# Patient Record
Sex: MALE | Race: WHITE | NOT HISPANIC OR LATINO | Employment: OTHER | RURAL
[De-identification: names, ages, dates, MRNs, and addresses within clinical notes are randomized per-mention and may not be internally consistent; named-entity substitution may affect disease eponyms.]

---

## 2017-01-05 ENCOUNTER — HISTORICAL (OUTPATIENT)
Dept: ADMINISTRATIVE | Facility: HOSPITAL | Age: 62
End: 2017-01-05

## 2017-01-10 LAB
LAB AP CLINICAL INFORMATION: NORMAL
LAB AP COMMENTS: NORMAL
LAB AP DIAGNOSIS - HISTORICAL: NORMAL
LAB AP GROSS PATHOLOGY - HISTORICAL: NORMAL
LAB AP SPECIMEN SUBMITTED - HISTORICAL: NORMAL

## 2017-08-22 ENCOUNTER — HISTORICAL (OUTPATIENT)
Dept: ADMINISTRATIVE | Facility: HOSPITAL | Age: 62
End: 2017-08-22

## 2017-08-24 LAB
LAB AP CLINICAL INFORMATION: NORMAL
LAB AP DIAGNOSIS - HISTORICAL: NORMAL
LAB AP GROSS PATHOLOGY - HISTORICAL: NORMAL
LAB AP SPECIMEN SUBMITTED - HISTORICAL: NORMAL

## 2017-09-26 ENCOUNTER — HISTORICAL (OUTPATIENT)
Dept: ADMINISTRATIVE | Facility: HOSPITAL | Age: 62
End: 2017-09-26

## 2017-11-28 ENCOUNTER — HISTORICAL (OUTPATIENT)
Dept: ADMINISTRATIVE | Facility: HOSPITAL | Age: 62
End: 2017-11-28

## 2018-06-20 ENCOUNTER — HISTORICAL (OUTPATIENT)
Dept: ADMINISTRATIVE | Facility: HOSPITAL | Age: 63
End: 2018-06-20

## 2018-07-23 ENCOUNTER — HISTORICAL (OUTPATIENT)
Dept: ADMINISTRATIVE | Facility: HOSPITAL | Age: 63
End: 2018-07-23

## 2018-09-19 ENCOUNTER — HISTORICAL (OUTPATIENT)
Dept: ADMINISTRATIVE | Facility: HOSPITAL | Age: 63
End: 2018-09-19

## 2018-11-14 ENCOUNTER — HISTORICAL (OUTPATIENT)
Dept: ADMINISTRATIVE | Facility: HOSPITAL | Age: 63
End: 2018-11-14

## 2018-11-20 ENCOUNTER — HISTORICAL (OUTPATIENT)
Dept: ADMINISTRATIVE | Facility: HOSPITAL | Age: 63
End: 2018-11-20

## 2019-01-09 ENCOUNTER — HISTORICAL (OUTPATIENT)
Dept: ADMINISTRATIVE | Facility: HOSPITAL | Age: 64
End: 2019-01-09

## 2019-04-10 ENCOUNTER — HISTORICAL (OUTPATIENT)
Dept: ADMINISTRATIVE | Facility: HOSPITAL | Age: 64
End: 2019-04-10

## 2019-07-10 ENCOUNTER — HISTORICAL (OUTPATIENT)
Dept: ADMINISTRATIVE | Facility: HOSPITAL | Age: 64
End: 2019-07-10

## 2019-07-12 ENCOUNTER — HISTORICAL (OUTPATIENT)
Dept: ADMINISTRATIVE | Facility: HOSPITAL | Age: 64
End: 2019-07-12

## 2019-07-25 ENCOUNTER — HISTORICAL (OUTPATIENT)
Dept: ADMINISTRATIVE | Facility: HOSPITAL | Age: 64
End: 2019-07-25

## 2019-08-08 ENCOUNTER — HISTORICAL (OUTPATIENT)
Dept: ADMINISTRATIVE | Facility: HOSPITAL | Age: 64
End: 2019-08-08

## 2019-10-02 ENCOUNTER — HISTORICAL (OUTPATIENT)
Dept: ADMINISTRATIVE | Facility: HOSPITAL | Age: 64
End: 2019-10-02

## 2019-11-11 ENCOUNTER — HISTORICAL (OUTPATIENT)
Dept: ADMINISTRATIVE | Facility: HOSPITAL | Age: 64
End: 2019-11-11

## 2020-01-08 ENCOUNTER — HISTORICAL (OUTPATIENT)
Dept: ADMINISTRATIVE | Facility: HOSPITAL | Age: 65
End: 2020-01-08

## 2020-05-26 ENCOUNTER — HISTORICAL (OUTPATIENT)
Dept: ADMINISTRATIVE | Facility: HOSPITAL | Age: 65
End: 2020-05-26

## 2020-05-28 LAB

## 2020-06-12 ENCOUNTER — HISTORICAL (OUTPATIENT)
Dept: ADMINISTRATIVE | Facility: HOSPITAL | Age: 65
End: 2020-06-12

## 2020-06-16 LAB
INSULIN SERPL-ACNC: NORMAL U[IU]/ML
INSULIN SERPL-ACNC: PRESENT U[IU]/ML
LAB AP CLINICAL INFORMATION: NORMAL
LAB AP DIAGNOSIS - HISTORICAL: NORMAL
LAB AP GROSS PATHOLOGY - HISTORICAL: NORMAL
LAB AP SPECIMEN SUBMITTED - HISTORICAL: NORMAL

## 2020-09-11 ENCOUNTER — HISTORICAL (OUTPATIENT)
Dept: ADMINISTRATIVE | Facility: HOSPITAL | Age: 65
End: 2020-09-11

## 2020-11-12 ENCOUNTER — HISTORICAL (OUTPATIENT)
Dept: ADMINISTRATIVE | Facility: HOSPITAL | Age: 65
End: 2020-11-12

## 2020-11-16 LAB

## 2021-03-26 VITALS
WEIGHT: 244 LBS | OXYGEN SATURATION: 98 % | SYSTOLIC BLOOD PRESSURE: 138 MMHG | DIASTOLIC BLOOD PRESSURE: 90 MMHG | HEART RATE: 84 BPM

## 2021-03-30 ENCOUNTER — OFFICE VISIT (OUTPATIENT)
Dept: CARDIOLOGY | Facility: CLINIC | Age: 66
End: 2021-03-30
Payer: MEDICARE

## 2021-03-30 VITALS
OXYGEN SATURATION: 97 % | BODY MASS INDEX: 32.91 KG/M2 | HEART RATE: 90 BPM | HEIGHT: 72 IN | SYSTOLIC BLOOD PRESSURE: 140 MMHG | DIASTOLIC BLOOD PRESSURE: 82 MMHG | WEIGHT: 243 LBS

## 2021-03-30 DIAGNOSIS — I25.10 CORONARY ARTERY DISEASE INVOLVING NATIVE CORONARY ARTERY OF NATIVE HEART WITHOUT ANGINA PECTORIS: ICD-10-CM

## 2021-03-30 DIAGNOSIS — I48.0 PAROXYSMAL ATRIAL FIBRILLATION: ICD-10-CM

## 2021-03-30 DIAGNOSIS — E78.00 PURE HYPERCHOLESTEROLEMIA: ICD-10-CM

## 2021-03-30 DIAGNOSIS — G47.33 OBSTRUCTIVE SLEEP APNEA SYNDROME: ICD-10-CM

## 2021-03-30 DIAGNOSIS — I35.0 NONRHEUMATIC AORTIC VALVE STENOSIS: Primary | ICD-10-CM

## 2021-03-30 DIAGNOSIS — I10 ESSENTIAL HYPERTENSION: ICD-10-CM

## 2021-03-30 DIAGNOSIS — Z95.2 HX OF AORTIC VALVE REPLACEMENT: ICD-10-CM

## 2021-03-30 DIAGNOSIS — I48.91 ATRIAL FIBRILLATION, UNSPECIFIED TYPE: ICD-10-CM

## 2021-03-30 PROCEDURE — 93005 ELECTROCARDIOGRAM TRACING: CPT | Mod: PBBFAC | Performed by: INTERNAL MEDICINE

## 2021-03-30 PROCEDURE — 93010 EKG 12-LEAD: ICD-10-PCS | Mod: S$PBB,,, | Performed by: INTERNAL MEDICINE

## 2021-03-30 PROCEDURE — 99213 OFFICE O/P EST LOW 20 MIN: CPT | Mod: PBBFAC,25 | Performed by: NURSE PRACTITIONER

## 2021-03-30 PROCEDURE — 93010 ELECTROCARDIOGRAM REPORT: CPT | Mod: S$PBB,,, | Performed by: INTERNAL MEDICINE

## 2021-03-30 PROCEDURE — 99212 PR OFFICE/OUTPT VISIT, EST, LEVL II, 10-19 MIN: ICD-10-PCS | Mod: S$PBB,,, | Performed by: NURSE PRACTITIONER

## 2021-03-30 PROCEDURE — 99999 PR PBB SHADOW E&M-EST. PATIENT-LVL III: CPT | Mod: PBBFAC,,, | Performed by: NURSE PRACTITIONER

## 2021-03-30 PROCEDURE — 99999 PR PBB SHADOW E&M-EST. PATIENT-LVL III: ICD-10-PCS | Mod: PBBFAC,,, | Performed by: NURSE PRACTITIONER

## 2021-03-30 PROCEDURE — 99212 OFFICE O/P EST SF 10 MIN: CPT | Mod: S$PBB,,, | Performed by: NURSE PRACTITIONER

## 2021-03-30 RX ORDER — AMIODARONE HYDROCHLORIDE 200 MG/1
200 TABLET ORAL DAILY
COMMUNITY
End: 2021-11-16

## 2021-03-30 RX ORDER — LISINOPRIL 10 MG/1
10 TABLET ORAL 2 TIMES DAILY
COMMUNITY
End: 2022-01-19 | Stop reason: SDUPTHER

## 2021-03-30 RX ORDER — ATORVASTATIN CALCIUM 80 MG/1
80 TABLET, FILM COATED ORAL DAILY
COMMUNITY
End: 2021-11-16

## 2021-03-30 RX ORDER — AMLODIPINE BESYLATE 10 MG/1
10 TABLET ORAL DAILY
COMMUNITY
End: 2021-11-16 | Stop reason: SDUPTHER

## 2021-03-30 RX ORDER — LEVOTHYROXINE SODIUM 75 UG/1
75 TABLET ORAL
COMMUNITY
End: 2022-01-20 | Stop reason: SDUPTHER

## 2021-04-06 ENCOUNTER — HOSPITAL ENCOUNTER (OUTPATIENT)
Dept: CARDIOLOGY | Facility: HOSPITAL | Age: 66
Discharge: HOME | End: 2021-04-06
Attending: NURSE PRACTITIONER
Payer: MEDICARE

## 2021-04-06 DIAGNOSIS — I35.0 NONRHEUMATIC AORTIC VALVE STENOSIS: ICD-10-CM

## 2021-04-06 DIAGNOSIS — Z95.2 HX OF AORTIC VALVE REPLACEMENT: ICD-10-CM

## 2021-04-06 PROCEDURE — 93306 TTE W/DOPPLER COMPLETE: CPT | Mod: 26,,, | Performed by: INTERNAL MEDICINE

## 2021-04-06 PROCEDURE — 93306 TTE W/DOPPLER COMPLETE: CPT

## 2021-04-06 PROCEDURE — 93306 ECHO (CUPID ONLY): ICD-10-PCS | Mod: 26,,, | Performed by: INTERNAL MEDICINE

## 2021-04-12 RX ORDER — HYDROCODONE BITARTRATE AND ACETAMINOPHEN 10; 325 MG/1; MG/1
1 TABLET ORAL EVERY 6 HOURS PRN
Qty: 120 TABLET | Refills: 0 | Status: SHIPPED | OUTPATIENT
Start: 2021-04-12 | End: 2021-08-19 | Stop reason: SDUPTHER

## 2021-04-15 LAB
AV INDEX (PROSTH): 0.48
AV MEAN GRADIENT: 18 MMHG
AV VALVE AREA: 1.52 CM2
CV ECHO LV RWT: 0.42 CM
DOP CALC AO VTI: 59.52 CM
DOP CALC LVOT AREA: 3.1 CM2
DOP CALC LVOT DIAMETER: 2 CM
DOP CALC LVOT STROKE VOLUME: 90.18 CM3
DOP CALCLVOT PEAK VEL VTI: 28.72 CM
ECHO LV POSTERIOR WALL: 1 CM (ref 0.6–1.1)
EJECTION FRACTION: 55 %
FRACTIONAL SHORTENING: 46 % (ref 28–44)
INTERVENTRICULAR SEPTUM: 1.1 CM (ref 0.6–1.1)
LEFT ATRIUM SIZE: 5.1 CM
LEFT INTERNAL DIMENSION IN SYSTOLE: 2.6 CM (ref 2.1–4)
LEFT VENTRICULAR INTERNAL DIMENSION IN DIASTOLE: 4.8 CM (ref 3.5–6)
LEFT VENTRICULAR MASS: 181.91 G
PISA TR MAX VEL: 2.7 M/S
RIGHT VENTRICULAR END-DIASTOLIC DIMENSION: 2.9 CM
TR MAX PG: 29 MMHG

## 2021-06-16 ENCOUNTER — LAB REQUISITION (OUTPATIENT)
Dept: LAB | Facility: HOSPITAL | Age: 66
End: 2021-06-16
Payer: MEDICARE

## 2021-06-16 DIAGNOSIS — D49.2 NEOPLASM OF UNSPECIFIED BEHAVIOR OF BONE, SOFT TISSUE, AND SKIN: ICD-10-CM

## 2021-06-16 PROCEDURE — 88305 PATHOLOGY, DERMATOLOGY: ICD-10-PCS | Mod: 26,,, | Performed by: PATHOLOGY

## 2021-06-16 PROCEDURE — 88305 TISSUE EXAM BY PATHOLOGIST: CPT | Mod: 26,,, | Performed by: PATHOLOGY

## 2021-06-16 PROCEDURE — 88305 TISSUE EXAM BY PATHOLOGIST: CPT | Mod: SUR | Performed by: DERMATOLOGY

## 2021-06-17 LAB
ESTROGEN SERPL-MCNC: NORMAL PG/ML
LAB AP GROSS DESCRIPTION: NORMAL
LAB AP LABORATORY NOTES: NORMAL
LAB AP SPEC A DDX: NORMAL
LAB AP SPEC A MORPHOLOGY: NORMAL
LAB AP SPEC A PROCEDURE: NORMAL
T3RU NFR SERPL: NORMAL %

## 2021-08-03 PROCEDURE — 88305 PATHOLOGY, DERMATOLOGY: ICD-10-PCS | Mod: 26,,, | Performed by: PATHOLOGY

## 2021-08-03 PROCEDURE — 88305 TISSUE EXAM BY PATHOLOGIST: CPT | Mod: 26,,, | Performed by: PATHOLOGY

## 2021-08-03 PROCEDURE — 88305 TISSUE EXAM BY PATHOLOGIST: CPT | Mod: SUR | Performed by: DERMATOLOGY

## 2021-08-04 ENCOUNTER — LAB REQUISITION (OUTPATIENT)
Dept: LAB | Facility: HOSPITAL | Age: 66
End: 2021-08-04
Payer: MEDICARE

## 2021-08-04 DIAGNOSIS — D49.2 NEOPLASM OF UNSPECIFIED BEHAVIOR OF BONE, SOFT TISSUE, AND SKIN: ICD-10-CM

## 2021-08-19 ENCOUNTER — OFFICE VISIT (OUTPATIENT)
Dept: PRIMARY CARE CLINIC | Facility: CLINIC | Age: 66
End: 2021-08-19
Payer: MEDICARE

## 2021-08-19 VITALS
SYSTOLIC BLOOD PRESSURE: 134 MMHG | WEIGHT: 249 LBS | RESPIRATION RATE: 18 BRPM | DIASTOLIC BLOOD PRESSURE: 90 MMHG | HEART RATE: 64 BPM | HEIGHT: 72 IN | OXYGEN SATURATION: 97 % | BODY MASS INDEX: 33.72 KG/M2

## 2021-08-19 DIAGNOSIS — M54.50 CHRONIC LOW BACK PAIN, UNSPECIFIED BACK PAIN LATERALITY, UNSPECIFIED WHETHER SCIATICA PRESENT: ICD-10-CM

## 2021-08-19 DIAGNOSIS — I25.10 CORONARY ARTERY DISEASE INVOLVING NATIVE CORONARY ARTERY OF NATIVE HEART WITHOUT ANGINA PECTORIS: ICD-10-CM

## 2021-08-19 DIAGNOSIS — I48.0 PAROXYSMAL ATRIAL FIBRILLATION: ICD-10-CM

## 2021-08-19 DIAGNOSIS — G47.33 OBSTRUCTIVE SLEEP APNEA SYNDROME: Primary | ICD-10-CM

## 2021-08-19 DIAGNOSIS — G89.29 CHRONIC LOW BACK PAIN, UNSPECIFIED BACK PAIN LATERALITY, UNSPECIFIED WHETHER SCIATICA PRESENT: ICD-10-CM

## 2021-08-19 PROCEDURE — 88305 TISSUE EXAM BY PATHOLOGIST: CPT | Mod: 26,,, | Performed by: PATHOLOGY

## 2021-08-19 PROCEDURE — 99214 PR OFFICE/OUTPT VISIT, EST, LEVL IV, 30-39 MIN: ICD-10-PCS | Mod: ,,, | Performed by: FAMILY MEDICINE

## 2021-08-19 PROCEDURE — 88305 TISSUE EXAM BY PATHOLOGIST: CPT | Mod: SUR | Performed by: DERMATOLOGY

## 2021-08-19 PROCEDURE — 99214 OFFICE O/P EST MOD 30 MIN: CPT | Mod: ,,, | Performed by: FAMILY MEDICINE

## 2021-08-19 PROCEDURE — 88305 PATHOLOGY, DERMATOLOGY: ICD-10-PCS | Mod: 26,,, | Performed by: PATHOLOGY

## 2021-08-19 RX ORDER — HYDROCODONE BITARTRATE AND ACETAMINOPHEN 10; 325 MG/1; MG/1
1 TABLET ORAL EVERY 6 HOURS PRN
Qty: 120 TABLET | Refills: 0 | Status: SHIPPED | OUTPATIENT
Start: 2021-08-19 | End: 2021-10-26 | Stop reason: SDUPTHER

## 2021-08-20 ENCOUNTER — LAB REQUISITION (OUTPATIENT)
Dept: LAB | Facility: HOSPITAL | Age: 66
End: 2021-08-20
Attending: DERMATOLOGY
Payer: MEDICARE

## 2021-08-20 DIAGNOSIS — C44.229 SQUAMOUS CELL CARCINOMA OF SKIN OF LEFT EAR AND EXTERNAL AURICULAR CANAL: ICD-10-CM

## 2021-08-23 PROBLEM — M54.50 LOW BACK PAIN: Status: ACTIVE | Noted: 2021-08-23

## 2021-09-09 DIAGNOSIS — M54.9 DORSALGIA, UNSPECIFIED: ICD-10-CM

## 2021-09-13 ENCOUNTER — OFFICE VISIT (OUTPATIENT)
Dept: SPINE | Facility: CLINIC | Age: 66
End: 2021-09-13
Payer: MEDICARE

## 2021-09-13 ENCOUNTER — HOSPITAL ENCOUNTER (OUTPATIENT)
Dept: RADIOLOGY | Facility: HOSPITAL | Age: 66
Discharge: HOME | End: 2021-09-13
Attending: ORTHOPAEDIC SURGERY
Payer: MEDICARE

## 2021-09-13 VITALS — WEIGHT: 245 LBS | HEIGHT: 72 IN | BODY MASS INDEX: 33.18 KG/M2

## 2021-09-13 DIAGNOSIS — M51.36 DDD (DEGENERATIVE DISC DISEASE), LUMBAR: ICD-10-CM

## 2021-09-13 DIAGNOSIS — M54.50 CHRONIC LOW BACK PAIN, UNSPECIFIED BACK PAIN LATERALITY, UNSPECIFIED WHETHER SCIATICA PRESENT: ICD-10-CM

## 2021-09-13 DIAGNOSIS — G89.29 CHRONIC LOW BACK PAIN, UNSPECIFIED BACK PAIN LATERALITY, UNSPECIFIED WHETHER SCIATICA PRESENT: ICD-10-CM

## 2021-09-13 DIAGNOSIS — M54.16 LUMBAR RADICULOPATHY: ICD-10-CM

## 2021-09-13 DIAGNOSIS — M43.16 SPONDYLOLISTHESIS, LUMBAR REGION: Primary | ICD-10-CM

## 2021-09-13 DIAGNOSIS — M54.9 DORSALGIA, UNSPECIFIED: ICD-10-CM

## 2021-09-13 PROCEDURE — 72110 X-RAY EXAM L-2 SPINE 4/>VWS: CPT | Mod: 26,,, | Performed by: ORTHOPAEDIC SURGERY

## 2021-09-13 PROCEDURE — 72110 X-RAY EXAM L-2 SPINE 4/>VWS: CPT | Mod: TC

## 2021-09-13 PROCEDURE — 99214 OFFICE O/P EST MOD 30 MIN: CPT | Mod: PBBFAC | Performed by: ORTHOPAEDIC SURGERY

## 2021-09-13 PROCEDURE — 72110 XR LUMBAR SPINE 5 VIEW WITH FLEX AND EXT: ICD-10-PCS | Mod: 26,,, | Performed by: ORTHOPAEDIC SURGERY

## 2021-09-13 PROCEDURE — 99204 PR OFFICE/OUTPT VISIT, NEW, LEVL IV, 45-59 MIN: ICD-10-PCS | Mod: S$PBB,,, | Performed by: ORTHOPAEDIC SURGERY

## 2021-09-13 PROCEDURE — 99204 OFFICE O/P NEW MOD 45 MIN: CPT | Mod: S$PBB,,, | Performed by: ORTHOPAEDIC SURGERY

## 2021-09-15 ENCOUNTER — HOSPITAL ENCOUNTER (OUTPATIENT)
Dept: RADIOLOGY | Facility: HOSPITAL | Age: 66
Discharge: HOME | End: 2021-09-15
Attending: ORTHOPAEDIC SURGERY
Payer: MEDICARE

## 2021-09-15 ENCOUNTER — OFFICE VISIT (OUTPATIENT)
Dept: SPINE | Facility: CLINIC | Age: 66
End: 2021-09-15
Payer: MEDICARE

## 2021-09-15 DIAGNOSIS — M43.16 SPONDYLOLISTHESIS, LUMBAR REGION: Primary | ICD-10-CM

## 2021-09-15 DIAGNOSIS — M43.16 SPONDYLOLISTHESIS, LUMBAR REGION: ICD-10-CM

## 2021-09-15 DIAGNOSIS — M51.36 DDD (DEGENERATIVE DISC DISEASE), LUMBAR: ICD-10-CM

## 2021-09-15 DIAGNOSIS — M54.16 LUMBAR RADICULOPATHY: ICD-10-CM

## 2021-09-15 PROCEDURE — 99214 OFFICE O/P EST MOD 30 MIN: CPT | Mod: S$PBB,,, | Performed by: ORTHOPAEDIC SURGERY

## 2021-09-15 PROCEDURE — 76380 CAT SCAN FOLLOW-UP STUDY: CPT | Mod: TC

## 2021-09-15 PROCEDURE — 99214 PR OFFICE/OUTPT VISIT, EST, LEVL IV, 30-39 MIN: ICD-10-PCS | Mod: S$PBB,,, | Performed by: ORTHOPAEDIC SURGERY

## 2021-09-15 PROCEDURE — 99213 OFFICE O/P EST LOW 20 MIN: CPT | Mod: PBBFAC | Performed by: ORTHOPAEDIC SURGERY

## 2021-09-15 PROCEDURE — 76380 CAT SCAN FOLLOW-UP STUDY: CPT | Mod: 26,,, | Performed by: STUDENT IN AN ORGANIZED HEALTH CARE EDUCATION/TRAINING PROGRAM

## 2021-09-15 PROCEDURE — 76380 CT LIMITED: ICD-10-PCS | Mod: 26,,, | Performed by: STUDENT IN AN ORGANIZED HEALTH CARE EDUCATION/TRAINING PROGRAM

## 2021-09-23 RX ORDER — ESCITALOPRAM OXALATE 10 MG/1
TABLET ORAL
Qty: 90 TABLET | Refills: 3 | Status: SHIPPED | OUTPATIENT
Start: 2021-09-23 | End: 2022-07-08 | Stop reason: SDUPTHER

## 2021-10-04 ENCOUNTER — OFFICE VISIT (OUTPATIENT)
Dept: CARDIOLOGY | Facility: CLINIC | Age: 66
End: 2021-10-04
Payer: MEDICARE

## 2021-10-04 VITALS
HEIGHT: 72 IN | BODY MASS INDEX: 33.59 KG/M2 | WEIGHT: 248 LBS | SYSTOLIC BLOOD PRESSURE: 140 MMHG | DIASTOLIC BLOOD PRESSURE: 82 MMHG | HEART RATE: 88 BPM | OXYGEN SATURATION: 97 %

## 2021-10-04 DIAGNOSIS — I48.91 ATRIAL FIBRILLATION, UNSPECIFIED TYPE: Primary | ICD-10-CM

## 2021-10-04 PROCEDURE — 93005 ELECTROCARDIOGRAM TRACING: CPT | Mod: PBBFAC | Performed by: INTERNAL MEDICINE

## 2021-10-04 PROCEDURE — 99214 OFFICE O/P EST MOD 30 MIN: CPT | Mod: S$PBB,,, | Performed by: NURSE PRACTITIONER

## 2021-10-04 PROCEDURE — 99214 OFFICE O/P EST MOD 30 MIN: CPT | Mod: PBBFAC | Performed by: NURSE PRACTITIONER

## 2021-10-04 PROCEDURE — 99214 PR OFFICE/OUTPT VISIT, EST, LEVL IV, 30-39 MIN: ICD-10-PCS | Mod: S$PBB,,, | Performed by: NURSE PRACTITIONER

## 2021-10-04 PROCEDURE — 93010 ELECTROCARDIOGRAM REPORT: CPT | Mod: S$PBB,,, | Performed by: INTERNAL MEDICINE

## 2021-10-04 PROCEDURE — 93010 EKG 12-LEAD: ICD-10-PCS | Mod: S$PBB,,, | Performed by: INTERNAL MEDICINE

## 2021-10-18 DIAGNOSIS — Z12.5 PROSTATE CANCER SCREENING: ICD-10-CM

## 2021-10-18 DIAGNOSIS — R73.9 HYPERGLYCEMIA: ICD-10-CM

## 2021-10-18 DIAGNOSIS — E53.9 VITAMIN B DEFICIENCY, UNSPECIFIED: ICD-10-CM

## 2021-10-18 DIAGNOSIS — E78.5 HYPERLIPIDEMIA, UNSPECIFIED HYPERLIPIDEMIA TYPE: ICD-10-CM

## 2021-10-18 DIAGNOSIS — I10 HTN (HYPERTENSION), BENIGN: ICD-10-CM

## 2021-10-18 DIAGNOSIS — E55.9 VITAMIN D DEFICIENCY, UNSPECIFIED: ICD-10-CM

## 2021-10-18 DIAGNOSIS — Z11.59 NEED FOR HEPATITIS C SCREENING TEST: Primary | ICD-10-CM

## 2021-10-19 ENCOUNTER — LAB VISIT (OUTPATIENT)
Dept: LAB | Facility: CLINIC | Age: 66
End: 2021-10-19
Payer: MEDICARE

## 2021-10-19 DIAGNOSIS — Z11.59 NEED FOR HEPATITIS C SCREENING TEST: ICD-10-CM

## 2021-10-19 DIAGNOSIS — E55.9 VITAMIN D DEFICIENCY, UNSPECIFIED: ICD-10-CM

## 2021-10-19 DIAGNOSIS — R73.9 HYPERGLYCEMIA: ICD-10-CM

## 2021-10-19 DIAGNOSIS — Z12.5 PROSTATE CANCER SCREENING: ICD-10-CM

## 2021-10-19 DIAGNOSIS — E78.5 HYPERLIPIDEMIA, UNSPECIFIED HYPERLIPIDEMIA TYPE: ICD-10-CM

## 2021-10-19 DIAGNOSIS — E83.52 HYPERCALCEMIA: Primary | ICD-10-CM

## 2021-10-19 DIAGNOSIS — I10 HTN (HYPERTENSION), BENIGN: ICD-10-CM

## 2021-10-19 LAB
25(OH)D3 SERPL-MCNC: 29.7 NG/ML
ALBUMIN SERPL BCP-MCNC: 4.2 G/DL (ref 3.5–5)
ALBUMIN/GLOB SERPL: 1.3 {RATIO}
ALP SERPL-CCNC: 88 U/L (ref 45–115)
ALT SERPL W P-5'-P-CCNC: 48 U/L (ref 16–61)
ANION GAP SERPL CALCULATED.3IONS-SCNC: 5 MMOL/L (ref 7–16)
AST SERPL W P-5'-P-CCNC: 28 U/L (ref 15–37)
BASOPHILS # BLD AUTO: 0.03 K/UL (ref 0–0.2)
BASOPHILS NFR BLD AUTO: 0.5 % (ref 0–1)
BILIRUB SERPL-MCNC: 1.7 MG/DL (ref 0–1.2)
BUN SERPL-MCNC: 21 MG/DL (ref 7–18)
BUN/CREAT SERPL: 21 (ref 6–20)
CALCIUM SERPL-MCNC: 10.5 MG/DL (ref 8.5–10.1)
CHLORIDE SERPL-SCNC: 108 MMOL/L (ref 98–107)
CHOLEST SERPL-MCNC: 154 MG/DL (ref 0–200)
CHOLEST/HDLC SERPL: 2.9 {RATIO}
CO2 SERPL-SCNC: 32 MMOL/L (ref 21–32)
CREAT SERPL-MCNC: 1.01 MG/DL (ref 0.7–1.3)
DIFFERENTIAL METHOD BLD: ABNORMAL
EOSINOPHIL # BLD AUTO: 0.1 K/UL (ref 0–0.5)
EOSINOPHIL NFR BLD AUTO: 1.7 % (ref 1–4)
ERYTHROCYTE [DISTWIDTH] IN BLOOD BY AUTOMATED COUNT: 12.9 % (ref 11.5–14.5)
EST. AVERAGE GLUCOSE BLD GHB EST-MCNC: 81 MG/DL
GLOBULIN SER-MCNC: 3.3 G/DL (ref 2–4)
GLUCOSE SERPL-MCNC: 94 MG/DL (ref 74–106)
HBA1C MFR BLD HPLC: 5 % (ref 4.5–6.6)
HCT VFR BLD AUTO: 48.3 % (ref 40–54)
HCV AB SER QL: NORMAL
HDLC SERPL-MCNC: 53 MG/DL (ref 40–60)
HGB BLD-MCNC: 16.5 G/DL (ref 13.5–18)
IMM GRANULOCYTES # BLD AUTO: 0.01 K/UL (ref 0–0.04)
IMM GRANULOCYTES NFR BLD: 0.2 % (ref 0–0.4)
LDLC SERPL CALC-MCNC: 80 MG/DL
LDLC/HDLC SERPL: 1.5 {RATIO}
LYMPHOCYTES # BLD AUTO: 2.2 K/UL (ref 1–4.8)
LYMPHOCYTES NFR BLD AUTO: 37.6 % (ref 27–41)
MCH RBC QN AUTO: 32.4 PG (ref 27–31)
MCHC RBC AUTO-ENTMCNC: 34.2 G/DL (ref 32–36)
MCV RBC AUTO: 94.9 FL (ref 80–96)
MONOCYTES # BLD AUTO: 0.5 K/UL (ref 0–0.8)
MONOCYTES NFR BLD AUTO: 8.5 % (ref 2–6)
MPC BLD CALC-MCNC: 10 FL (ref 9.4–12.4)
NEUTROPHILS # BLD AUTO: 3.01 K/UL (ref 1.8–7.7)
NEUTROPHILS NFR BLD AUTO: 51.5 % (ref 53–65)
NONHDLC SERPL-MCNC: 101 MG/DL
NRBC # BLD AUTO: 0 X10E3/UL
NRBC, AUTO (.00): 0 %
PLATELET # BLD AUTO: 191 K/UL (ref 150–400)
POTASSIUM SERPL-SCNC: 5.1 MMOL/L (ref 3.5–5.1)
PROT SERPL-MCNC: 7.5 G/DL (ref 6.4–8.2)
PSA SERPL-MCNC: 1.06 NG/ML (ref 0–4.1)
PTH-INTACT SERPL-MCNC: 103.5 PG/ML (ref 18.4–80.1)
RBC # BLD AUTO: 5.09 M/UL (ref 4.6–6.2)
SODIUM SERPL-SCNC: 140 MMOL/L (ref 136–145)
TRIGL SERPL-MCNC: 104 MG/DL (ref 35–150)
TSH SERPL DL<=0.005 MIU/L-ACNC: 4.35 UIU/ML (ref 0.36–3.74)
VLDLC SERPL-MCNC: 21 MG/DL
WBC # BLD AUTO: 5.85 K/UL (ref 4.5–11)

## 2021-10-19 PROCEDURE — 85025 CBC WITH DIFFERENTIAL: ICD-10-PCS | Mod: ,,, | Performed by: CLINICAL MEDICAL LABORATORY

## 2021-10-19 PROCEDURE — 82306 VITAMIN D 25 HYDROXY: CPT | Mod: ,,, | Performed by: CLINICAL MEDICAL LABORATORY

## 2021-10-19 PROCEDURE — 80053 COMPREHENSIVE METABOLIC PANEL: ICD-10-PCS | Mod: ,,, | Performed by: CLINICAL MEDICAL LABORATORY

## 2021-10-19 PROCEDURE — 82306 VITAMIN D: ICD-10-PCS | Mod: ,,, | Performed by: CLINICAL MEDICAL LABORATORY

## 2021-10-19 PROCEDURE — 84443 TSH: ICD-10-PCS | Mod: ,,, | Performed by: CLINICAL MEDICAL LABORATORY

## 2021-10-19 PROCEDURE — G0103 PSA, SCREENING: ICD-10-PCS | Mod: ,,, | Performed by: CLINICAL MEDICAL LABORATORY

## 2021-10-19 PROCEDURE — 84443 ASSAY THYROID STIM HORMONE: CPT | Mod: ,,, | Performed by: CLINICAL MEDICAL LABORATORY

## 2021-10-19 PROCEDURE — 80061 LIPID PANEL: CPT | Mod: ,,, | Performed by: CLINICAL MEDICAL LABORATORY

## 2021-10-19 PROCEDURE — 83036 HEMOGLOBIN GLYCOSYLATED A1C: CPT | Mod: ,,, | Performed by: CLINICAL MEDICAL LABORATORY

## 2021-10-19 PROCEDURE — 83970 ASSAY OF PARATHORMONE: CPT | Mod: ,,, | Performed by: CLINICAL MEDICAL LABORATORY

## 2021-10-19 PROCEDURE — 80061 LIPID PANEL: ICD-10-PCS | Mod: ,,, | Performed by: CLINICAL MEDICAL LABORATORY

## 2021-10-19 PROCEDURE — 86803 HEPATITIS C AB TEST: CPT | Mod: ,,, | Performed by: CLINICAL MEDICAL LABORATORY

## 2021-10-19 PROCEDURE — 36415 PR COLLECTION VENOUS BLOOD,VENIPUNCTURE: ICD-10-PCS | Mod: ,,, | Performed by: CLINICAL MEDICAL LABORATORY

## 2021-10-19 PROCEDURE — 36415 COLL VENOUS BLD VENIPUNCTURE: CPT | Mod: ,,, | Performed by: CLINICAL MEDICAL LABORATORY

## 2021-10-19 PROCEDURE — 83036 HEMOGLOBIN A1C: ICD-10-PCS | Mod: ,,, | Performed by: CLINICAL MEDICAL LABORATORY

## 2021-10-19 PROCEDURE — G0103 PSA SCREENING: HCPCS | Mod: ,,, | Performed by: CLINICAL MEDICAL LABORATORY

## 2021-10-19 PROCEDURE — 85025 COMPLETE CBC W/AUTO DIFF WBC: CPT | Mod: ,,, | Performed by: CLINICAL MEDICAL LABORATORY

## 2021-10-19 PROCEDURE — 80053 COMPREHEN METABOLIC PANEL: CPT | Mod: ,,, | Performed by: CLINICAL MEDICAL LABORATORY

## 2021-10-19 PROCEDURE — 83970 PTH, INTACT: ICD-10-PCS | Mod: ,,, | Performed by: CLINICAL MEDICAL LABORATORY

## 2021-10-19 PROCEDURE — 86803 HEPATITIS C ANTIBODY: ICD-10-PCS | Mod: ,,, | Performed by: CLINICAL MEDICAL LABORATORY

## 2021-10-22 ENCOUNTER — HOSPITAL ENCOUNTER (OUTPATIENT)
Dept: RADIOLOGY | Facility: HOSPITAL | Age: 66
Discharge: HOME | End: 2021-10-22
Attending: FAMILY MEDICINE
Payer: MEDICARE

## 2021-10-22 DIAGNOSIS — E83.52 HYPERCALCEMIA: ICD-10-CM

## 2021-10-22 DIAGNOSIS — E55.9 VITAMIN D DEFICIENCY, UNSPECIFIED: ICD-10-CM

## 2021-10-22 PROCEDURE — 76536 US EXAM OF HEAD AND NECK: CPT | Mod: TC

## 2021-10-22 PROCEDURE — 76536 US THYROID: ICD-10-PCS | Mod: 26,,, | Performed by: RADIOLOGY

## 2021-10-22 PROCEDURE — 76536 US EXAM OF HEAD AND NECK: CPT | Mod: 26,,, | Performed by: RADIOLOGY

## 2021-10-26 ENCOUNTER — OFFICE VISIT (OUTPATIENT)
Dept: PRIMARY CARE CLINIC | Facility: CLINIC | Age: 66
End: 2021-10-26
Payer: MEDICARE

## 2021-10-26 VITALS
OXYGEN SATURATION: 98 % | RESPIRATION RATE: 18 BRPM | WEIGHT: 251 LBS | DIASTOLIC BLOOD PRESSURE: 86 MMHG | BODY MASS INDEX: 34 KG/M2 | HEART RATE: 80 BPM | SYSTOLIC BLOOD PRESSURE: 140 MMHG | HEIGHT: 72 IN

## 2021-10-26 DIAGNOSIS — M54.50 CHRONIC LOW BACK PAIN, UNSPECIFIED BACK PAIN LATERALITY, UNSPECIFIED WHETHER SCIATICA PRESENT: ICD-10-CM

## 2021-10-26 DIAGNOSIS — I25.10 CORONARY ARTERY DISEASE INVOLVING NATIVE CORONARY ARTERY OF NATIVE HEART WITHOUT ANGINA PECTORIS: ICD-10-CM

## 2021-10-26 DIAGNOSIS — G89.29 CHRONIC LOW BACK PAIN, UNSPECIFIED BACK PAIN LATERALITY, UNSPECIFIED WHETHER SCIATICA PRESENT: ICD-10-CM

## 2021-10-26 DIAGNOSIS — E83.52 HYPERCALCEMIA: Primary | ICD-10-CM

## 2021-10-26 DIAGNOSIS — E78.00 PURE HYPERCHOLESTEROLEMIA: ICD-10-CM

## 2021-10-26 DIAGNOSIS — Z95.2 HX OF AORTIC VALVE REPLACEMENT: ICD-10-CM

## 2021-10-26 DIAGNOSIS — I35.0 NONRHEUMATIC AORTIC VALVE STENOSIS: ICD-10-CM

## 2021-10-26 DIAGNOSIS — I10 PRIMARY HYPERTENSION: ICD-10-CM

## 2021-10-26 DIAGNOSIS — I48.0 PAROXYSMAL ATRIAL FIBRILLATION: ICD-10-CM

## 2021-10-26 DIAGNOSIS — G47.33 OBSTRUCTIVE SLEEP APNEA SYNDROME: ICD-10-CM

## 2021-10-26 PROCEDURE — 99214 OFFICE O/P EST MOD 30 MIN: CPT | Mod: ,,, | Performed by: FAMILY MEDICINE

## 2021-10-26 PROCEDURE — 99214 PR OFFICE/OUTPT VISIT, EST, LEVL IV, 30-39 MIN: ICD-10-PCS | Mod: ,,, | Performed by: FAMILY MEDICINE

## 2021-10-26 RX ORDER — HYDROCODONE BITARTRATE AND ACETAMINOPHEN 10; 325 MG/1; MG/1
1 TABLET ORAL EVERY 6 HOURS PRN
Qty: 120 TABLET | Refills: 0 | Status: SHIPPED | OUTPATIENT
Start: 2021-10-26 | End: 2022-02-24 | Stop reason: SDUPTHER

## 2021-11-03 ENCOUNTER — HOSPITAL ENCOUNTER (OUTPATIENT)
Dept: RADIOLOGY | Facility: HOSPITAL | Age: 66
Discharge: HOME | End: 2021-11-03
Attending: FAMILY MEDICINE
Payer: MEDICARE

## 2021-11-03 DIAGNOSIS — E83.52 HYPERCALCEMIA: ICD-10-CM

## 2021-11-03 PROCEDURE — 78071 PARATHYRD PLANAR W/WO SUBTRJ: CPT | Mod: TC

## 2021-11-03 PROCEDURE — 78071 NM PARATHYROID SCAN WITH SPECT ROUTINE: ICD-10-PCS | Mod: 26,,, | Performed by: RADIOLOGY

## 2021-11-03 PROCEDURE — 78071 PARATHYRD PLANAR W/WO SUBTRJ: CPT | Mod: 26,,, | Performed by: RADIOLOGY

## 2021-11-03 PROCEDURE — A9500 TC99M SESTAMIBI: HCPCS

## 2021-11-08 ENCOUNTER — OFFICE VISIT (OUTPATIENT)
Dept: PRIMARY CARE CLINIC | Facility: CLINIC | Age: 66
End: 2021-11-08
Payer: MEDICARE

## 2021-11-08 VITALS
WEIGHT: 251 LBS | OXYGEN SATURATION: 98 % | RESPIRATION RATE: 18 BRPM | HEART RATE: 81 BPM | BODY MASS INDEX: 34 KG/M2 | DIASTOLIC BLOOD PRESSURE: 80 MMHG | SYSTOLIC BLOOD PRESSURE: 134 MMHG | HEIGHT: 72 IN

## 2021-11-08 DIAGNOSIS — Z95.2 HX OF AORTIC VALVE REPLACEMENT: ICD-10-CM

## 2021-11-08 DIAGNOSIS — D35.1 PARATHYROID ADENOMA: Primary | ICD-10-CM

## 2021-11-08 DIAGNOSIS — I10 PRIMARY HYPERTENSION: ICD-10-CM

## 2021-11-08 DIAGNOSIS — I35.0 NONRHEUMATIC AORTIC VALVE STENOSIS: ICD-10-CM

## 2021-11-08 DIAGNOSIS — I48.0 PAROXYSMAL ATRIAL FIBRILLATION: ICD-10-CM

## 2021-11-08 DIAGNOSIS — G47.33 OBSTRUCTIVE SLEEP APNEA SYNDROME: ICD-10-CM

## 2021-11-08 DIAGNOSIS — E78.00 PURE HYPERCHOLESTEROLEMIA: ICD-10-CM

## 2021-11-08 PROCEDURE — 99214 OFFICE O/P EST MOD 30 MIN: CPT | Mod: ,,, | Performed by: FAMILY MEDICINE

## 2021-11-08 PROCEDURE — 99214 PR OFFICE/OUTPT VISIT, EST, LEVL IV, 30-39 MIN: ICD-10-PCS | Mod: ,,, | Performed by: FAMILY MEDICINE

## 2021-11-09 PROCEDURE — 88305 TISSUE EXAM BY PATHOLOGIST: CPT | Mod: 26,,, | Performed by: PATHOLOGY

## 2021-11-09 PROCEDURE — 88305 TISSUE EXAM BY PATHOLOGIST: CPT | Mod: SUR,59 | Performed by: DERMATOLOGY

## 2021-11-09 PROCEDURE — 88305 PATHOLOGY, DERMATOLOGY: ICD-10-PCS | Mod: 26,,, | Performed by: PATHOLOGY

## 2021-11-10 ENCOUNTER — LAB REQUISITION (OUTPATIENT)
Dept: LAB | Facility: HOSPITAL | Age: 66
End: 2021-11-10
Attending: DERMATOLOGY
Payer: MEDICARE

## 2021-11-10 DIAGNOSIS — D49.2 NEOPLASM OF UNSPECIFIED BEHAVIOR OF BONE, SOFT TISSUE, AND SKIN: ICD-10-CM

## 2021-11-11 LAB
ESTROGEN SERPL-MCNC: NORMAL PG/ML
LAB AP GROSS DESCRIPTION: NORMAL
LAB AP LABORATORY NOTES: NORMAL
LAB AP SPEC A DDX: NORMAL
LAB AP SPEC A MORPHOLOGY: NORMAL
LAB AP SPEC A PROCEDURE: NORMAL
LAB AP SPEC B DDX: NORMAL
LAB AP SPEC B MORPHOLOGY: NORMAL
LAB AP SPEC B PROCEDURE: NORMAL
T3RU NFR SERPL: NORMAL %

## 2021-11-16 DIAGNOSIS — E21.5 PARATHYROID ABNORMALITY: Primary | ICD-10-CM

## 2021-11-16 RX ORDER — AMIODARONE HYDROCHLORIDE 200 MG/1
TABLET ORAL
Qty: 90 TABLET | Refills: 3 | Status: SHIPPED | OUTPATIENT
Start: 2021-11-16

## 2021-11-16 RX ORDER — AMLODIPINE BESYLATE 10 MG/1
10 TABLET ORAL DAILY
Qty: 90 TABLET | Refills: 3 | Status: SHIPPED | OUTPATIENT
Start: 2021-11-16 | End: 2022-12-01 | Stop reason: SDUPTHER

## 2021-11-16 RX ORDER — ATORVASTATIN CALCIUM 80 MG/1
TABLET, FILM COATED ORAL
Qty: 90 TABLET | Refills: 3 | Status: SHIPPED | OUTPATIENT
Start: 2021-11-16 | End: 2023-10-10 | Stop reason: SDUPTHER

## 2021-12-13 ENCOUNTER — OFFICE VISIT (OUTPATIENT)
Dept: SURGERY | Facility: CLINIC | Age: 66
End: 2021-12-13
Attending: FAMILY MEDICINE
Payer: MEDICARE

## 2021-12-13 DIAGNOSIS — E21.5 PARATHYROID ABNORMALITY: ICD-10-CM

## 2021-12-13 PROCEDURE — 99204 PR OFFICE/OUTPT VISIT, NEW, LEVL IV, 45-59 MIN: ICD-10-PCS | Mod: S$PBB,,, | Performed by: SURGERY

## 2021-12-13 PROCEDURE — 99213 OFFICE O/P EST LOW 20 MIN: CPT | Mod: PBBFAC | Performed by: SURGERY

## 2021-12-13 PROCEDURE — 99204 OFFICE O/P NEW MOD 45 MIN: CPT | Mod: S$PBB,,, | Performed by: SURGERY

## 2022-01-19 RX ORDER — LEVOTHYROXINE SODIUM 75 UG/1
75 TABLET ORAL
Qty: 90 TABLET | Refills: 3 | Status: CANCELLED | OUTPATIENT
Start: 2022-01-19

## 2022-01-19 RX ORDER — LISINOPRIL 10 MG/1
10 TABLET ORAL 2 TIMES DAILY
Qty: 180 TABLET | Refills: 1 | Status: SHIPPED | OUTPATIENT
Start: 2022-01-19 | End: 2023-02-21 | Stop reason: SDUPTHER

## 2022-01-20 RX ORDER — LEVOTHYROXINE SODIUM 75 UG/1
75 TABLET ORAL
Qty: 90 TABLET | Refills: 3 | Status: SHIPPED | OUTPATIENT
Start: 2022-01-20 | End: 2023-01-30 | Stop reason: SDUPTHER

## 2022-01-24 ENCOUNTER — PATIENT OUTREACH (OUTPATIENT)
Dept: FAMILY MEDICINE | Facility: CLINIC | Age: 67
End: 2022-01-24
Payer: MEDICARE

## 2022-01-24 RX ORDER — AZITHROMYCIN 250 MG/1
TABLET, FILM COATED ORAL
Qty: 6 TABLET | Refills: 1 | Status: SHIPPED | OUTPATIENT
Start: 2022-01-24 | End: 2022-01-29

## 2022-01-24 RX ORDER — BENZONATATE 200 MG/1
200 CAPSULE ORAL 3 TIMES DAILY PRN
Qty: 30 CAPSULE | Refills: 3 | Status: SHIPPED | OUTPATIENT
Start: 2022-01-24 | End: 2022-02-03

## 2022-01-24 RX ORDER — PREDNISONE 20 MG/1
40 TABLET ORAL DAILY
Qty: 10 TABLET | Refills: 0 | Status: SHIPPED | OUTPATIENT
Start: 2022-01-24 | End: 2022-08-15

## 2022-02-14 PROCEDURE — 88305 TISSUE EXAM BY PATHOLOGIST: CPT | Mod: SUR | Performed by: DERMATOLOGY

## 2022-02-14 PROCEDURE — 88305 PATHOLOGY, DERMATOLOGY: ICD-10-PCS | Mod: 26,,, | Performed by: PATHOLOGY

## 2022-02-14 PROCEDURE — 88305 TISSUE EXAM BY PATHOLOGIST: CPT | Mod: 26,,, | Performed by: PATHOLOGY

## 2022-02-15 ENCOUNTER — LAB REQUISITION (OUTPATIENT)
Dept: LAB | Facility: HOSPITAL | Age: 67
End: 2022-02-15
Attending: DERMATOLOGY
Payer: MEDICARE

## 2022-02-15 DIAGNOSIS — D49.2 NEOPLASM OF UNSPECIFIED BEHAVIOR OF BONE, SOFT TISSUE, AND SKIN: ICD-10-CM

## 2022-02-16 DIAGNOSIS — C43.9 MALIGNANT MELANOMA, UNSPECIFIED SITE: Primary | ICD-10-CM

## 2022-02-16 LAB
DHEA SERPL-MCNC: NORMAL
ESTROGEN SERPL-MCNC: NORMAL PG/ML
LAB AP GROSS DESCRIPTION: NORMAL
LAB AP LABORATORY NOTES: NORMAL
LAB AP SPEC A DDX: NORMAL
LAB AP SPEC A MORPHOLOGY: NORMAL
LAB AP SPEC A PROCEDURE: NORMAL
LAB AP SPEC B DDX: NORMAL
LAB AP SPEC B MORPHOLOGY: NORMAL
LAB AP SPEC B PROCEDURE: NORMAL
T3RU NFR SERPL: NORMAL %

## 2022-02-17 ENCOUNTER — HOSPITAL ENCOUNTER (OUTPATIENT)
Dept: RADIOLOGY | Facility: HOSPITAL | Age: 67
Discharge: HOME | End: 2022-02-17
Attending: FAMILY MEDICINE
Payer: MEDICARE

## 2022-02-17 DIAGNOSIS — C43.9 MALIGNANT MELANOMA, UNSPECIFIED SITE: ICD-10-CM

## 2022-02-17 PROCEDURE — 71046 XR CHEST PA AND LATERAL: ICD-10-PCS | Mod: 26,,, | Performed by: RADIOLOGY

## 2022-02-17 PROCEDURE — 71046 X-RAY EXAM CHEST 2 VIEWS: CPT | Mod: TC

## 2022-02-17 PROCEDURE — 71046 X-RAY EXAM CHEST 2 VIEWS: CPT | Mod: 26,,, | Performed by: RADIOLOGY

## 2022-02-24 RX ORDER — HYDROCODONE BITARTRATE AND ACETAMINOPHEN 10; 325 MG/1; MG/1
1 TABLET ORAL EVERY 6 HOURS PRN
Qty: 120 TABLET | Refills: 0 | Status: SHIPPED | OUTPATIENT
Start: 2022-02-24 | End: 2022-08-31 | Stop reason: SDUPTHER

## 2022-02-24 RX ORDER — METHOCARBAMOL 750 MG/1
TABLET, FILM COATED ORAL
Qty: 240 TABLET | Refills: 1 | Status: SHIPPED | OUTPATIENT
Start: 2022-02-24

## 2022-03-11 DIAGNOSIS — Z71.89 COMPLEX CARE COORDINATION: ICD-10-CM

## 2022-03-28 RX ORDER — ALPRAZOLAM 0.25 MG/1
0.25 TABLET ORAL DAILY PRN
Qty: 90 TABLET | Refills: 1 | Status: SHIPPED | OUTPATIENT
Start: 2022-03-28 | End: 2022-09-29 | Stop reason: SDUPTHER

## 2022-04-06 ENCOUNTER — OFFICE VISIT (OUTPATIENT)
Dept: CARDIOLOGY | Facility: CLINIC | Age: 67
End: 2022-04-06
Payer: MEDICARE

## 2022-04-06 VITALS
OXYGEN SATURATION: 97 % | HEIGHT: 72 IN | DIASTOLIC BLOOD PRESSURE: 88 MMHG | SYSTOLIC BLOOD PRESSURE: 128 MMHG | BODY MASS INDEX: 32.78 KG/M2 | WEIGHT: 242 LBS | HEART RATE: 88 BPM

## 2022-04-06 DIAGNOSIS — Z95.3 HISTORY OF AORTIC VALVE REPLACEMENT WITH BIOPROSTHETIC VALVE: ICD-10-CM

## 2022-04-06 DIAGNOSIS — I48.91 ATRIAL FIBRILLATION, UNSPECIFIED TYPE: Primary | ICD-10-CM

## 2022-04-06 PROCEDURE — 99214 OFFICE O/P EST MOD 30 MIN: CPT | Mod: S$PBB,,, | Performed by: NURSE PRACTITIONER

## 2022-04-06 PROCEDURE — 99214 PR OFFICE/OUTPT VISIT, EST, LEVL IV, 30-39 MIN: ICD-10-PCS | Mod: S$PBB,,, | Performed by: NURSE PRACTITIONER

## 2022-04-06 PROCEDURE — 93010 ELECTROCARDIOGRAM REPORT: CPT | Mod: S$PBB,,, | Performed by: INTERNAL MEDICINE

## 2022-04-06 PROCEDURE — 93005 ELECTROCARDIOGRAM TRACING: CPT | Mod: PBBFAC | Performed by: INTERNAL MEDICINE

## 2022-04-06 PROCEDURE — 93010 EKG 12-LEAD: ICD-10-PCS | Mod: S$PBB,,, | Performed by: INTERNAL MEDICINE

## 2022-04-06 PROCEDURE — 99214 OFFICE O/P EST MOD 30 MIN: CPT | Mod: PBBFAC | Performed by: NURSE PRACTITIONER

## 2022-04-07 NOTE — PROGRESS NOTES
"[facility] note        DATE OF SERVICE: 04/07/2022       PCP: Miguel Angel Gregory III, DO      CHIEF COMPLAINT:   Chief Complaint   Patient presents with    Chest Pain     With drinking at times- "soreness thing". Lasts 3-5 min.         HISTORY OF PRESENT ILLNESS:  Shreyas Man is a 67 y.o. male with a PMH of   Past Medical History:   Diagnosis Date    Anemia     Atrial fibrillation     GERD (gastroesophageal reflux disease)     Hyperlipidemia     Hypertension     Sleep apnea      who presents for routing follow up. He reports episodes of an atypical chest discomfort after swallowing at times but also milder cases with exertion at times. He does not feel is r/t his heart.   Chief Complaint   Patient presents with    Chest Pain     With drinking at times- "soreness thing". Lasts 3-5 min.             PAST MEDICAL HISTORY:  Past Medical History:   Diagnosis Date    Anemia     Atrial fibrillation     GERD (gastroesophageal reflux disease)     Hyperlipidemia     Hypertension     Sleep apnea        PAST SURGICAL HISTORY:  Past Surgical History:   Procedure Laterality Date    CARDIAC CATHETERIZATION      CARDIAC VALVE SURGERY      CORONARY ARTERY BYPASS GRAFT      SPINE SURGERY         SOCIAL HISTORY:  Social History     Socioeconomic History    Marital status:    Tobacco Use    Smoking status: Former Smoker     Packs/day: 1.00    Smokeless tobacco: Never Used    Tobacco comment: quit 10+ years ago   Substance and Sexual Activity    Alcohol use: Never    Drug use: Never       FAMILY HISTORY:  Family History   Problem Relation Age of Onset    Heart attack Mother     Heart attack Father     Heart disease Father          ALLERGIES:  Review of patient's allergies indicates:   Allergen Reactions    Levaquin [levofloxacin]     Toradol [ketorolac]     Wasp sting [allergen ext-venom-honey bee]     Wasp venom Itching and Swelling    Zyrtec [cetirizine]     Tramadol hcl " Itching        MEDICATIONS:    Current Outpatient Medications:     ALPRAZolam (XANAX) 0.25 MG tablet, Take 1 tablet (0.25 mg total) by mouth daily as needed for Anxiety., Disp: 90 tablet, Rfl: 1    amLODIPine (NORVASC) 10 MG tablet, Take 1 tablet (10 mg total) by mouth once daily., Disp: 90 tablet, Rfl: 3    atorvastatin (LIPITOR) 80 MG tablet, TAKE 1 TABLET DAILY, Disp: 90 tablet, Rfl: 3    EScitalopram oxalate (LEXAPRO) 10 MG tablet, TAKE 1 TABLET DAILY, Disp: 90 tablet, Rfl: 3    HYDROcodone-acetaminophen (NORCO)  mg per tablet, Take 1 tablet by mouth every 6 (six) hours as needed for Pain., Disp: 120 tablet, Rfl: 0    levothyroxine (SYNTHROID) 75 MCG tablet, Take 1 tablet (75 mcg total) by mouth before breakfast., Disp: 90 tablet, Rfl: 3    lisinopriL 10 MG tablet, Take 1 tablet (10 mg total) by mouth 2 (two) times daily., Disp: 180 tablet, Rfl: 1    methocarbamoL (ROBAXIN) 750 MG Tab, Take two tablets 4 times a day as needed., Disp: 240 tablet, Rfl: 1    PACERONE 200 mg Tab, TAKE 1 TABLET DAILY, Disp: 90 tablet, Rfl: 3    predniSONE (DELTASONE) 20 MG tablet, Take 2 tablets (40 mg total) by mouth once daily., Disp: 10 tablet, Rfl: 0  Medications have been reviewed but not  reconciled. He did not bring his meds today.    PHYSICAL EXAM:  /88   Pulse 88   Ht 6' (1.829 m)   Wt 109.8 kg (242 lb)   SpO2 97%   BMI 32.82 kg/m²   Wt Readings from Last 3 Encounters:   04/06/22 109.8 kg (242 lb)   11/08/21 113.9 kg (251 lb)   10/26/21 113.9 kg (251 lb)      Body mass index is 32.82 kg/m².    Physical Exam  Vitals and nursing note reviewed.   Constitutional:       Appearance: Normal appearance. He is obese.   HENT:      Head: Normocephalic and atraumatic.   Eyes:      Pupils: Pupils are equal, round, and reactive to light.   Cardiovascular:      Rate and Rhythm: Normal rate and regular rhythm.      Pulses: Normal pulses.      Heart sounds: Normal heart sounds.      Comments: Good valve  click  Pulmonary:      Effort: Pulmonary effort is normal.      Breath sounds: Normal breath sounds.   Abdominal:      General: Bowel sounds are normal.      Palpations: Abdomen is soft.   Musculoskeletal:      Cervical back: Neck supple.      Right lower leg: No edema.      Left lower leg: No edema.   Skin:     General: Skin is warm and dry.      Capillary Refill: Capillary refill takes less than 2 seconds.   Neurological:      General: No focal deficit present.      Mental Status: He is alert and oriented to person, place, and time.   Psychiatric:         Mood and Affect: Mood normal.         Behavior: Behavior normal.         LABS REVIEWED:  Lab Results   Component Value Date    WBC 5.85 10/19/2021    RBC 5.09 10/19/2021    HGB 16.5 10/19/2021    HCT 48.3 10/19/2021    MCV 94.9 10/19/2021    MCH 32.4 (H) 10/19/2021    MCHC 34.2 10/19/2021    RDW 12.9 10/19/2021     10/19/2021    MPV 10.0 10/19/2021    NRBC 0.0 10/19/2021     Lab Results   Component Value Date     10/19/2021    K 5.1 10/19/2021     (H) 10/19/2021    CO2 32 10/19/2021    BUN 21 (H) 10/19/2021     Lab Results   Component Value Date    AST 28 10/19/2021    ALT 48 10/19/2021     Lab Results   Component Value Date    GLU 94 10/19/2021    HGBA1C 5.0 10/19/2021     Lab Results   Component Value Date    CHOL 154 10/19/2021    HDL 53 10/19/2021    TRIG 104 10/19/2021    CHOLHDL 2.9 10/19/2021       CARDIAC STUDIES REVIEWED:EKG: {RSR with HR 85 bpm; NSTWA     echocardiogram  Results for orders placed during the hospital encounter of 04/06/21    Echo Color Flow Doppler? Yes; Bubble Contrast? No    Interpretation Summary  · The left ventricle is normal in size with eccentric hypertrophy and normal systolic function.  · The estimated ejection fraction is 55%.  · Normal left ventricular diastolic function.  · Atrial fibrillation not observed.  · Mild left atrial enlargement.  · There is a bioprosthetic aortic valve present.  · The aortic  valve mean gradient is 18 mmHg with a dimensionless index of 0.48.  · Mild mitral regurgitation.  · Mild tricuspid regurgitation.              ASSESSMENT:   Patient Active Problem List   Diagnosis    Nonrheumatic aortic valve stenosis    Hx of aortic valve replacement    Hypertension    Hyperlipidemia    Atrial fibrillation    Sleep apnea    Coronary artery disease involving native coronary artery of native heart    Low back pain            Problem List Items Addressed This Visit        Cardiac/Vascular    Atrial fibrillation - Primary    Relevant Orders    EKG 12-lead (Completed)      Other Visit Diagnoses     History of aortic valve replacement with bioprosthetic valve        Relevant Orders    Echo Saline Bubble? No           PLAN:If bioprosthetic aortic valve is functioning well plan for EST.   Orders Placed This Encounter   Procedures    EKG 12-lead     Standing Status:   Future     Number of Occurrences:   1     Standing Expiration Date:   4/6/2023    Echo Saline Bubble? No     Standing Status:   Future     Standing Expiration Date:   4/6/2023     Scheduling Instructions:      If no issue with his aortic valve will schedule an EST too.     Order Specific Question:   Limited Echo?     Answer:   No     Order Specific Question:   Saline Bubble?     Answer:   No     Order Specific Question:   Ultrasound enhancing contrast?     Answer:   No     Order Specific Question:   Physician to read study:     Answer:   KARRIE GOMES [[id number]]     Order Specific Question:   Adult congenital patient?     Answer:   No     Order Specific Question:   Oncology Patient?     Answer:   No     Order Specific Question:   Release to patient     Answer:   Immediate      RTC: 3 months

## 2022-04-13 ENCOUNTER — HOSPITAL ENCOUNTER (OUTPATIENT)
Dept: CARDIOLOGY | Facility: HOSPITAL | Age: 67
Discharge: HOME | End: 2022-04-13
Attending: NURSE PRACTITIONER
Payer: MEDICARE

## 2022-04-13 DIAGNOSIS — Z95.3 HISTORY OF AORTIC VALVE REPLACEMENT WITH BIOPROSTHETIC VALVE: ICD-10-CM

## 2022-04-13 PROCEDURE — 93306 TTE W/DOPPLER COMPLETE: CPT

## 2022-04-13 PROCEDURE — 93306 TTE W/DOPPLER COMPLETE: CPT | Mod: 26,,, | Performed by: INTERNAL MEDICINE

## 2022-04-13 PROCEDURE — 93306 ECHO (CUPID ONLY): ICD-10-PCS | Mod: 26,,, | Performed by: INTERNAL MEDICINE

## 2022-04-19 LAB
AV INDEX (PROSTH): 0.5
AV VALVE AREA: 1.91 CM2
CV ECHO LV RWT: 0.5 CM
DOP CALC AO VTI: 53.93 CM
DOP CALC LVOT AREA: 3.8 CM2
DOP CALC LVOT DIAMETER: 2.2 CM
DOP CALC LVOT STROKE VOLUME: 102.93 CM3
DOP CALCLVOT PEAK VEL VTI: 27.09 CM
ECHO LV POSTERIOR WALL: 1.2 CM (ref 0.6–1.1)
EJECTION FRACTION: 65 %
FRACTIONAL SHORTENING: 29 % (ref 28–44)
INTERVENTRICULAR SEPTUM: 1.3 CM (ref 0.6–1.1)
LEFT ATRIUM SIZE: 5.3 CM
LEFT INTERNAL DIMENSION IN SYSTOLE: 3.4 CM (ref 2.1–4)
LEFT VENTRICULAR INTERNAL DIMENSION IN DIASTOLE: 4.8 CM (ref 3.5–6)
LEFT VENTRICULAR MASS: 232.25 G

## 2022-05-02 NOTE — PROGRESS NOTES
Please schedule EST Telephone Encounter by Mary Lou Morse [facility] 

## 2022-05-03 DIAGNOSIS — R07.9 CHEST PAIN, UNSPECIFIED TYPE: Primary | ICD-10-CM

## 2022-05-03 DIAGNOSIS — I48.91 ATRIAL FIBRILLATION, UNSPECIFIED TYPE: ICD-10-CM

## 2022-05-11 ENCOUNTER — HOSPITAL ENCOUNTER (OUTPATIENT)
Dept: CARDIOLOGY | Facility: HOSPITAL | Age: 67
Discharge: HOME | End: 2022-05-11
Attending: NURSE PRACTITIONER
Payer: MEDICARE

## 2022-05-11 VITALS
BODY MASS INDEX: 32.23 KG/M2 | WEIGHT: 238 LBS | HEART RATE: 73 BPM | HEIGHT: 72 IN | SYSTOLIC BLOOD PRESSURE: 157 MMHG | DIASTOLIC BLOOD PRESSURE: 92 MMHG

## 2022-05-11 DIAGNOSIS — R07.9 CHEST PAIN, UNSPECIFIED TYPE: ICD-10-CM

## 2022-05-11 DIAGNOSIS — I48.91 ATRIAL FIBRILLATION, UNSPECIFIED TYPE: ICD-10-CM

## 2022-05-11 PROCEDURE — 93018 CV STRESS TEST I&R ONLY: CPT | Mod: ,,, | Performed by: INTERNAL MEDICINE

## 2022-05-11 PROCEDURE — 93016 EXERCISE STRESS - EKG (CUPID ONLY): ICD-10-PCS | Mod: ,,, | Performed by: NURSE PRACTITIONER

## 2022-05-11 PROCEDURE — 93018 EXERCISE STRESS - EKG (CUPID ONLY): ICD-10-PCS | Mod: ,,, | Performed by: INTERNAL MEDICINE

## 2022-05-11 PROCEDURE — 93016 CV STRESS TEST SUPVJ ONLY: CPT | Mod: ,,, | Performed by: NURSE PRACTITIONER

## 2022-05-11 PROCEDURE — 93017 CV STRESS TEST TRACING ONLY: CPT

## 2022-05-18 PROCEDURE — 88305 PATHOLOGY, DERMATOLOGY: ICD-10-PCS | Mod: 26,,, | Performed by: PATHOLOGY

## 2022-05-18 PROCEDURE — 88305 TISSUE EXAM BY PATHOLOGIST: CPT | Mod: 26,,, | Performed by: PATHOLOGY

## 2022-05-18 PROCEDURE — 88305 TISSUE EXAM BY PATHOLOGIST: CPT | Mod: SUR | Performed by: DERMATOLOGY

## 2022-05-19 ENCOUNTER — LAB REQUISITION (OUTPATIENT)
Dept: LAB | Facility: HOSPITAL | Age: 67
End: 2022-05-19
Attending: DERMATOLOGY
Payer: MEDICARE

## 2022-05-19 DIAGNOSIS — D49.2 NEOPLASM OF UNSPECIFIED BEHAVIOR OF BONE, SOFT TISSUE, AND SKIN: ICD-10-CM

## 2022-06-21 LAB
CV STRESS BASE HR: 73 BPM
DIASTOLIC BLOOD PRESSURE: 92 MMHG
OHS CV CPX 1 MINUTE RECOVERY HEART RATE: 94 BPM
OHS CV CPX 85 PERCENT MAX PREDICTED HEART RATE MALE: 131
OHS CV CPX ESTIMATED METS: 7
OHS CV CPX MAX PREDICTED HEART RATE: 154
OHS CV CPX PATIENT IS FEMALE: 0
OHS CV CPX PATIENT IS MALE: 1
OHS CV CPX PEAK DIASTOLIC BLOOD PRESSURE: 99 MMHG
OHS CV CPX PEAK HEAR RATE: 135 BPM
OHS CV CPX PEAK RATE PRESSURE PRODUCT: NORMAL
OHS CV CPX PEAK SYSTOLIC BLOOD PRESSURE: 213 MMHG
OHS CV CPX PERCENT MAX PREDICTED HEART RATE ACHIEVED: 88
OHS CV CPX RATE PRESSURE PRODUCT PRESENTING: NORMAL
STRESS ECHO POST EXERCISE DUR MIN: 5 MINUTES
STRESS ECHO POST EXERCISE DUR SEC: 38 SECONDS
SYSTOLIC BLOOD PRESSURE: 157 MMHG

## 2022-06-22 ENCOUNTER — OFFICE VISIT (OUTPATIENT)
Dept: CARDIOLOGY | Facility: CLINIC | Age: 67
End: 2022-06-22
Payer: MEDICARE

## 2022-06-22 VITALS
HEIGHT: 72 IN | WEIGHT: 235 LBS | SYSTOLIC BLOOD PRESSURE: 122 MMHG | OXYGEN SATURATION: 98 % | BODY MASS INDEX: 31.83 KG/M2 | DIASTOLIC BLOOD PRESSURE: 84 MMHG | HEART RATE: 84 BPM

## 2022-06-22 DIAGNOSIS — Z01.812 PRE-OPERATIVE LABORATORY EXAMINATION: Primary | ICD-10-CM

## 2022-06-22 DIAGNOSIS — R93.1 ABNORMAL FINDINGS ON DIAGNOSTIC IMAGING OF HEART AND CORONARY CIRCULATION: ICD-10-CM

## 2022-06-22 DIAGNOSIS — Z95.1 HX OF CABG: ICD-10-CM

## 2022-06-22 DIAGNOSIS — I20.89 ANGINAL EQUIVALENT: ICD-10-CM

## 2022-06-22 PROCEDURE — 99214 OFFICE O/P EST MOD 30 MIN: CPT | Mod: S$PBB,,, | Performed by: NURSE PRACTITIONER

## 2022-06-22 PROCEDURE — 99214 PR OFFICE/OUTPT VISIT, EST, LEVL IV, 30-39 MIN: ICD-10-PCS | Mod: S$PBB,,, | Performed by: NURSE PRACTITIONER

## 2022-06-22 PROCEDURE — 99214 OFFICE O/P EST MOD 30 MIN: CPT | Mod: PBBFAC | Performed by: NURSE PRACTITIONER

## 2022-06-22 RX ORDER — DIAZEPAM 2 MG/1
5 TABLET ORAL ONCE
Status: CANCELLED | OUTPATIENT
Start: 2022-08-01

## 2022-06-22 RX ORDER — DIPHENHYDRAMINE HCL 25 MG
50 CAPSULE ORAL
Status: CANCELLED | OUTPATIENT
Start: 2022-08-01

## 2022-06-22 NOTE — PROGRESS NOTES
[facility] note        DATE OF SERVICE: 06/22/2022       PCP: Miguel Angel Gregory III, DO      CHIEF COMPLAINT:   Chief Complaint   Patient presents with    Results     Patient denies any cardiac complaints today.        HISTORY OF PRESENT ILLNESS:  Shreyas Man is a 67 y.o. male with a PMH of   Past Medical History:   Diagnosis Date    Anemia     Atrial fibrillation     GERD (gastroesophageal reflux disease)     Hyperlipidemia     Hypertension     Sleep apnea      who presents for follow up to discuss results of EST/Echo. He states that he has had some NUÑEZ (His anginal equivalent.) but not as bad as the episode just prior to his EST.   Chief Complaint   Patient presents with    Results     Patient denies any cardiac complaints today.            PAST MEDICAL HISTORY:  Past Medical History:   Diagnosis Date    Anemia     Atrial fibrillation     GERD (gastroesophageal reflux disease)     Hyperlipidemia     Hypertension     Sleep apnea        PAST SURGICAL HISTORY:  Past Surgical History:   Procedure Laterality Date    CARDIAC CATHETERIZATION      CARDIAC VALVE SURGERY      CORONARY ARTERY BYPASS GRAFT      SPINE SURGERY         SOCIAL HISTORY:  Social History     Socioeconomic History    Marital status:    Tobacco Use    Smoking status: Former Smoker     Packs/day: 1.00    Smokeless tobacco: Never Used    Tobacco comment: quit 10+ years ago   Substance and Sexual Activity    Alcohol use: Never    Drug use: Never       FAMILY HISTORY:  Family History   Problem Relation Age of Onset    Heart attack Mother     Heart attack Father     Heart disease Father          ALLERGIES:  Review of patient's allergies indicates:   Allergen Reactions    Levaquin [levofloxacin]     Toradol [ketorolac]     Wasp sting [allergen ext-venom-honey bee]     Wasp venom Itching and Swelling    Zyrtec [cetirizine]     Tramadol hcl Itching        MEDICATIONS:    Current Outpatient  Medications:     ALPRAZolam (XANAX) 0.25 MG tablet, Take 1 tablet (0.25 mg total) by mouth daily as needed for Anxiety., Disp: 90 tablet, Rfl: 1    amLODIPine (NORVASC) 10 MG tablet, Take 1 tablet (10 mg total) by mouth once daily., Disp: 90 tablet, Rfl: 3    atorvastatin (LIPITOR) 80 MG tablet, TAKE 1 TABLET DAILY, Disp: 90 tablet, Rfl: 3    EScitalopram oxalate (LEXAPRO) 10 MG tablet, TAKE 1 TABLET DAILY, Disp: 90 tablet, Rfl: 3    HYDROcodone-acetaminophen (NORCO)  mg per tablet, Take 1 tablet by mouth every 6 (six) hours as needed for Pain., Disp: 120 tablet, Rfl: 0    levothyroxine (SYNTHROID) 75 MCG tablet, Take 1 tablet (75 mcg total) by mouth before breakfast., Disp: 90 tablet, Rfl: 3    lisinopriL 10 MG tablet, Take 1 tablet (10 mg total) by mouth 2 (two) times daily., Disp: 180 tablet, Rfl: 1    methocarbamoL (ROBAXIN) 750 MG Tab, Take two tablets 4 times a day as needed., Disp: 240 tablet, Rfl: 1    PACERONE 200 mg Tab, TAKE 1 TABLET DAILY, Disp: 90 tablet, Rfl: 3    predniSONE (DELTASONE) 20 MG tablet, Take 2 tablets (40 mg total) by mouth once daily., Disp: 10 tablet, Rfl: 0  Medications have been reviewed and but reconciled. He did not bring he meds today.  PHYSICAL EXAM:  /84 (BP Location: Left arm, Patient Position: Sitting)   Pulse 84   Ht 6' (1.829 m)   Wt 106.6 kg (235 lb)   SpO2 98%   BMI 31.87 kg/m²   Wt Readings from Last 3 Encounters:   06/22/22 106.6 kg (235 lb)   05/11/22 108 kg (238 lb)   04/06/22 109.8 kg (242 lb)      Body mass index is 31.87 kg/m².    Physical Exam  Vitals and nursing note reviewed.   Constitutional:       Appearance: Normal appearance. He is obese.   HENT:      Head: Normocephalic and atraumatic.   Eyes:      Pupils: Pupils are equal, round, and reactive to light.   Neck:      Vascular: No carotid bruit.   Cardiovascular:      Rate and Rhythm: Normal rate and regular rhythm.      Pulses: Normal pulses.      Heart sounds: Murmur heard.       Comments: I/VI ALEXANDER RUSB  Pulmonary:      Effort: Pulmonary effort is normal.      Breath sounds: Normal breath sounds.   Abdominal:      General: Bowel sounds are normal.      Palpations: Abdomen is soft.   Musculoskeletal:      Cervical back: Neck supple.      Right lower leg: No edema.      Left lower leg: No edema.   Skin:     General: Skin is warm and dry.      Capillary Refill: Capillary refill takes less than 2 seconds.   Neurological:      General: No focal deficit present.      Mental Status: He is oriented to person, place, and time.   Psychiatric:         Mood and Affect: Mood normal.         Behavior: Behavior normal.         LABS REVIEWED:  Lab Results   Component Value Date    WBC 5.85 10/19/2021    RBC 5.09 10/19/2021    HGB 16.5 10/19/2021    HCT 48.3 10/19/2021    MCV 94.9 10/19/2021    MCH 32.4 (H) 10/19/2021    MCHC 34.2 10/19/2021    RDW 12.9 10/19/2021     10/19/2021    MPV 10.0 10/19/2021    NRBC 0.0 10/19/2021     Lab Results   Component Value Date     05/10/2022    K 4.4 05/10/2022     (H) 05/10/2022    CO2 28 05/10/2022    BUN 14 05/10/2022     Lab Results   Component Value Date    AST 28 10/19/2021    ALT 48 10/19/2021     Lab Results   Component Value Date    GLU 80 05/10/2022    HGBA1C 5.0 10/19/2021     Lab Results   Component Value Date    CHOL 154 10/19/2021    HDL 53 10/19/2021    TRIG 104 10/19/2021    CHOLHDL 2.9 10/19/2021       CARDIAC STUDIES REVIEWED:Stress test  Results for orders placed during the hospital encounter of 05/11/22    Exercise Stress - EKG    Interpretation Summary    The EKG portion of this study is positive for ischemia.    The patient reported no chest pain during the stress test.    During stress, atrial fibrillation is noted.    Conclusion:  Abnormal EST suggestive of coronary ischemia.     echocardiogram  Results for orders placed during the hospital encounter of 04/13/22    Echo Saline Bubble? No    Interpretation Summary  · The left  ventricle is normal in size with mild concentric hypertrophy and  · The estimated ejection fraction is 65%.  · Left ventricular diastolic dysfunction.  · Bioprosthetic aortic valve, well seated, RADHA 1.76 cm2; PPG/MPG 35/14 mmHg.  · Mild mitral regurgitation.  · Mild left atrial enlargement.  · Normal right ventricular size.  · Mild tricuspid regurgitation.  · Mild pulmonic regurgitation.              ASSESSMENT:   Patient Active Problem List   Diagnosis    Nonrheumatic aortic valve stenosis    Hx of aortic valve replacement    Hypertension    Hyperlipidemia    Atrial fibrillation    Sleep apnea    Coronary artery disease involving native coronary artery of native heart    Low back pain            Problem List Items Addressed This Visit    None     Visit Diagnoses     Pre-operative laboratory examination    -  Primary    Relevant Orders    CBC Auto Differential    Comprehensive Metabolic Panel    Hx of CABG        Relevant Orders    CBC Auto Differential    Case Request-Cath Lab: Left heart cath, Percutaneous coronary intervention (Completed)    Abnormal findings on diagnostic imaging of heart and coronary circulation         Relevant Orders    CBC Auto Differential    Case Request-Cath Lab: Left heart cath, Percutaneous coronary intervention (Completed)    Anginal equivalent        Relevant Orders    Case Request-Cath Lab: Left heart cath, Percutaneous coronary intervention (Completed)           PLAN: d/w the patient and his wife the results of his EST as well as LHC. The patient wishes to proceed.  Orders Placed This Encounter   Procedures    CBC Auto Differential     Standing Status:   Future     Standing Expiration Date:   8/21/2023    Comprehensive Metabolic Panel     Standing Status:   Future     Standing Expiration Date:   8/21/2023    Case Request-Cath Lab: Left heart cath, Percutaneous coronary intervention     Standing Status:   Standing     Number of Occurrences:   1     Order Specific Question:    CPT Code:     Answer:   NC CATH PLACE/CORON ANGIO, IMG SUPER/INTERP,W LEFT HEART VENTRICULOGRAPHY [38068]     Order Specific Question:   CPT Code:     Answer:   NC  [53157]     Order Specific Question:   CPT Code:     Answer:   NC , ADD'L VESSEL [29262]     Order Specific Question:   Medical Necessity:     Answer:   Medically Non-Urgent [100]     Order Specific Question:   Is an on-site pathologist required for this procedure?     Answer:   N/A     Order Specific Question:   Pre-op Diagnosis     Answer:   Chest pain [286022]     Order Specific Question:   Pre-op Diagnosis     Answer:   Abnormal results of cardiovascular function studies [725541]     Order Specific Question:   Pre-op Diagnosis     Answer:   Anginal equivalent [[id number]]     Order Specific Question:   Pre-op Diagnosis     Answer:   Hx of CABG [234617]      RTC: after LHC with poss pci

## 2022-08-01 ENCOUNTER — HOSPITAL ENCOUNTER (OUTPATIENT)
Facility: HOSPITAL | Age: 67
Discharge: HOME | End: 2022-08-01
Attending: INTERNAL MEDICINE | Admitting: INTERNAL MEDICINE
Payer: MEDICARE

## 2022-08-01 VITALS
HEART RATE: 57 BPM | WEIGHT: 236.69 LBS | RESPIRATION RATE: 15 BRPM | TEMPERATURE: 98 F | BODY MASS INDEX: 32.06 KG/M2 | OXYGEN SATURATION: 95 % | SYSTOLIC BLOOD PRESSURE: 108 MMHG | HEIGHT: 72 IN | DIASTOLIC BLOOD PRESSURE: 61 MMHG

## 2022-08-01 DIAGNOSIS — I20.89 ANGINAL EQUIVALENT: ICD-10-CM

## 2022-08-01 DIAGNOSIS — R93.1 ABNORMAL FINDINGS ON DIAGNOSTIC IMAGING OF HEART AND CORONARY CIRCULATION: ICD-10-CM

## 2022-08-01 DIAGNOSIS — R07.9 CHEST PAIN: ICD-10-CM

## 2022-08-01 DIAGNOSIS — Z95.1 HX OF CABG: ICD-10-CM

## 2022-08-01 DIAGNOSIS — R94.30 ABNORMAL RESULTS OF CARDIOVASCULAR FUNCTION STUDIES: Primary | ICD-10-CM

## 2022-08-01 DIAGNOSIS — Z01.812 PRE-OPERATIVE LABORATORY EXAMINATION: ICD-10-CM

## 2022-08-01 LAB — CATH EF QUANTITATIVE: 60 %

## 2022-08-01 PROCEDURE — C1894 INTRO/SHEATH, NON-LASER: HCPCS | Performed by: INTERNAL MEDICINE

## 2022-08-01 PROCEDURE — 63600175 PHARM REV CODE 636 W HCPCS: Performed by: INTERNAL MEDICINE

## 2022-08-01 PROCEDURE — 93459 L HRT ART/GRFT ANGIO: CPT | Performed by: INTERNAL MEDICINE

## 2022-08-01 PROCEDURE — 93459: ICD-10-PCS | Mod: 26,,, | Performed by: INTERNAL MEDICINE

## 2022-08-01 PROCEDURE — 99152 MOD SED SAME PHYS/QHP 5/>YRS: CPT | Performed by: INTERNAL MEDICINE

## 2022-08-01 PROCEDURE — 25000003 PHARM REV CODE 250: Performed by: INTERNAL MEDICINE

## 2022-08-01 PROCEDURE — 93459 L HRT ART/GRFT ANGIO: CPT | Mod: 26,,, | Performed by: INTERNAL MEDICINE

## 2022-08-01 PROCEDURE — 25500020 PHARM REV CODE 255: Performed by: INTERNAL MEDICINE

## 2022-08-01 PROCEDURE — 99153 MOD SED SAME PHYS/QHP EA: CPT | Performed by: INTERNAL MEDICINE

## 2022-08-01 PROCEDURE — C1760 CLOSURE DEV, VASC: HCPCS | Performed by: INTERNAL MEDICINE

## 2022-08-01 PROCEDURE — 27201423 OPTIME MED/SURG SUP & DEVICES STERILE SUPPLY: Performed by: INTERNAL MEDICINE

## 2022-08-01 DEVICE — KIT ANGIO SEAL 6FR VIP: Type: IMPLANTABLE DEVICE | Site: GROIN | Status: FUNCTIONAL

## 2022-08-01 RX ORDER — DIAZEPAM 5 MG/1
TABLET ORAL
Status: DISCONTINUED | OUTPATIENT
Start: 2022-08-01 | End: 2022-08-01 | Stop reason: HOSPADM

## 2022-08-01 RX ORDER — DILTIAZEM HYDROCHLORIDE 5 MG/ML
INJECTION INTRAVENOUS
Status: DISCONTINUED | OUTPATIENT
Start: 2022-08-01 | End: 2022-08-01 | Stop reason: HOSPADM

## 2022-08-01 RX ORDER — ACETAMINOPHEN 325 MG/1
650 TABLET ORAL EVERY 4 HOURS PRN
Status: DISCONTINUED | OUTPATIENT
Start: 2022-08-01 | End: 2022-08-01 | Stop reason: HOSPADM

## 2022-08-01 RX ORDER — FENTANYL CITRATE 50 UG/ML
INJECTION, SOLUTION INTRAMUSCULAR; INTRAVENOUS
Status: DISCONTINUED | OUTPATIENT
Start: 2022-08-01 | End: 2022-08-01 | Stop reason: HOSPADM

## 2022-08-01 RX ORDER — DIPHENHYDRAMINE HCL 25 MG
50 CAPSULE ORAL
Status: DISCONTINUED | OUTPATIENT
Start: 2022-08-01 | End: 2022-08-01 | Stop reason: HOSPADM

## 2022-08-01 RX ORDER — SODIUM CHLORIDE 450 MG/100ML
INJECTION, SOLUTION INTRAVENOUS
Status: DISCONTINUED | OUTPATIENT
Start: 2022-08-01 | End: 2022-08-01 | Stop reason: HOSPADM

## 2022-08-01 RX ORDER — SODIUM CHLORIDE 450 MG/100ML
INJECTION, SOLUTION INTRAVENOUS CONTINUOUS
Status: DISCONTINUED | OUTPATIENT
Start: 2022-08-01 | End: 2022-08-01 | Stop reason: HOSPADM

## 2022-08-01 RX ORDER — LIDOCAINE HYDROCHLORIDE 10 MG/ML
INJECTION INFILTRATION; PERINEURAL
Status: DISCONTINUED | OUTPATIENT
Start: 2022-08-01 | End: 2022-08-01 | Stop reason: HOSPADM

## 2022-08-01 RX ORDER — DIAZEPAM 5 MG/1
5 TABLET ORAL ONCE
Status: DISCONTINUED | OUTPATIENT
Start: 2022-08-01 | End: 2022-08-01 | Stop reason: HOSPADM

## 2022-08-01 RX ORDER — HEPARIN SOD,PORCINE/0.9 % NACL 1000/500ML
INTRAVENOUS SOLUTION INTRAVENOUS
Status: DISCONTINUED | OUTPATIENT
Start: 2022-08-01 | End: 2022-08-01 | Stop reason: HOSPADM

## 2022-08-01 RX ORDER — DIPHENHYDRAMINE HCL 25 MG
CAPSULE ORAL
Status: DISCONTINUED | OUTPATIENT
Start: 2022-08-01 | End: 2022-08-01 | Stop reason: HOSPADM

## 2022-08-01 RX ORDER — MIDAZOLAM HYDROCHLORIDE 1 MG/ML
INJECTION INTRAMUSCULAR; INTRAVENOUS
Status: DISCONTINUED | OUTPATIENT
Start: 2022-08-01 | End: 2022-08-01 | Stop reason: HOSPADM

## 2022-08-01 RX ORDER — ONDANSETRON 4 MG/1
8 TABLET, ORALLY DISINTEGRATING ORAL EVERY 8 HOURS PRN
Status: DISCONTINUED | OUTPATIENT
Start: 2022-08-01 | End: 2022-08-01 | Stop reason: HOSPADM

## 2022-08-01 NOTE — PLAN OF CARE
Problem: Adult Inpatient Plan of Care  Goal: Plan of Care Review  8/1/2022 1534 by Melissa Gonzalez RN  Outcome: Met  8/1/2022 0855 by Melissa Gonzalez RN  Outcome: Ongoing, Progressing  Goal: Patient-Specific Goal (Individualized)  8/1/2022 1534 by Melissa Gonzalez RN  Outcome: Met  8/1/2022 0855 by Melissa Gonzalez RN  Outcome: Ongoing, Progressing  Goal: Absence of Hospital-Acquired Illness or Injury  8/1/2022 1534 by Melissa Gonzalez RN  Outcome: Met  8/1/2022 0855 by Melissa Gonzalez RN  Outcome: Ongoing, Progressing  Goal: Optimal Comfort and Wellbeing  8/1/2022 1534 by Melissa Gonzalez RN  Outcome: Met  8/1/2022 0855 by Melissa Gonzalez RN  Outcome: Ongoing, Progressing  Goal: Readiness for Transition of Care  8/1/2022 1534 by Melissa Gonzalez RN  Outcome: Met  8/1/2022 0855 by Melissa Gonzalez RN  Outcome: Ongoing, Progressing     Problem: Arrhythmia/Dysrhythmia (Cardiac Catheterization)  Goal: Stable Heart Rate and Rhythm  8/1/2022 1534 by Melissa Gonzalez RN  Outcome: Met  8/1/2022 0855 by Melissa Gonzalez RN  Outcome: Ongoing, Progressing     Problem: Bleeding (Cardiac Catheterization)  Goal: Absence of Bleeding  8/1/2022 1534 by Melissa Gonzalez RN  Outcome: Met  8/1/2022 0855 by Melissa Gonzalez RN  Outcome: Ongoing, Progressing     Problem: Contrast-Induced Injury Risk (Cardiac Catheterization)  Goal: Absence of Contrast-Induced Injury  8/1/2022 1534 by Melissa Gonzalez RN  Outcome: Met  8/1/2022 0855 by Melissa Gonzalez RN  Outcome: Ongoing, Progressing     Problem: Embolism (Cardiac Catheterization)  Goal: Absence of Embolism Signs and Symptoms  8/1/2022 1534 by Melissa Gonzalez RN  Outcome: Met  8/1/2022 0855 by Melissa Gonzalez RN  Outcome: Ongoing, Progressing     Problem: Ongoing Anesthesia/Sedation Effects (Cardiac Catheterization)  Goal: Anesthesia/Sedation Recovery  8/1/2022 1534 by Melissa Gonzalez RN  Outcome: Met  8/1/2022 0855 by Melissa Gonzalez RN  Outcome: Ongoing, Progressing     Problem: Pain (Cardiac Catheterization)  Goal: Acceptable Pain  Control  8/1/2022 1534 by Melissa Gonzalez RN  Outcome: Met  8/1/2022 0855 by Melissa Gonzalez RN  Outcome: Ongoing, Progressing     Problem: Vascular Access Protection (Cardiac Catheterization)  Goal: Absence of Vascular Access Complication  8/1/2022 1534 by Melissa Gonzalez RN  Outcome: Met  8/1/2022 0855 by Melissa Gonzalez RN  Outcome: Ongoing, Progressing

## 2022-08-01 NOTE — Clinical Note
The left ventricle was injected. The injected rate was 14 mL/sec. The total injected volume was 45 mL.

## 2022-08-01 NOTE — NURSING
Discharge instructions reviewed with patient and spouse; and copy given to patient. Patient and spouse voiced understanding regarding:meds, appt., signs and symptoms to report to physician.                                                                                       CARE OF YOUR PUNCTURE SITE  *You or someone else, if you are unable, must inspect the site daily.  *DO NOT sit in a bathtub or pool of water for 5 days or until wound has healed.  *You may shower 24 hours after the procedure. Always use a clean washcloth to care for your incision and the area around it and a second washcloth for your body. Remove the bandaid before showering. Gently clean site using soap and water while standing in the shower. Dry thoroughly.  *After your shower, apply an antibacterial ointment to the site and cover with a bandaid the first day after your cath.  If you choose not to shower this day, you will still need to clean and redress your cath site.  *If you start bleeding at the site lay down while either you or someone else holds pressure over the site for 10 minutes without letting up. Seek help immediately if it does not stop bleeding.                                                                                                          ACTIVITY  *Fatigue is common for 1-2 days.  *You may resume normal activity in 2-3 days, letting pain be your guide.  *Limit lifting over 10 pounds for one week or until wound heals.   *Over the next few days, if you have to cough, laugh, or sneeze, hold pressure on the site with your hand while doing so.                                                                                          NORMAL OBSERVATIONS  *Soreness or tenderness that may last one week.  *Mild oozing from the site.  *Possible bruising that could last 2 weeks.  *Formation of a small lump (dime to quarter size) which may last up to 6 weeks.        CALL THE DOCTOR IMMEDIATELY OR GO TO THE EMERGENCY DEPARTMENT IF  YOU EXPERIENCE ANY OF THE FOLLOWING  *Significant bleeding that does not stop after 10 minutes of applying firm pressure.  *Increased swelling at access site or extremity.  *Unusual pain at access site:extremity or back pain.  *Signs of infection:redness, warmth to touch, drainage other than a little blood or pink tinged drainage on the bandaid, poorly healing puncture site, fever, or chills.

## 2022-08-01 NOTE — Clinical Note
The catheter was repositioned into the ostial LIMA graft. An angiography was performed of the graft. LIMA to LAD

## 2022-08-01 NOTE — PLAN OF CARE
Problem: Adult Inpatient Plan of Care  Goal: Plan of Care Review  Outcome: Ongoing, Progressing  Goal: Patient-Specific Goal (Individualized)  Outcome: Ongoing, Progressing  Goal: Absence of Hospital-Acquired Illness or Injury  Outcome: Ongoing, Progressing  Goal: Optimal Comfort and Wellbeing  Outcome: Ongoing, Progressing  Goal: Readiness for Transition of Care  Outcome: Ongoing, Progressing     Problem: Arrhythmia/Dysrhythmia (Cardiac Catheterization)  Goal: Stable Heart Rate and Rhythm  Outcome: Ongoing, Progressing     Problem: Bleeding (Cardiac Catheterization)  Goal: Absence of Bleeding  Outcome: Ongoing, Progressing     Problem: Contrast-Induced Injury Risk (Cardiac Catheterization)  Goal: Absence of Contrast-Induced Injury  Outcome: Ongoing, Progressing     Problem: Embolism (Cardiac Catheterization)  Goal: Absence of Embolism Signs and Symptoms  Outcome: Ongoing, Progressing     Problem: Ongoing Anesthesia/Sedation Effects (Cardiac Catheterization)  Goal: Anesthesia/Sedation Recovery  Outcome: Ongoing, Progressing     Problem: Pain (Cardiac Catheterization)  Goal: Acceptable Pain Control  Outcome: Ongoing, Progressing     Problem: Vascular Access Protection (Cardiac Catheterization)  Goal: Absence of Vascular Access Complication  Outcome: Ongoing, Progressing   Oriented to room/unit - reviewed POC

## 2022-08-01 NOTE — Clinical Note
165 ml of contrast were injected throughout the case. 135 mL of contrast was the total wasted during the case. 300 mL was the total amount used during the case.

## 2022-08-01 NOTE — DISCHARGE INSTRUCTIONS
CARE OF YOUR PUNCTURE SITE  *You or someone else, if you are unable, must inspect the site daily.  *DO NOT sit in a bathtub or pool of water for 5 days or until wound has healed.  *You may shower 24 hours after the procedure. Always use a clean washcloth to care for your incision and the area around it and a second washcloth for your body. Remove the bandaid before showering. Gently clean site using soap and water while standing in the shower. Dry thoroughly.  *After your shower, apply an antibacterial ointment to the site and cover with a bandaid the first day after your cath.  If you choose not to shower this day, you will still need to clean and redress your cath site.  *If you start bleeding at the site lay down while either you or someone else holds pressure over the site for 10 minutes without letting up. Seek help immediately if it does not stop bleeding.                                                                                                          ACTIVITY  *Fatigue is common for 1-2 days.  *You may resume normal activity in 2-3 days, letting pain be your guide.  *Limit lifting over 10 pounds for one week or until wound heals.   *Over the next few days, if you have to cough, laugh, or sneeze, hold pressure on the site with your hand while doing so.                                                                                          NORMAL OBSERVATIONS  *Soreness or tenderness that may last one week.  *Mild oozing from the site.  *Possible bruising that could last 2 weeks.  *Formation of a small lump (dime to quarter size) which may last up to 6 weeks.        CALL THE DOCTOR IMMEDIATELY OR GO TO THE EMERGENCY DEPARTMENT IF YOU EXPERIENCE ANY OF THE FOLLOWING  *Significant bleeding that does not stop after 10 minutes of applying firm pressure.  *Increased swelling at access site or extremity.  *Unusual pain at  access site:extremity or back pain.  *Signs of infection:redness, warmth to touch, drainage other than a little blood or pink tinged drainage on the bandaid, poorly healing puncture site, fever, or chills.

## 2022-08-01 NOTE — Clinical Note
The catheter was inserted over the wire into the left ventricle. Hemodynamics were performed.  and Pullback was recorded.  An angiography was performed of the LV.  This is not done by nursing staff- refer to my chart desk

## 2022-08-02 ENCOUNTER — TELEPHONE (OUTPATIENT)
Dept: MEDSURG UNIT | Facility: HOSPITAL | Age: 67
End: 2022-08-02
Payer: MEDICARE

## 2022-08-03 DIAGNOSIS — R17 ELEVATED BILIRUBIN: Primary | ICD-10-CM

## 2022-08-04 ENCOUNTER — TELEPHONE (OUTPATIENT)
Dept: MEDSURG UNIT | Facility: HOSPITAL | Age: 67
End: 2022-08-04
Payer: MEDICARE

## 2022-08-04 NOTE — TELEPHONE ENCOUNTER
Patient states is doing well, cath site clean dry and intact, no signs of infection noted. No complaints or concern voiced.

## 2022-08-12 ENCOUNTER — HOSPITAL ENCOUNTER (OUTPATIENT)
Dept: RADIOLOGY | Facility: HOSPITAL | Age: 67
Discharge: HOME | End: 2022-08-12
Attending: FAMILY MEDICINE
Payer: MEDICARE

## 2022-08-12 DIAGNOSIS — R17 ELEVATED BILIRUBIN: ICD-10-CM

## 2022-08-12 PROCEDURE — 76700 US EXAM ABDOM COMPLETE: CPT | Mod: TC

## 2022-08-12 PROCEDURE — 76700 US EXAM ABDOM COMPLETE: CPT | Mod: 26,,, | Performed by: STUDENT IN AN ORGANIZED HEALTH CARE EDUCATION/TRAINING PROGRAM

## 2022-08-12 PROCEDURE — 76700 US ABDOMEN COMPLETE: ICD-10-PCS | Mod: 26,,, | Performed by: STUDENT IN AN ORGANIZED HEALTH CARE EDUCATION/TRAINING PROGRAM

## 2022-08-15 ENCOUNTER — OFFICE VISIT (OUTPATIENT)
Dept: PRIMARY CARE CLINIC | Facility: CLINIC | Age: 67
End: 2022-08-15
Payer: MEDICARE

## 2022-08-15 VITALS
SYSTOLIC BLOOD PRESSURE: 138 MMHG | WEIGHT: 239 LBS | OXYGEN SATURATION: 96 % | RESPIRATION RATE: 18 BRPM | BODY MASS INDEX: 32.37 KG/M2 | DIASTOLIC BLOOD PRESSURE: 88 MMHG | HEIGHT: 72 IN | HEART RATE: 64 BPM

## 2022-08-15 DIAGNOSIS — G89.29 CHRONIC LOW BACK PAIN, UNSPECIFIED BACK PAIN LATERALITY, UNSPECIFIED WHETHER SCIATICA PRESENT: ICD-10-CM

## 2022-08-15 DIAGNOSIS — I25.10 CORONARY ARTERY DISEASE INVOLVING NATIVE CORONARY ARTERY OF NATIVE HEART WITHOUT ANGINA PECTORIS: ICD-10-CM

## 2022-08-15 DIAGNOSIS — I48.0 PAROXYSMAL ATRIAL FIBRILLATION: ICD-10-CM

## 2022-08-15 DIAGNOSIS — K76.89 NODULAR HYPERPLASIA OF LIVER: Primary | ICD-10-CM

## 2022-08-15 DIAGNOSIS — M54.50 CHRONIC LOW BACK PAIN, UNSPECIFIED BACK PAIN LATERALITY, UNSPECIFIED WHETHER SCIATICA PRESENT: ICD-10-CM

## 2022-08-15 DIAGNOSIS — G47.33 OBSTRUCTIVE SLEEP APNEA SYNDROME: ICD-10-CM

## 2022-08-15 PROCEDURE — 99214 PR OFFICE/OUTPT VISIT, EST, LEVL IV, 30-39 MIN: ICD-10-PCS | Mod: ,,, | Performed by: FAMILY MEDICINE

## 2022-08-15 PROCEDURE — 99214 OFFICE O/P EST MOD 30 MIN: CPT | Mod: ,,, | Performed by: FAMILY MEDICINE

## 2022-08-15 NOTE — PROGRESS NOTES
Subjective:      Patient ID: Shreyas Man is a 67 y.o. male.    Chief Complaint: Follow-up and US results    Shreyas Man a 67 y.o. male presents for follow up on all regular problems which are reviewed and discussed.     Problem List Items Addressed This Visit        Cardiac/Vascular    Atrial fibrillation    Coronary artery disease involving native coronary artery of native heart    Overview     S/p CABG 8/5/2010 Dr. Krystal AGARWAL to the LAD and LIMA to the PERLA Y graft with distal anastomosis to the diag; left radial artery to the PDA              Orthopedic    Low back pain       Other    Sleep apnea      Other Visit Diagnoses     Nodular hyperplasia of liver    -  Primary    Relevant Orders    CT Abdomen Pelvis W Wo Contrast          Past Medical History:  Past Medical History:   Diagnosis Date    Anemia     Arthritis     Atrial fibrillation     Cancer     Coronary artery disease     Encounter for blood transfusion     GERD (gastroesophageal reflux disease)     Hyperlipidemia     Hypertension     Sleep apnea     Thyroid disease      Past Surgical History:   Procedure Laterality Date    CARDIAC CATHETERIZATION      CARDIAC VALVE SURGERY      CORONARY ARTERY BYPASS GRAFT      LEFT HEART CATHETERIZATION Left 8/1/2022    Procedure: Left heart cath;  Surgeon: Tristan Pham MD;  Location[facility] CATH LAB;  Service: Cardiology;  Laterality: Left;  NO LV GRAM. HE HAS A BIOPROSTHETIC AO VALVE.    SPINE SURGERY       Review of patient's allergies indicates:   Allergen Reactions    Levaquin [levofloxacin]     Toradol [ketorolac]     Wasp sting [allergen ext-venom-honey bee]     Wasp venom Itching and Swelling    Zyrtec [cetirizine]     Tramadol hcl Itching     Current Outpatient Medications on File Prior to Visit   Medication Sig Dispense Refill    amLODIPine (NORVASC) 10 MG tablet Take 1 tablet (10 mg total) by mouth once daily. 90 tablet 3    atorvastatin (LIPITOR) 80 MG  tablet TAKE 1 TABLET DAILY 90 tablet 3    EScitalopram oxalate (LEXAPRO) 10 MG tablet Take 1 tablet (10 mg total) by mouth once daily. 30 tablet 4    HYDROcodone-acetaminophen (NORCO)  mg per tablet Take 1 tablet by mouth every 6 (six) hours as needed for Pain. 120 tablet 0    levothyroxine (SYNTHROID) 75 MCG tablet Take 1 tablet (75 mcg total) by mouth before breakfast. 90 tablet 3    lisinopriL 10 MG tablet Take 1 tablet (10 mg total) by mouth 2 (two) times daily. 180 tablet 1    methocarbamoL (ROBAXIN) 750 MG Tab Take two tablets 4 times a day as needed. 240 tablet 1    PACERONE 200 mg Tab TAKE 1 TABLET DAILY 90 tablet 3    ALPRAZolam (XANAX) 0.25 MG tablet Take 1 tablet (0.25 mg total) by mouth daily as needed for Anxiety. 90 tablet 1    [DISCONTINUED] predniSONE (DELTASONE) 20 MG tablet Take 2 tablets (40 mg total) by mouth once daily. 10 tablet 0     No current facility-administered medications on file prior to visit.     Social History     Socioeconomic History    Marital status:    Tobacco Use    Smoking status: Former Smoker     Packs/day: 1.00     Quit date: 2010     Years since quittin.2    Smokeless tobacco: Never Used    Tobacco comment: quit 10+ years ago   Substance and Sexual Activity    Alcohol use: Never    Drug use: Never     Family History   Problem Relation Age of Onset    Heart attack Mother     Heart attack Father     Heart disease Father        Review of Systems   Constitutional: Negative.    HENT: Negative for congestion, ear pain, nosebleeds and trouble swallowing.    Eyes: Negative for pain and itching.   Respiratory: Negative for chest tightness.    Cardiovascular: Negative for chest pain.   Gastrointestinal: Negative for abdominal distention.   Endocrine: Negative for cold intolerance and heat intolerance.   Genitourinary: Negative for difficulty urinating.   Musculoskeletal: Negative for arthralgias.   Neurological: Negative for dizziness.        Objective:     /88 (BP Location: Right arm, Patient Position: Sitting, BP Method: Large (Manual))   Pulse 64   Resp 18   Ht 6' (1.829 m)   Wt 108.4 kg (239 lb)   SpO2 96%   BMI 32.41 kg/m²     Physical Exam  Constitutional:       Appearance: Normal appearance. He is obese.   HENT:      Head: Normocephalic and atraumatic.      Right Ear: External ear normal.      Left Ear: External ear normal.      Nose: Nose normal.      Mouth/Throat:      Mouth: Mucous membranes are moist.      Pharynx: Oropharynx is clear.   Eyes:      Pupils: Pupils are equal, round, and reactive to light.   Cardiovascular:      Rate and Rhythm: Normal rate and regular rhythm.      Heart sounds: Normal heart sounds.   Pulmonary:      Effort: Pulmonary effort is normal.      Breath sounds: Normal breath sounds.   Abdominal:      Palpations: Abdomen is soft.   Musculoskeletal:         General: Normal range of motion.      Cervical back: Normal range of motion and neck supple.   Skin:     General: Skin is warm and dry.   Neurological:      General: No focal deficit present.      Mental Status: He is alert.   Psychiatric:         Mood and Affect: Mood normal.         Behavior: Behavior normal.         Thought Content: Thought content normal.         Judgment: Judgment normal.       Assessment:     1. Nodular hyperplasia of liver    2. Obstructive sleep apnea syndrome    3. Chronic low back pain, unspecified back pain laterality, unspecified whether sciatica present    4. Coronary artery disease involving native coronary artery of native heart without angina pectoris    5. Paroxysmal atrial fibrillation        Plan:     Problem List Items Addressed This Visit        Cardiac/Vascular    Atrial fibrillation    Coronary artery disease involving native coronary artery of native heart       Orthopedic    Low back pain       Other    Sleep apnea      Other Visit Diagnoses     Nodular hyperplasia of liver    -  Primary    Relevant Orders     CT Abdomen Pelvis W Wo Contrast        No follow-ups on file.  After ct  Ed. given    I am having Shreyas PETERS Magda maintain his amLODIPine, atorvastatin, PACERONE, lisinopriL, levothyroxine, HYDROcodone-acetaminophen, methocarbamoL, ALPRAZolam, and EScitalopram oxalate.    Shreyas was seen today for follow-up and us results.    Diagnoses and all orders for this visit:    Nodular hyperplasia of liver  -     CT Abdomen Pelvis W Wo Contrast; Future    Obstructive sleep apnea syndrome    Chronic low back pain, unspecified back pain laterality, unspecified whether sciatica present    Coronary artery disease involving native coronary artery of native heart without angina pectoris    Paroxysmal atrial fibrillation         [unfilled]  Orders Placed This Encounter   Procedures    CT Abdomen Pelvis W Wo Contrast     Standing Status:   Future     Standing Expiration Date:   8/15/2023     Order Specific Question:   Is the patient allergic to iodine or contrast?     Answer:   No     Order Specific Question:   Is the patient on ANY Metformin drug such as Glucophage/Glucovance?           Should be off drug 48 hours after contrast. Check renal function before restart.     Answer:   No     Order Specific Question:   History of Kidney Disease - including: decreased kidney function, dialysis, kidney transplay, single kidney, kidney cancer, kidney surgery?     Answer:   None     Order Specific Question:   Diabetes?     Answer:   No     Order Specific Question:   May the Radiologist modify the order per protocol to meet the clinical needs of the patient?     Answer:   No     Order Specific Question:   Reason:     Answer:   nodular liver     Order Specific Question:   Oral/Rectal Contrast instructions:     Answer:   Routine Oral Contrast     Order Specific Question:   Special CT ABD Protocol Request?     Answer:   Routine

## 2022-08-23 ENCOUNTER — HOSPITAL ENCOUNTER (OUTPATIENT)
Dept: RADIOLOGY | Facility: HOSPITAL | Age: 67
Discharge: HOME | End: 2022-08-23
Attending: FAMILY MEDICINE
Payer: MEDICARE

## 2022-08-23 ENCOUNTER — OFFICE VISIT (OUTPATIENT)
Dept: PRIMARY CARE CLINIC | Facility: CLINIC | Age: 67
End: 2022-08-23
Payer: MEDICARE

## 2022-08-23 VITALS
DIASTOLIC BLOOD PRESSURE: 92 MMHG | HEART RATE: 80 BPM | OXYGEN SATURATION: 98 % | RESPIRATION RATE: 18 BRPM | SYSTOLIC BLOOD PRESSURE: 160 MMHG | BODY MASS INDEX: 31.69 KG/M2 | HEIGHT: 72 IN | WEIGHT: 234 LBS

## 2022-08-23 DIAGNOSIS — I25.10 CORONARY ARTERY DISEASE INVOLVING NATIVE CORONARY ARTERY OF NATIVE HEART WITHOUT ANGINA PECTORIS: ICD-10-CM

## 2022-08-23 DIAGNOSIS — I10 PRIMARY HYPERTENSION: Primary | ICD-10-CM

## 2022-08-23 DIAGNOSIS — Z95.2 HX OF AORTIC VALVE REPLACEMENT: ICD-10-CM

## 2022-08-23 DIAGNOSIS — K76.89 NODULAR HYPERPLASIA OF LIVER: ICD-10-CM

## 2022-08-23 DIAGNOSIS — I35.0 NONRHEUMATIC AORTIC VALVE STENOSIS: ICD-10-CM

## 2022-08-23 DIAGNOSIS — I48.0 PAROXYSMAL ATRIAL FIBRILLATION: ICD-10-CM

## 2022-08-23 PROCEDURE — 74178 CT ABD&PLV WO CNTR FLWD CNTR: CPT | Mod: TC

## 2022-08-23 PROCEDURE — 25500020 PHARM REV CODE 255: Performed by: FAMILY MEDICINE

## 2022-08-23 PROCEDURE — 74178 CT ABD&PLV WO CNTR FLWD CNTR: CPT | Mod: 26,,, | Performed by: RADIOLOGY

## 2022-08-23 PROCEDURE — 99214 OFFICE O/P EST MOD 30 MIN: CPT | Mod: ,,, | Performed by: FAMILY MEDICINE

## 2022-08-23 PROCEDURE — 74178 CT ABDOMEN PELVIS W WO CONTRAST: ICD-10-PCS | Mod: 26,,, | Performed by: RADIOLOGY

## 2022-08-23 PROCEDURE — 99214 PR OFFICE/OUTPT VISIT, EST, LEVL IV, 30-39 MIN: ICD-10-PCS | Mod: ,,, | Performed by: FAMILY MEDICINE

## 2022-08-23 RX ADMIN — IOPAMIDOL 100 ML: 755 INJECTION, SOLUTION INTRAVENOUS at 08:08

## 2022-08-23 NOTE — PROGRESS NOTES
"Subjective:      Patient ID: Shreyas Man is a 67 y.o. male.    Chief Complaint: Follow-up and CT results    Shreyas Man a 67 y.o. male presents for follow up on all regular problems which are reviewed and discussed.   Had abn lab-ultrasound was abn.-ct scan was not discussed all with pt.  Problem List Items Addressed This Visit        Cardiac/Vascular    Nonrheumatic aortic valve stenosis    Overview     AVR 8/5/2010 "tissue valve"           Hx of aortic valve replacement    Hypertension - Primary    Atrial fibrillation    Coronary artery disease involving native coronary artery of native heart    Overview     S/p CABG 8/5/2010 Dr. Krystal AGARWAL to the LAD and LIMA to the PERLA Y graft with distal anastomosis to the diag; left radial artery to the PDA                 Past Medical History:  Past Medical History:   Diagnosis Date    Anemia     Arthritis     Atrial fibrillation     Cancer     Coronary artery disease     Encounter for blood transfusion     GERD (gastroesophageal reflux disease)     Hyperlipidemia     Hypertension     Sleep apnea     Thyroid disease      Past Surgical History:   Procedure Laterality Date    CARDIAC CATHETERIZATION      CARDIAC VALVE SURGERY      CORONARY ARTERY BYPASS GRAFT      LEFT HEART CATHETERIZATION Left 8/1/2022    Procedure: Left heart cath;  Surgeon: Tristan Pham MD;  Location: [facility] CATH LAB;  Service: Cardiology;  Laterality: Left;  NO LV GRAM. HE HAS A BIOPROSTHETIC AO VALVE.    SPINE SURGERY       Review of patient's allergies indicates:   Allergen Reactions    Levaquin [levofloxacin]     Toradol [ketorolac]     Wasp sting [allergen ext-venom-honey bee]     Wasp venom Itching and Swelling    Zyrtec [cetirizine]     Tramadol hcl Itching     Current Outpatient Medications on File Prior to Visit   Medication Sig Dispense Refill    amLODIPine (NORVASC) 10 MG tablet Take 1 tablet (10 mg total) by mouth once daily. 90 tablet 3    " atorvastatin (LIPITOR) 80 MG tablet TAKE 1 TABLET DAILY 90 tablet 3    EScitalopram oxalate (LEXAPRO) 10 MG tablet Take 1 tablet (10 mg total) by mouth once daily. 30 tablet 4    HYDROcodone-acetaminophen (NORCO)  mg per tablet Take 1 tablet by mouth every 6 (six) hours as needed for Pain. 120 tablet 0    levothyroxine (SYNTHROID) 75 MCG tablet Take 1 tablet (75 mcg total) by mouth before breakfast. 90 tablet 3    lisinopriL 10 MG tablet Take 1 tablet (10 mg total) by mouth 2 (two) times daily. 180 tablet 1    methocarbamoL (ROBAXIN) 750 MG Tab Take two tablets 4 times a day as needed. 240 tablet 1    PACERONE 200 mg Tab TAKE 1 TABLET DAILY 90 tablet 3    ALPRAZolam (XANAX) 0.25 MG tablet Take 1 tablet (0.25 mg total) by mouth daily as needed for Anxiety. 90 tablet 1     Current Facility-Administered Medications on File Prior to Visit   Medication Dose Route Frequency Provider Last Rate Last Admin    [COMPLETED] barium sulfate (READI-CAT) suspension 225 mL  225 mL Oral Once Miguel Angel Gregory III, DO   225 mL at 22 0945    [COMPLETED] iopamidoL (ISOVUE-370) injection 100 mL  100 mL Intravenous ONCE PRN Miguel Angel Gregory III, DO   100 mL at 22 0833     Social History     Socioeconomic History    Marital status:    Tobacco Use    Smoking status: Former Smoker     Packs/day: 1.00     Quit date: 2010     Years since quittin.2    Smokeless tobacco: Never Used    Tobacco comment: quit 10+ years ago   Substance and Sexual Activity    Alcohol use: Never    Drug use: Never     Family History   Problem Relation Age of Onset    Heart attack Mother     Heart attack Father     Heart disease Father        Review of Systems   Constitutional: Negative.    HENT: Negative for congestion, ear pain, nosebleeds and trouble swallowing.    Eyes: Negative for pain and itching.   Respiratory: Negative for chest tightness.    Cardiovascular: Negative for chest pain.   Gastrointestinal:  Negative for abdominal distention.   Endocrine: Negative for cold intolerance and heat intolerance.   Genitourinary: Negative for difficulty urinating.   Musculoskeletal: Negative for arthralgias.   Neurological: Negative for dizziness.       Objective:     BP (!) 160/92 (BP Location: Left arm, Patient Position: Sitting, BP Method: Large (Manual))   Pulse 80   Resp 18   Ht 6' (1.829 m)   Wt 106.1 kg (234 lb)   SpO2 98%   BMI 31.74 kg/m²     Physical Exam  Constitutional:       Appearance: Normal appearance. He is obese.   HENT:      Head: Normocephalic and atraumatic.      Right Ear: External ear normal.      Left Ear: External ear normal.      Nose: Nose normal.      Mouth/Throat:      Mouth: Mucous membranes are moist.      Pharynx: Oropharynx is clear.   Eyes:      Pupils: Pupils are equal, round, and reactive to light.   Cardiovascular:      Rate and Rhythm: Normal rate and regular rhythm.      Heart sounds: Normal heart sounds.   Pulmonary:      Effort: Pulmonary effort is normal.      Breath sounds: Normal breath sounds.   Abdominal:      Palpations: Abdomen is soft.   Musculoskeletal:         General: Normal range of motion.      Cervical back: Normal range of motion and neck supple.   Skin:     General: Skin is warm and dry.   Neurological:      General: No focal deficit present.      Mental Status: He is alert.   Psychiatric:         Mood and Affect: Mood normal.         Behavior: Behavior normal.         Thought Content: Thought content normal.         Judgment: Judgment normal.       Assessment:     1. Primary hypertension    2. Nonrheumatic aortic valve stenosis    3. Hx of aortic valve replacement    4. Paroxysmal atrial fibrillation    5. Coronary artery disease involving native coronary artery of native heart without angina pectoris        Plan:     Problem List Items Addressed This Visit        Cardiac/Vascular    Nonrheumatic aortic valve stenosis    Hx of aortic valve replacement     Hypertension - Primary    Atrial fibrillation    Coronary artery disease involving native coronary artery of native heart        No follow-ups on file.  6m fu  Ed. given    I am having Shreyas PETERS Magda maintain his amLODIPine, atorvastatin, PACERONE, lisinopriL, levothyroxine, HYDROcodone-acetaminophen, methocarbamoL, ALPRAZolam, and EScitalopram oxalate.    Shreyas was seen today for follow-up and ct results.    Diagnoses and all orders for this visit:    Primary hypertension    Nonrheumatic aortic valve stenosis    Hx of aortic valve replacement    Paroxysmal atrial fibrillation    Coronary artery disease involving native coronary artery of native heart without angina pectoris         [unfilled]  No orders of the defined types were placed in this encounter.

## 2022-08-31 PROCEDURE — 88305 TISSUE EXAM BY PATHOLOGIST: CPT | Mod: SUR

## 2022-08-31 PROCEDURE — 88305 PATHOLOGY, DERMATOLOGY: ICD-10-PCS | Mod: 26,,, | Performed by: PATHOLOGY

## 2022-08-31 PROCEDURE — 88305 TISSUE EXAM BY PATHOLOGIST: CPT | Mod: 26,,, | Performed by: PATHOLOGY

## 2022-09-01 ENCOUNTER — LAB REQUISITION (OUTPATIENT)
Dept: LAB | Facility: HOSPITAL | Age: 67
End: 2022-09-01
Attending: FAMILY MEDICINE
Payer: MEDICARE

## 2022-09-01 ENCOUNTER — OFFICE VISIT (OUTPATIENT)
Dept: CARDIOLOGY | Facility: CLINIC | Age: 67
End: 2022-09-01
Payer: MEDICARE

## 2022-09-01 VITALS
DIASTOLIC BLOOD PRESSURE: 88 MMHG | WEIGHT: 236 LBS | SYSTOLIC BLOOD PRESSURE: 130 MMHG | HEART RATE: 83 BPM | OXYGEN SATURATION: 97 % | HEIGHT: 72 IN | BODY MASS INDEX: 31.97 KG/M2

## 2022-09-01 DIAGNOSIS — D49.2 NEOPLASM OF UNSPECIFIED BEHAVIOR OF BONE, SOFT TISSUE, AND SKIN: ICD-10-CM

## 2022-09-01 DIAGNOSIS — I25.83 CORONARY ARTERY DISEASE DUE TO LIPID RICH PLAQUE: Primary | ICD-10-CM

## 2022-09-01 DIAGNOSIS — I25.10 CORONARY ARTERY DISEASE DUE TO LIPID RICH PLAQUE: Primary | ICD-10-CM

## 2022-09-01 PROCEDURE — 99213 OFFICE O/P EST LOW 20 MIN: CPT | Mod: PBBFAC | Performed by: NURSE PRACTITIONER

## 2022-09-01 PROCEDURE — 99214 OFFICE O/P EST MOD 30 MIN: CPT | Mod: S$PBB,,, | Performed by: NURSE PRACTITIONER

## 2022-09-01 PROCEDURE — 99214 PR OFFICE/OUTPT VISIT, EST, LEVL IV, 30-39 MIN: ICD-10-PCS | Mod: S$PBB,,, | Performed by: NURSE PRACTITIONER

## 2022-09-01 RX ORDER — ASPIRIN 81 MG/1
81 TABLET ORAL DAILY
Qty: 90 TABLET | Refills: 3 | Status: SHIPPED | OUTPATIENT
Start: 2022-09-01 | End: 2023-11-13 | Stop reason: SDUPTHER

## 2022-09-01 RX ORDER — HYDROCODONE BITARTRATE AND ACETAMINOPHEN 10; 325 MG/1; MG/1
1 TABLET ORAL EVERY 6 HOURS PRN
Qty: 120 TABLET | Refills: 0 | Status: SHIPPED | OUTPATIENT
Start: 2022-09-01 | End: 2022-11-01 | Stop reason: SDUPTHER

## 2022-09-01 NOTE — PROGRESS NOTES
[facility] note        DATE OF SERVICE: 2022       PCP: Miguel Angel Gregory III, DO      CHIEF COMPLAINT:   Chief Complaint   Patient presents with    Hospital Follow Up     Patient denies any cardiac complaints today.        HISTORY OF PRESENT ILLNESS:  Shreyas Man is a 67 y.o. male with a PMH of   Past Medical History:   Diagnosis Date    Anemia     Arthritis     Atrial fibrillation     Cancer     Coronary artery disease     Encounter for blood transfusion     GERD (gastroesophageal reflux disease)     Hyperlipidemia     Hypertension     Sleep apnea     Thyroid disease      who presents for HD follow up from [facility] which demonstrated patent grafts and normal EF.   Chief Complaint   Patient presents with    Hospital Follow Up     Patient denies any cardiac complaints today.            PAST MEDICAL HISTORY:  Past Medical History:   Diagnosis Date    Anemia     Arthritis     Atrial fibrillation     Cancer     Coronary artery disease     Encounter for blood transfusion     GERD (gastroesophageal reflux disease)     Hyperlipidemia     Hypertension     Sleep apnea     Thyroid disease        PAST SURGICAL HISTORY:  Past Surgical History:   Procedure Laterality Date    CARDIAC CATHETERIZATION      CARDIAC VALVE SURGERY      CORONARY ARTERY BYPASS GRAFT      LEFT HEART CATHETERIZATION Left 2022    Procedure: Left heart cath;  Surgeon: Tristan Pham MD;  Location: [facility] CATH LAB;  Service: Cardiology;  Laterality: Left;  NO LV GRAM. HE HAS A BIOPROSTHETIC AO VALVE.    SPINE SURGERY         SOCIAL HISTORY:  Social History     Socioeconomic History    Marital status:    Tobacco Use    Smoking status: Former     Packs/day: 1.00     Types: Cigarettes     Quit date: 2010     Years since quittin.2    Smokeless tobacco: Never    Tobacco comments:     quit 10+ years ago   Substance and Sexual Activity    Alcohol use: Never    Drug use: Never       FAMILY HISTORY:  Family History    Problem Relation Age of Onset    Heart attack Mother     Heart attack Father     Heart disease Father          ALLERGIES:  Review of patient's allergies indicates:   Allergen Reactions    Levaquin [levofloxacin]     Toradol [ketorolac]     Wasp sting [allergen ext-venom-honey bee]     Wasp venom Itching and Swelling    Zyrtec [cetirizine]     Tramadol hcl Itching        MEDICATIONS:    Current Outpatient Medications:     ALPRAZolam (XANAX) 0.25 MG tablet, Take 1 tablet (0.25 mg total) by mouth daily as needed for Anxiety., Disp: 90 tablet, Rfl: 1    amLODIPine (NORVASC) 10 MG tablet, Take 1 tablet (10 mg total) by mouth once daily., Disp: 90 tablet, Rfl: 3    aspirin (ECOTRIN) 81 MG EC tablet, Take 1 tablet (81 mg total) by mouth once daily., Disp: 90 tablet, Rfl: 3    atorvastatin (LIPITOR) 80 MG tablet, TAKE 1 TABLET DAILY, Disp: 90 tablet, Rfl: 3    EScitalopram oxalate (LEXAPRO) 10 MG tablet, Take 1 tablet (10 mg total) by mouth once daily., Disp: 30 tablet, Rfl: 4    HYDROcodone-acetaminophen (NORCO)  mg per tablet, Take 1 tablet by mouth every 6 (six) hours as needed for Pain., Disp: 120 tablet, Rfl: 0    levothyroxine (SYNTHROID) 75 MCG tablet, Take 1 tablet (75 mcg total) by mouth before breakfast., Disp: 90 tablet, Rfl: 3    lisinopriL 10 MG tablet, Take 1 tablet (10 mg total) by mouth 2 (two) times daily., Disp: 180 tablet, Rfl: 1    methocarbamoL (ROBAXIN) 750 MG Tab, Take two tablets 4 times a day as needed., Disp: 240 tablet, Rfl: 1    PACERONE 200 mg Tab, TAKE 1 TABLET DAILY, Disp: 90 tablet, Rfl: 3  Medications have been reviewed but not reconciled. He did not bring his meds today.    PHYSICAL EXAM:  /88 (BP Location: Left arm, Patient Position: Sitting)   Pulse 83   Ht 6' (1.829 m)   Wt 107 kg (236 lb)   SpO2 97%   BMI 32.01 kg/m²   Wt Readings from Last 3 Encounters:   09/01/22 107 kg (236 lb)   08/23/22 106.1 kg (234 lb)   08/15/22 108.4 kg (239 lb)      Body mass index is 32.01  kg/m².    Physical Exam  Vitals and nursing note reviewed.   Constitutional:       Appearance: Normal appearance.   HENT:      Head: Normocephalic and atraumatic.   Eyes:      Pupils: Pupils are equal, round, and reactive to light.   Neck:      Vascular: No carotid bruit.   Cardiovascular:      Rate and Rhythm: Normal rate and regular rhythm.      Pulses: Normal pulses.      Heart sounds: Normal heart sounds.   Pulmonary:      Effort: Pulmonary effort is normal.      Breath sounds: Normal breath sounds.   Abdominal:      General: Bowel sounds are normal.      Palpations: Abdomen is soft.   Musculoskeletal:      Cervical back: Neck supple.      Right lower leg: No edema.      Left lower leg: No edema.   Skin:     General: Skin is warm and dry.      Capillary Refill: Capillary refill takes less than 2 seconds.   Neurological:      General: No focal deficit present.      Mental Status: He is alert and oriented to person, place, and time.   Psychiatric:         Mood and Affect: Mood normal.         Behavior: Behavior normal.       LABS REVIEWED:  Lab Results   Component Value Date    WBC 6.59 07/28/2022    RBC 5.11 07/28/2022    HGB 16.6 07/28/2022    HCT 47.9 07/28/2022    MCV 93.7 07/28/2022    MCH 32.5 (H) 07/28/2022    MCHC 34.7 07/28/2022    RDW 13.2 07/28/2022     07/28/2022    MPV 9.8 07/28/2022    NRBC 0.0 07/28/2022     Lab Results   Component Value Date     07/28/2022    K 4.6 07/28/2022     07/28/2022    CO2 26 07/28/2022    BUN 19 (H) 07/28/2022     Lab Results   Component Value Date    AST 27 07/28/2022    ALT 54 07/28/2022     Lab Results   Component Value Date    GLU 91 07/28/2022    HGBA1C 5.0 10/19/2021     Lab Results   Component Value Date    CHOL 154 10/19/2021    HDL 53 10/19/2021    TRIG 104 10/19/2021    CHOLHDL 2.9 10/19/2021     CARDIAC STUDIES REVIEWED:.   Stress test  Results for orders placed during the hospital encounter of 05/11/22    Exercise Stress -  EKG    Interpretation Summary    The EKG portion of this study is positive for ischemia.    The patient reported no chest pain during the stress test.    During stress, atrial fibrillation is noted.    Conclusion:  Abnormal EST suggestive of coronary ischemia.     echocardiogram  Results for orders placed during the hospital encounter of 04/13/22    Echo Saline Bubble? No    Interpretation Summary  · The left ventricle is normal in size with mild concentric hypertrophy and  · The estimated ejection fraction is 65%.  · Left ventricular diastolic dysfunction.  · Bioprosthetic aortic valve, well seated, RADHA 1.76 cm2; PPG/MPG 35/14 mmHg.  · Mild mitral regurgitation.  · Mild left atrial enlargement.  · Normal right ventricular size.  · Mild tricuspid regurgitation.  · Mild pulmonic regurgitation.     Heart cath  Results for orders placed during the hospital encounter of 08/01/22    Cardiac catheterization    Conclusion  · The left ventricular systolic function was normal.  · The left ventricular end diastolic pressure was normal.  · The pre-procedure left ventricular end diastolic pressure was 20.  · The ejection fraction was calculated to be 60%.  · The left ventricular ejection fraction was grossly normal.  · There was no mitral valve regurgitation.  · The Dist Cx lesion was 50% stenosed.  · The Prox LAD lesion was 80% stenosed.  · The estimated blood loss was none.  · There was three vessel coronary artery disease.  · Zenia to OM is atrophiied with no sig disease in the native om.    The procedure log was documented by Documenter: RT Lena and verified by Tristan Pham MD.    Date: 8/1/2022  Time: 12:24 PM           ASSESSMENT:   Patient Active Problem List   Diagnosis    Nonrheumatic aortic valve stenosis    Hx of aortic valve replacement    Hypertension    Hyperlipidemia    Atrial fibrillation    Sleep apnea    Coronary artery disease involving native coronary artery of native heart    Low back pain             Problem List Items Addressed This Visit    None  Visit Diagnoses       Coronary artery disease due to lipid rich plaque    -  Primary    Relevant Medications    aspirin (ECOTRIN) 81 MG EC tablet             PLAN:     RTC: 6 months

## 2022-09-02 LAB
ESTROGEN SERPL-MCNC: NORMAL PG/ML
INSULIN SERPL-ACNC: NORMAL U[IU]/ML
LAB AP GROSS DESCRIPTION: NORMAL
LAB AP LABORATORY NOTES: NORMAL
LAB AP SPEC A DDX: NORMAL
LAB AP SPEC A MORPHOLOGY: NORMAL
LAB AP SPEC A PROCEDURE: NORMAL
T3RU NFR SERPL: NORMAL %

## 2022-09-08 PROCEDURE — 88305 TISSUE EXAM BY PATHOLOGIST: CPT | Mod: 26,,, | Performed by: PATHOLOGY

## 2022-09-08 PROCEDURE — 88305 TISSUE EXAM BY PATHOLOGIST: CPT | Mod: SUR | Performed by: DERMATOLOGY

## 2022-09-08 PROCEDURE — 88305 PATHOLOGY, DERMATOLOGY: ICD-10-PCS | Mod: 26,,, | Performed by: PATHOLOGY

## 2022-09-09 ENCOUNTER — LAB REQUISITION (OUTPATIENT)
Dept: LAB | Facility: HOSPITAL | Age: 67
End: 2022-09-09
Attending: DERMATOLOGY
Payer: MEDICARE

## 2022-09-09 DIAGNOSIS — L53.8 OTHER SPECIFIED ERYTHEMATOUS CONDITIONS: ICD-10-CM

## 2022-09-09 DIAGNOSIS — D49.2 NEOPLASM OF UNSPECIFIED BEHAVIOR OF BONE, SOFT TISSUE, AND SKIN: ICD-10-CM

## 2022-09-09 DIAGNOSIS — L29.8 OTHER PRURITUS: ICD-10-CM

## 2022-09-29 DIAGNOSIS — I10 HYPERTENSION, UNSPECIFIED TYPE: ICD-10-CM

## 2022-09-29 DIAGNOSIS — K90.9 INTESTINAL MALABSORPTION, UNSPECIFIED TYPE: Primary | ICD-10-CM

## 2022-09-29 DIAGNOSIS — E16.2 HYPOGLYCEMIA, UNSPECIFIED: ICD-10-CM

## 2022-09-30 RX ORDER — ALPRAZOLAM 0.25 MG/1
0.25 TABLET ORAL DAILY PRN
Qty: 90 TABLET | Refills: 1 | Status: SHIPPED | OUTPATIENT
Start: 2022-09-30 | End: 2023-03-29 | Stop reason: SDUPTHER

## 2022-10-04 ENCOUNTER — OFFICE VISIT (OUTPATIENT)
Dept: PRIMARY CARE CLINIC | Facility: CLINIC | Age: 67
End: 2022-10-04
Payer: MEDICARE

## 2022-10-04 VITALS
RESPIRATION RATE: 18 BRPM | DIASTOLIC BLOOD PRESSURE: 92 MMHG | HEART RATE: 98 BPM | BODY MASS INDEX: 31.42 KG/M2 | OXYGEN SATURATION: 98 % | SYSTOLIC BLOOD PRESSURE: 154 MMHG | HEIGHT: 72 IN | WEIGHT: 232 LBS

## 2022-10-04 DIAGNOSIS — I10 PRIMARY HYPERTENSION: ICD-10-CM

## 2022-10-04 DIAGNOSIS — I35.0 NONRHEUMATIC AORTIC VALVE STENOSIS: Primary | ICD-10-CM

## 2022-10-04 DIAGNOSIS — I48.0 PAROXYSMAL ATRIAL FIBRILLATION: ICD-10-CM

## 2022-10-04 DIAGNOSIS — E78.00 PURE HYPERCHOLESTEROLEMIA: ICD-10-CM

## 2022-10-04 PROCEDURE — 99214 OFFICE O/P EST MOD 30 MIN: CPT | Mod: ,,, | Performed by: FAMILY MEDICINE

## 2022-10-04 PROCEDURE — 99214 PR OFFICE/OUTPT VISIT, EST, LEVL IV, 30-39 MIN: ICD-10-PCS | Mod: ,,, | Performed by: FAMILY MEDICINE

## 2022-10-04 RX ORDER — HYDROCODONE BITARTRATE AND ACETAMINOPHEN 10; 325 MG/1; MG/1
1 TABLET ORAL EVERY 6 HOURS PRN
Qty: 120 TABLET | Refills: 0 | Status: CANCELLED | OUTPATIENT
Start: 2022-10-04

## 2022-10-04 NOTE — PROGRESS NOTES
"Subjective:      Patient ID: Shreyas Man is a 67 y.o. male.    Chief Complaint: Erectile Dysfunction    Shreyas Man a 67 y.o. male presents for follow up on all regular problems which are reviewed and discussed.   B12 low, ed  Problem List Items Addressed This Visit          Cardiac/Vascular    Nonrheumatic aortic valve stenosis - Primary    Overview     AVR 8/5/2010 "tissue valve"         Hypertension    Hyperlipidemia    Atrial fibrillation       Past Medical History:  Past Medical History:   Diagnosis Date    Anemia     Arthritis     Atrial fibrillation     Cancer     Coronary artery disease     Encounter for blood transfusion     GERD (gastroesophageal reflux disease)     Hyperlipidemia     Hypertension     Sleep apnea     Thyroid disease      Past Surgical History:   Procedure Laterality Date    CARDIAC CATHETERIZATION      CARDIAC VALVE SURGERY      CORONARY ARTERY BYPASS GRAFT      LEFT HEART CATHETERIZATION Left 8/1/2022    Procedure: Left heart cath;  Surgeon: Tristan Pham MD;  Location: [facility] CATH LAB;  Service: Cardiology;  Laterality: Left;  NO LV GRAM. HE HAS A BIOPROSTHETIC AO VALVE.    SPINE SURGERY       Review of patient's allergies indicates:   Allergen Reactions    Levaquin [levofloxacin]     Toradol [ketorolac]     Wasp sting [allergen ext-venom-honey bee]     Wasp venom Itching and Swelling    Zyrtec [cetirizine]     Tramadol hcl Itching     Current Outpatient Medications on File Prior to Visit   Medication Sig Dispense Refill    ALPRAZolam (XANAX) 0.25 MG tablet Take 1 tablet (0.25 mg total) by mouth daily as needed for Anxiety. 90 tablet 1    amLODIPine (NORVASC) 10 MG tablet Take 1 tablet (10 mg total) by mouth once daily. 90 tablet 3    aspirin (ECOTRIN) 81 MG EC tablet Take 1 tablet (81 mg total) by mouth once daily. 90 tablet 3    atorvastatin (LIPITOR) 80 MG tablet TAKE 1 TABLET DAILY 90 tablet 3    EScitalopram oxalate (LEXAPRO) 10 MG tablet Take 1 tablet (10 " mg total) by mouth once daily. 30 tablet 4    HYDROcodone-acetaminophen (NORCO)  mg per tablet Take 1 tablet by mouth every 6 (six) hours as needed for Pain. 120 tablet 0    levothyroxine (SYNTHROID) 75 MCG tablet Take 1 tablet (75 mcg total) by mouth before breakfast. 90 tablet 3    lisinopriL 10 MG tablet Take 1 tablet (10 mg total) by mouth 2 (two) times daily. 180 tablet 1    methocarbamoL (ROBAXIN) 750 MG Tab Take two tablets 4 times a day as needed. 240 tablet 1    PACERONE 200 mg Tab TAKE 1 TABLET DAILY 90 tablet 3     No current facility-administered medications on file prior to visit.     Social History     Socioeconomic History    Marital status:    Tobacco Use    Smoking status: Former     Packs/day: 1.00     Types: Cigarettes     Quit date: 2010     Years since quittin.3    Smokeless tobacco: Never    Tobacco comments:     quit 10+ years ago   Substance and Sexual Activity    Alcohol use: Never    Drug use: Never     Family History   Problem Relation Age of Onset    Heart attack Mother     Heart attack Father     Heart disease Father        Review of Systems   Constitutional: Negative.    HENT:  Negative for congestion, ear pain, nosebleeds and trouble swallowing.    Eyes:  Negative for pain and itching.   Respiratory:  Negative for chest tightness.    Cardiovascular:  Negative for chest pain.   Gastrointestinal:  Negative for abdominal distention.   Endocrine: Negative for cold intolerance and heat intolerance.   Genitourinary:  Negative for difficulty urinating.   Musculoskeletal:  Negative for arthralgias.   Neurological:  Negative for dizziness.     Objective:     BP (!) 154/92 (BP Location: Right arm, Patient Position: Sitting, BP Method: Large (Manual))   Pulse 98   Resp 18   Ht 6' (1.829 m)   Wt 105.2 kg (232 lb)   SpO2 98%   BMI 31.46 kg/m²     Physical Exam  Constitutional:       Appearance: Normal appearance. He is obese.   HENT:      Head: Normocephalic and  atraumatic.      Right Ear: External ear normal.      Left Ear: External ear normal.      Nose: Nose normal.      Mouth/Throat:      Mouth: Mucous membranes are moist.      Pharynx: Oropharynx is clear.   Eyes:      Pupils: Pupils are equal, round, and reactive to light.   Cardiovascular:      Rate and Rhythm: Normal rate and regular rhythm.      Heart sounds: Normal heart sounds.   Pulmonary:      Effort: Pulmonary effort is normal.      Breath sounds: Normal breath sounds.   Abdominal:      Palpations: Abdomen is soft.   Musculoskeletal:         General: Normal range of motion.      Cervical back: Normal range of motion and neck supple.   Skin:     General: Skin is warm and dry.   Neurological:      General: No focal deficit present.      Mental Status: He is alert.   Psychiatric:         Mood and Affect: Mood normal.         Behavior: Behavior normal.         Thought Content: Thought content normal.         Judgment: Judgment normal.       1. Nonrheumatic aortic valve stenosis    2. Primary hypertension    3. Pure hypercholesterolemia    4. Paroxysmal atrial fibrillation        Plan:     Problem List Items Addressed This Visit          Cardiac/Vascular    Nonrheumatic aortic valve stenosis - Primary    Hypertension    Hyperlipidemia    Atrial fibrillation     No follow-ups on file.    Rx cialis 20mg and norco 10mg n120 hand written  B12 1000mcg qd  I am having Shreyas Man maintain his amLODIPine, atorvastatin, PACERONE, lisinopriL, levothyroxine, methocarbamoL, EScitalopram oxalate, HYDROcodone-acetaminophen, aspirin, and ALPRAZolam.    Shreyas was seen today for erectile dysfunction.    Diagnoses and all orders for this visit:    Nonrheumatic aortic valve stenosis    Primary hypertension    Pure hypercholesterolemia    Paroxysmal atrial fibrillation    Other orders  The following orders have not been finalized:  -     HYDROcodone-acetaminophen (NORCO)  mg per tablet       [unfilled]  No orders of the  defined types were placed in this encounter.

## 2022-10-09 DIAGNOSIS — Z71.89 COMPLEX CARE COORDINATION: ICD-10-CM

## 2022-10-24 PROCEDURE — 88342 PATHOLOGY, DERMATOLOGY: ICD-10-PCS | Mod: 26,,, | Performed by: PATHOLOGY

## 2022-10-24 PROCEDURE — 88341 IMHCHEM/IMCYTCHM EA ADD ANTB: CPT | Mod: 26,,, | Performed by: PATHOLOGY

## 2022-10-24 PROCEDURE — 88305 TISSUE EXAM BY PATHOLOGIST: CPT | Mod: 26,,, | Performed by: PATHOLOGY

## 2022-10-24 PROCEDURE — 88342 IMHCHEM/IMCYTCHM 1ST ANTB: CPT | Mod: 26,,, | Performed by: PATHOLOGY

## 2022-10-24 PROCEDURE — 88341 PATHOLOGY, DERMATOLOGY: ICD-10-PCS | Mod: 26,,, | Performed by: PATHOLOGY

## 2022-10-24 PROCEDURE — 88305 TISSUE EXAM BY PATHOLOGIST: CPT | Mod: SUR | Performed by: DERMATOLOGY

## 2022-10-24 PROCEDURE — 88305 PATHOLOGY, DERMATOLOGY: ICD-10-PCS | Mod: 26,,, | Performed by: PATHOLOGY

## 2022-10-25 ENCOUNTER — LAB REQUISITION (OUTPATIENT)
Dept: LAB | Facility: HOSPITAL | Age: 67
End: 2022-10-25
Attending: DERMATOLOGY
Payer: MEDICARE

## 2022-10-25 DIAGNOSIS — D03.39 MELANOMA IN SITU OF OTHER PARTS OF FACE: ICD-10-CM

## 2022-10-26 LAB
DHEA SERPL-MCNC: NORMAL
ESTROGEN SERPL-MCNC: NORMAL PG/ML
INSULIN SERPL-ACNC: NORMAL U[IU]/ML
LAB AP GROSS DESCRIPTION: NORMAL
LAB AP LABORATORY NOTES: NORMAL
LAB AP SPEC A DDX: NORMAL
LAB AP SPEC A MORPHOLOGY: NORMAL
LAB AP SPEC A PROCEDURE: NORMAL
LAB AP SYNOPTIC CHECKLIST: NORMAL
T3RU NFR SERPL: NORMAL %

## 2022-10-27 ENCOUNTER — LAB REQUISITION (OUTPATIENT)
Dept: LAB | Facility: HOSPITAL | Age: 67
End: 2022-10-27
Attending: DERMATOLOGY
Payer: MEDICARE

## 2022-10-27 DIAGNOSIS — D48.5 NEOPLASM OF UNCERTAIN BEHAVIOR OF SKIN: ICD-10-CM

## 2022-10-27 PROCEDURE — 88305 TISSUE EXAM BY PATHOLOGIST: CPT | Mod: SUR | Performed by: DERMATOLOGY

## 2022-10-27 PROCEDURE — 88305 PATHOLOGY, DERMATOLOGY: ICD-10-PCS | Mod: 26,,, | Performed by: PATHOLOGY

## 2022-10-27 PROCEDURE — 88341 IMHCHEM/IMCYTCHM EA ADD ANTB: CPT | Mod: 26,,, | Performed by: PATHOLOGY

## 2022-10-27 PROCEDURE — 88342 PATHOLOGY, DERMATOLOGY: ICD-10-PCS | Mod: 26,,, | Performed by: PATHOLOGY

## 2022-10-27 PROCEDURE — 88305 TISSUE EXAM BY PATHOLOGIST: CPT | Mod: 26,,, | Performed by: PATHOLOGY

## 2022-10-27 PROCEDURE — 88341 PATHOLOGY, DERMATOLOGY: ICD-10-PCS | Mod: 26,,, | Performed by: PATHOLOGY

## 2022-10-27 PROCEDURE — 88342 IMHCHEM/IMCYTCHM 1ST ANTB: CPT | Mod: 26,,, | Performed by: PATHOLOGY

## 2022-10-28 LAB
DHEA SERPL-MCNC: NORMAL
ESTROGEN SERPL-MCNC: NORMAL PG/ML
INSULIN SERPL-ACNC: NORMAL U[IU]/ML
LAB AP GROSS DESCRIPTION: NORMAL
LAB AP LABORATORY NOTES: NORMAL
LAB AP SPEC A DDX: NORMAL
LAB AP SPEC A MORPHOLOGY: NORMAL
LAB AP SPEC A PROCEDURE: NORMAL
T3RU NFR SERPL: NORMAL %

## 2022-11-01 RX ORDER — HYDROCODONE BITARTRATE AND ACETAMINOPHEN 10; 325 MG/1; MG/1
1 TABLET ORAL EVERY 6 HOURS PRN
Qty: 120 TABLET | Refills: 0 | Status: SHIPPED | OUTPATIENT
Start: 2022-11-01 | End: 2022-12-01 | Stop reason: SDUPTHER

## 2022-11-10 ENCOUNTER — OFFICE VISIT (OUTPATIENT)
Dept: SURGERY | Facility: CLINIC | Age: 67
End: 2022-11-10
Attending: SURGERY
Payer: MEDICARE

## 2022-11-10 DIAGNOSIS — E21.4 OTHER SPECIFIED DISORDERS OF PARATHYROID GLAND: Primary | ICD-10-CM

## 2022-11-10 PROCEDURE — 99213 PR OFFICE/OUTPT VISIT, EST, LEVL III, 20-29 MIN: ICD-10-PCS | Mod: S$PBB,,, | Performed by: SURGERY

## 2022-11-10 PROCEDURE — 99213 OFFICE O/P EST LOW 20 MIN: CPT | Mod: S$PBB,,, | Performed by: SURGERY

## 2022-11-10 PROCEDURE — 99213 OFFICE O/P EST LOW 20 MIN: CPT | Mod: PBBFAC | Performed by: SURGERY

## 2022-11-10 NOTE — PROGRESS NOTES
General Surgery Progress Note    Subjective:      Patient ID: Shreyas Man is a 67 y.o. male.    Chief Complaint: Follow-up (6 month f/u)      HPI:  67-year-old male six-month follow-up for hyperparathyroidism.  He denies any symptoms no constipation, kidney stones, fractures.  He denies any psychological depression or issues otherwise.  He does have a history of melanoma for which she is been evaluated for and operated on.    Past Medical History:   Diagnosis Date    Anemia     Arthritis     Atrial fibrillation     Cancer     Coronary artery disease     Encounter for blood transfusion     GERD (gastroesophageal reflux disease)     Hyperlipidemia     Hypertension     Sleep apnea     Thyroid disease      Past Surgical History:   Procedure Laterality Date    CARDIAC CATHETERIZATION      CARDIAC VALVE SURGERY      CORONARY ARTERY BYPASS GRAFT      LEFT HEART CATHETERIZATION Left 2022    Procedure: Left heart cath;  Surgeon: Tristan Pham MD;  Location: [facility] CATH LAB;  Service: Cardiology;  Laterality: Left;  NO LV GRAM. HE HAS A BIOPROSTHETIC AO VALVE.    SPINE SURGERY       Social History     Socioeconomic History    Marital status:    Tobacco Use    Smoking status: Former     Packs/day: 1.00     Types: Cigarettes     Quit date: 2010     Years since quittin.4    Smokeless tobacco: Never    Tobacco comments:     quit 10+ years ago   Substance and Sexual Activity    Alcohol use: Never    Drug use: Never         Current Outpatient Medications:     ALPRAZolam (XANAX) 0.25 MG tablet, Take 1 tablet (0.25 mg total) by mouth daily as needed for Anxiety., Disp: 90 tablet, Rfl: 1    amLODIPine (NORVASC) 10 MG tablet, Take 1 tablet (10 mg total) by mouth once daily., Disp: 90 tablet, Rfl: 3    aspirin (ECOTRIN) 81 MG EC tablet, Take 1 tablet (81 mg total) by mouth once daily., Disp: 90 tablet, Rfl: 3    atorvastatin (LIPITOR) 80 MG  tablet, TAKE 1 TABLET DAILY, Disp: 90 tablet, Rfl: 3    EScitalopram oxalate (LEXAPRO) 10 MG tablet, Take 1 tablet (10 mg total) by mouth once daily., Disp: 30 tablet, Rfl: 4    HYDROcodone-acetaminophen (NORCO)  mg per tablet, Take 1 tablet by mouth every 6 (six) hours as needed for Pain., Disp: 120 tablet, Rfl: 0    levothyroxine (SYNTHROID) 75 MCG tablet, Take 1 tablet (75 mcg total) by mouth before breakfast., Disp: 90 tablet, Rfl: 3    lisinopriL 10 MG tablet, Take 1 tablet (10 mg total) by mouth 2 (two) times daily., Disp: 180 tablet, Rfl: 1    methocarbamoL (ROBAXIN) 750 MG Tab, Take two tablets 4 times a day as needed., Disp: 240 tablet, Rfl: 1    PACERONE 200 mg Tab, TAKE 1 TABLET DAILY, Disp: 90 tablet, Rfl: 3  Review of patient's allergies indicates:   Allergen Reactions    Levaquin [levofloxacin]     Toradol [ketorolac]     Wasp sting [allergen ext-venom-honey bee]     Wasp venom Itching and Swelling    Zyrtec [cetirizine]     Tramadol hcl Itching       There were no vitals taken for this visit.    ROS      Objective:     Constitutional: Well, No apparent distress  Mental Status: Alert and oriented  x 3  Eyes: Normal sclera, normal eyelid  Neck: Trachea midline, no masses on neck exam  Respiratory: Clear to auscultation bilaterally with no wheezes/crackles/cough  Cardiac: Regular rate and rhythm, no murmur, rub, or gallops  Abdominal: Soft, non-tender, non-distented  Hernia: No appreciated hernias  Musculoskeletal: 5/5 strength no weakess no decreased range of montion  Neurologic: Cranial nerves II - XII intact    Labs/ Imaging: CBC:   Lab Results   Component Value Date/Time    WBC 6.37 09/30/2022 07:55 AM    RBC 5.41 09/30/2022 07:55 AM    HGB 17.5 09/30/2022 07:55 AM    HCT 52.4 09/30/2022 07:55 AM     09/30/2022 07:55 AM    MCV 96.9 (H) 09/30/2022 07:55 AM    MCH 32.3 (H) 09/30/2022 07:55 AM    MCHC 33.4 09/30/2022 07:55 AM     BMP:   Lab Results   Component Value Date/Time    GLU 94  09/30/2022 07:55 AM    CO2 31 09/30/2022 07:55 AM    BUN 16 09/30/2022 07:55 AM    CREATININE 0.95 09/30/2022 07:55 AM    CALCIUM 10.4 (H) 09/30/2022 07:55 AM           Assessment:             1. Other specified disorders of parathyroid gland          Plan:       Orders Placed This Encounter    PTH, Intact    Miscellaneous Test, Sendout ionized calcium    Calcium, 24 Hr Urine     No follow-ups on file.      Will check some lab work and call the patient if normal will schedule 1 year follow-up with repeat blood work for now.  He will come back sooner for any issues.  All questions were answered.

## 2022-12-01 RX ORDER — AMLODIPINE BESYLATE 10 MG/1
10 TABLET ORAL DAILY
Qty: 90 TABLET | Refills: 3 | Status: SHIPPED | OUTPATIENT
Start: 2022-12-01 | End: 2023-07-17 | Stop reason: SDUPTHER

## 2022-12-01 RX ORDER — HYDROCODONE BITARTRATE AND ACETAMINOPHEN 10; 325 MG/1; MG/1
1 TABLET ORAL EVERY 6 HOURS PRN
Qty: 120 TABLET | Refills: 0 | Status: SHIPPED | OUTPATIENT
Start: 2022-12-01 | End: 2023-01-03 | Stop reason: SDUPTHER

## 2022-12-14 ENCOUNTER — OFFICE VISIT (OUTPATIENT)
Dept: PRIMARY CARE CLINIC | Facility: CLINIC | Age: 67
End: 2022-12-14
Payer: MEDICARE

## 2022-12-14 VITALS
BODY MASS INDEX: 31.46 KG/M2 | DIASTOLIC BLOOD PRESSURE: 90 MMHG | OXYGEN SATURATION: 95 % | SYSTOLIC BLOOD PRESSURE: 160 MMHG | RESPIRATION RATE: 18 BRPM | WEIGHT: 232.31 LBS | HEIGHT: 72 IN | HEART RATE: 96 BPM

## 2022-12-14 DIAGNOSIS — J02.8 ACUTE PHARYNGITIS DUE TO OTHER SPECIFIED ORGANISMS: ICD-10-CM

## 2022-12-14 DIAGNOSIS — J30.1 NON-SEASONAL ALLERGIC RHINITIS DUE TO POLLEN: ICD-10-CM

## 2022-12-14 DIAGNOSIS — I10 PRIMARY HYPERTENSION: Primary | ICD-10-CM

## 2022-12-14 DIAGNOSIS — E78.00 PURE HYPERCHOLESTEROLEMIA: ICD-10-CM

## 2022-12-14 DIAGNOSIS — I48.0 PAROXYSMAL ATRIAL FIBRILLATION: ICD-10-CM

## 2022-12-14 PROCEDURE — 99214 PR OFFICE/OUTPT VISIT, EST, LEVL IV, 30-39 MIN: ICD-10-PCS | Mod: ,,, | Performed by: FAMILY MEDICINE

## 2022-12-14 PROCEDURE — 96372 PR INJECTION,THERAP/PROPH/DIAG2ST, IM OR SUBCUT: ICD-10-PCS | Mod: ,,, | Performed by: FAMILY MEDICINE

## 2022-12-14 PROCEDURE — 99214 OFFICE O/P EST MOD 30 MIN: CPT | Mod: ,,, | Performed by: FAMILY MEDICINE

## 2022-12-14 PROCEDURE — 96372 THER/PROPH/DIAG INJ SC/IM: CPT | Mod: ,,, | Performed by: FAMILY MEDICINE

## 2022-12-14 RX ORDER — AZITHROMYCIN 250 MG/1
TABLET, FILM COATED ORAL
Qty: 6 TABLET | Refills: 1 | Status: SHIPPED | OUTPATIENT
Start: 2022-12-14 | End: 2022-12-19

## 2022-12-14 RX ORDER — BENZONATATE 200 MG/1
200 CAPSULE ORAL 3 TIMES DAILY PRN
Qty: 30 CAPSULE | Refills: 3 | Status: SHIPPED | OUTPATIENT
Start: 2022-12-14 | End: 2022-12-24

## 2022-12-14 RX ORDER — PREDNISONE 20 MG/1
40 TABLET ORAL DAILY
Qty: 10 TABLET | Refills: 0 | Status: SHIPPED | OUTPATIENT
Start: 2022-12-14 | End: 2023-01-24

## 2022-12-14 RX ORDER — METHYLPREDNISOLONE ACETATE 80 MG/ML
80 INJECTION, SUSPENSION INTRA-ARTICULAR; INTRALESIONAL; INTRAMUSCULAR; SOFT TISSUE
Status: COMPLETED | OUTPATIENT
Start: 2022-12-14 | End: 2022-12-14

## 2022-12-14 RX ORDER — LINCOMYCIN HYDROCHLORIDE 300 MG/ML
600 INJECTION, SOLUTION INTRAMUSCULAR; INTRAVENOUS; SUBCONJUNCTIVAL
Status: COMPLETED | OUTPATIENT
Start: 2022-12-14 | End: 2022-12-14

## 2022-12-14 RX ORDER — DEXAMETHASONE SODIUM PHOSPHATE 4 MG/ML
4 INJECTION, SOLUTION INTRA-ARTICULAR; INTRALESIONAL; INTRAMUSCULAR; INTRAVENOUS; SOFT TISSUE
Status: COMPLETED | OUTPATIENT
Start: 2022-12-14 | End: 2022-12-14

## 2022-12-14 RX ADMIN — METHYLPREDNISOLONE ACETATE 80 MG: 80 INJECTION, SUSPENSION INTRA-ARTICULAR; INTRALESIONAL; INTRAMUSCULAR; SOFT TISSUE at 11:12

## 2022-12-14 RX ADMIN — LINCOMYCIN HYDROCHLORIDE 600 MG: 300 INJECTION, SOLUTION INTRAMUSCULAR; INTRAVENOUS; SUBCONJUNCTIVAL at 11:12

## 2022-12-14 RX ADMIN — DEXAMETHASONE SODIUM PHOSPHATE 4 MG: 4 INJECTION, SOLUTION INTRA-ARTICULAR; INTRALESIONAL; INTRAMUSCULAR; INTRAVENOUS; SOFT TISSUE at 11:12

## 2022-12-14 NOTE — PROGRESS NOTES
Subjective:      Patient ID: Shreyas Man is a 67 y.o. male.    Chief Complaint: Sinus Problem, Sore Throat, and Cough (Dry )    Shreyas Man a 67 y.o. male presents for follow up on all regular problems which are reviewed and discussed.     Problem List Items Addressed This Visit          ENT    Acute pharyngitis due to other specified organisms    Non-seasonal allergic rhinitis due to pollen       Cardiac/Vascular    Hypertension - Primary    Hyperlipidemia    Atrial fibrillation       Past Medical History:  Past Medical History:   Diagnosis Date    Anemia     Arthritis     Atrial fibrillation     Cancer     Coronary artery disease     Encounter for blood transfusion     GERD (gastroesophageal reflux disease)     Hyperlipidemia     Hypertension     Sleep apnea     Thyroid disease      Past Surgical History:   Procedure Laterality Date    CARDIAC CATHETERIZATION      CARDIAC VALVE SURGERY      CORONARY ARTERY BYPASS GRAFT      LEFT HEART CATHETERIZATION Left 8/1/2022    Procedure: Left heart cath;  Surgeon: Tristan Pham MD;  Location: [facility] CATH LAB;  Service: Cardiology;  Laterality: Left;  NO LV GRAM. HE HAS A BIOPROSTHETIC AO VALVE.    SPINE SURGERY       Review of patient's allergies indicates:   Allergen Reactions    Levaquin [levofloxacin]     Toradol [ketorolac]     Wasp sting [allergen ext-venom-honey bee]     Wasp venom Itching and Swelling    Zyrtec [cetirizine]     Tramadol hcl Itching     Current Outpatient Medications on File Prior to Visit   Medication Sig Dispense Refill    amLODIPine (NORVASC) 10 MG tablet Take 1 tablet (10 mg total) by mouth once daily. 90 tablet 3    atorvastatin (LIPITOR) 80 MG tablet TAKE 1 TABLET DAILY 90 tablet 3    HYDROcodone-acetaminophen (NORCO)  mg per tablet Take 1 tablet by mouth every 6 (six) hours as needed for Pain. 120 tablet 0    levothyroxine (SYNTHROID) 75 MCG tablet Take 1 tablet (75 mcg total) by mouth before breakfast. 90 tablet  3    lisinopriL 10 MG tablet Take 1 tablet (10 mg total) by mouth 2 (two) times daily. 180 tablet 1    methocarbamoL (ROBAXIN) 750 MG Tab Take two tablets 4 times a day as needed. 240 tablet 1    ALPRAZolam (XANAX) 0.25 MG tablet Take 1 tablet (0.25 mg total) by mouth daily as needed for Anxiety. 90 tablet 1    aspirin (ECOTRIN) 81 MG EC tablet Take 1 tablet (81 mg total) by mouth once daily. 90 tablet 3    EScitalopram oxalate (LEXAPRO) 10 MG tablet Take 1 tablet (10 mg total) by mouth once daily. 30 tablet 4    PACERONE 200 mg Tab TAKE 1 TABLET DAILY 90 tablet 3     No current facility-administered medications on file prior to visit.     Social History     Socioeconomic History    Marital status:    Tobacco Use    Smoking status: Former     Packs/day: 1.00     Types: Cigarettes     Quit date: 2010     Years since quittin.5    Smokeless tobacco: Never    Tobacco comments:     quit 10+ years ago   Substance and Sexual Activity    Alcohol use: Never    Drug use: Never     Family History   Problem Relation Age of Onset    Heart attack Mother     Heart attack Father     Heart disease Father        Review of Systems   Constitutional: Negative.    HENT:  Positive for congestion, postnasal drip, sinus pressure, sneezing and sore throat. Negative for ear pain, nosebleeds and trouble swallowing.    Eyes:  Negative for pain and itching.   Respiratory:  Negative for chest tightness.    Cardiovascular:  Negative for chest pain.   Gastrointestinal:  Negative for abdominal distention.   Endocrine: Negative for cold intolerance and heat intolerance.   Genitourinary:  Negative for difficulty urinating.   Musculoskeletal:  Negative for arthralgias.   Neurological:  Negative for dizziness.     Objective:     BP (!) 160/90 (BP Location: Right arm, Patient Position: Sitting, BP Method: Large (Manual))   Pulse 96   Resp 18   Ht 6' (1.829 m)   Wt 105.4 kg (232 lb 4.8 oz)   SpO2 95%   BMI 31.51 kg/m²     Physical  Exam  Constitutional:       General: He is not in acute distress.     Appearance: Normal appearance. He is obese. He is not toxic-appearing.   HENT:      Head: Normocephalic and atraumatic.      Right Ear: External ear normal.      Left Ear: External ear normal.      Nose: Congestion present.      Mouth/Throat:      Mouth: Mucous membranes are moist.      Pharynx: Oropharynx is clear.   Eyes:      Pupils: Pupils are equal, round, and reactive to light.   Cardiovascular:      Rate and Rhythm: Normal rate and regular rhythm.      Heart sounds: Normal heart sounds. No murmur heard.    No gallop.   Pulmonary:      Effort: Pulmonary effort is normal. No respiratory distress.      Breath sounds: Normal breath sounds. No wheezing or rales.   Abdominal:      Palpations: Abdomen is soft.   Musculoskeletal:         General: Normal range of motion.      Cervical back: Normal range of motion and neck supple.   Skin:     General: Skin is warm and dry.   Neurological:      General: No focal deficit present.      Mental Status: He is alert.   Psychiatric:         Mood and Affect: Mood normal.         Behavior: Behavior normal.         Thought Content: Thought content normal.         Judgment: Judgment normal.       1. Primary hypertension    2. Acute pharyngitis due to other specified organisms    3. Non-seasonal allergic rhinitis due to pollen    4. Pure hypercholesterolemia    5. Paroxysmal atrial fibrillation        Plan:     Problem List Items Addressed This Visit          ENT    Acute pharyngitis due to other specified organisms    Non-seasonal allergic rhinitis due to pollen       Cardiac/Vascular    Hypertension - Primary    Hyperlipidemia    Atrial fibrillation     No follow-ups on file.  Has fu   rx sent    I am having Shreyas JOLLYKenyatta Man start on azithromycin, predniSONE, and benzonatate. I am also having him maintain his atorvastatin, PACERONE, lisinopriL, levothyroxine, methocarbamoL, EScitalopram oxalate, aspirin,  ALPRAZolam, amLODIPine, and HYDROcodone-acetaminophen. We administered dexAMETHasone. We will continue to administer lincomycin and methylPREDNISolone acetate.    Shreyas was seen today for sinus problem, sore throat and cough.    Diagnoses and all orders for this visit:    Primary hypertension    Acute pharyngitis due to other specified organisms    Non-seasonal allergic rhinitis due to pollen    Pure hypercholesterolemia    Paroxysmal atrial fibrillation    Other orders  -     lincomycin injection 600 mg  -     dexAMETHasone injection 4 mg  -     methylPREDNISolone acetate injection 80 mg  -     azithromycin (Z-UMM) 250 MG tablet; Take 2 tablets by mouth on day 1; Take 1 tablet by mouth on days 2-5  -     predniSONE (DELTASONE) 20 MG tablet; Take 2 tablets (40 mg total) by mouth once daily.  -     benzonatate (TESSALON) 200 MG capsule; Take 1 capsule (200 mg total) by mouth 3 (three) times daily as needed.      Medications Ordered This Encounter   Medications    azithromycin (Z-UMM) 250 MG tablet     Sig: Take 2 tablets by mouth on day 1; Take 1 tablet by mouth on days 2-5     Dispense:  6 tablet     Refill:  1    benzonatate (TESSALON) 200 MG capsule     Sig: Take 1 capsule (200 mg total) by mouth 3 (three) times daily as needed.     Dispense:  30 capsule     Refill:  3    dexAMETHasone injection 4 mg    lincomycin injection 600 mg    methylPREDNISolone acetate injection 80 mg    predniSONE (DELTASONE) 20 MG tablet     Sig: Take 2 tablets (40 mg total) by mouth once daily.     Dispense:  10 tablet     Refill:  0     [unfilled]  No orders of the defined types were placed in this encounter.

## 2023-01-03 PROCEDURE — 88305 TISSUE EXAM BY PATHOLOGIST: CPT | Mod: 26,,, | Performed by: PATHOLOGY

## 2023-01-03 PROCEDURE — 88305 TISSUE EXAM BY PATHOLOGIST: CPT | Mod: TC,59,SUR | Performed by: DERMATOLOGY

## 2023-01-03 PROCEDURE — 88305 PATHOLOGY, DERMATOLOGY: ICD-10-PCS | Mod: 26,,, | Performed by: PATHOLOGY

## 2023-01-04 ENCOUNTER — LAB REQUISITION (OUTPATIENT)
Dept: LAB | Facility: HOSPITAL | Age: 68
End: 2023-01-04
Attending: DERMATOLOGY
Payer: MEDICARE

## 2023-01-04 DIAGNOSIS — D49.2 NEOPLASM OF UNSPECIFIED BEHAVIOR OF BONE, SOFT TISSUE, AND SKIN: ICD-10-CM

## 2023-01-04 RX ORDER — HYDROCODONE BITARTRATE AND ACETAMINOPHEN 10; 325 MG/1; MG/1
1 TABLET ORAL EVERY 6 HOURS PRN
Qty: 120 TABLET | Refills: 0 | Status: SHIPPED | OUTPATIENT
Start: 2023-01-04 | End: 2023-02-01 | Stop reason: SDUPTHER

## 2023-01-05 LAB
DHEA SERPL-MCNC: NORMAL
ESTROGEN SERPL-MCNC: NORMAL PG/ML
INSULIN SERPL-ACNC: NORMAL U[IU]/ML
LAB AP GROSS DESCRIPTION: NORMAL
LAB AP LABORATORY NOTES: NORMAL
LAB AP SPEC A DDX: NORMAL
LAB AP SPEC A MORPHOLOGY: NORMAL
LAB AP SPEC B DDX: NORMAL
LAB AP SPEC B MORPHOLOGY: NORMAL
LAB AP SPEC B PROCEDURE: NORMAL
T3RU NFR SERPL: NORMAL %

## 2023-01-24 ENCOUNTER — OFFICE VISIT (OUTPATIENT)
Dept: PRIMARY CARE CLINIC | Facility: CLINIC | Age: 68
End: 2023-01-24
Payer: MEDICARE

## 2023-01-24 VITALS
HEIGHT: 72 IN | WEIGHT: 231 LBS | DIASTOLIC BLOOD PRESSURE: 88 MMHG | RESPIRATION RATE: 18 BRPM | HEART RATE: 79 BPM | BODY MASS INDEX: 31.29 KG/M2 | OXYGEN SATURATION: 97 % | SYSTOLIC BLOOD PRESSURE: 136 MMHG

## 2023-01-24 DIAGNOSIS — I35.0 NONRHEUMATIC AORTIC VALVE STENOSIS: Primary | ICD-10-CM

## 2023-01-24 DIAGNOSIS — G89.29 CHRONIC LOW BACK PAIN, UNSPECIFIED BACK PAIN LATERALITY, UNSPECIFIED WHETHER SCIATICA PRESENT: ICD-10-CM

## 2023-01-24 DIAGNOSIS — I10 PRIMARY HYPERTENSION: ICD-10-CM

## 2023-01-24 DIAGNOSIS — M54.50 CHRONIC LOW BACK PAIN, UNSPECIFIED BACK PAIN LATERALITY, UNSPECIFIED WHETHER SCIATICA PRESENT: ICD-10-CM

## 2023-01-24 DIAGNOSIS — Z95.2 HX OF AORTIC VALVE REPLACEMENT: ICD-10-CM

## 2023-01-24 DIAGNOSIS — I48.0 PAROXYSMAL ATRIAL FIBRILLATION: ICD-10-CM

## 2023-01-24 DIAGNOSIS — E78.00 PURE HYPERCHOLESTEROLEMIA: ICD-10-CM

## 2023-01-24 PROCEDURE — 99214 PR OFFICE/OUTPT VISIT, EST, LEVL IV, 30-39 MIN: ICD-10-PCS | Mod: ,,, | Performed by: FAMILY MEDICINE

## 2023-01-24 PROCEDURE — 96372 THER/PROPH/DIAG INJ SC/IM: CPT | Mod: ,,, | Performed by: FAMILY MEDICINE

## 2023-01-24 PROCEDURE — 96372 PR INJECTION,THERAP/PROPH/DIAG2ST, IM OR SUBCUT: ICD-10-PCS | Mod: ,,, | Performed by: FAMILY MEDICINE

## 2023-01-24 PROCEDURE — 99214 OFFICE O/P EST MOD 30 MIN: CPT | Mod: ,,, | Performed by: FAMILY MEDICINE

## 2023-01-24 RX ORDER — IMIQUIMOD 12.5 MG/.25G
CREAM TOPICAL NIGHTLY
COMMUNITY
Start: 2023-01-04

## 2023-01-24 RX ORDER — TADALAFIL 20 MG/1
20 TABLET ORAL EVERY OTHER DAY
COMMUNITY
Start: 2022-12-01 | End: 2023-10-11 | Stop reason: SDUPTHER

## 2023-01-24 RX ORDER — DEXAMETHASONE SODIUM PHOSPHATE 4 MG/ML
4 INJECTION, SOLUTION INTRA-ARTICULAR; INTRALESIONAL; INTRAMUSCULAR; INTRAVENOUS; SOFT TISSUE
Status: COMPLETED | OUTPATIENT
Start: 2023-01-24 | End: 2023-01-24

## 2023-01-24 RX ORDER — METHYLPREDNISOLONE ACETATE 80 MG/ML
80 INJECTION, SUSPENSION INTRA-ARTICULAR; INTRALESIONAL; INTRAMUSCULAR; SOFT TISSUE
Status: COMPLETED | OUTPATIENT
Start: 2023-01-24 | End: 2023-01-24

## 2023-01-24 RX ADMIN — METHYLPREDNISOLONE ACETATE 80 MG: 80 INJECTION, SUSPENSION INTRA-ARTICULAR; INTRALESIONAL; INTRAMUSCULAR; SOFT TISSUE at 02:01

## 2023-01-24 RX ADMIN — DEXAMETHASONE SODIUM PHOSPHATE 4 MG: 4 INJECTION, SOLUTION INTRA-ARTICULAR; INTRALESIONAL; INTRAMUSCULAR; INTRAVENOUS; SOFT TISSUE at 02:01

## 2023-01-24 NOTE — PROGRESS NOTES
"Subjective:      Patient ID: Shreyas Man is a 67 y.o. male.    Chief Complaint: Back Pain    Shreyas Man a 67 y.o. male presents for follow up on all regular problems which are reviewed and discussed.   Took two robaxin now here. May not have taken it long enough....  Problem List Items Addressed This Visit          Cardiac/Vascular    Nonrheumatic aortic valve stenosis - Primary    Overview     AVR 8/5/2010 "tissue valve"         Hx of aortic valve replacement    Hypertension    Hyperlipidemia    Atrial fibrillation       Orthopedic    Low back pain       Past Medical History:  Past Medical History:   Diagnosis Date    Anemia     Arthritis     Atrial fibrillation     Cancer     Coronary artery disease     Encounter for blood transfusion     GERD (gastroesophageal reflux disease)     Hyperlipidemia     Hypertension     Sleep apnea     Thyroid disease      Past Surgical History:   Procedure Laterality Date    CARDIAC CATHETERIZATION      CARDIAC VALVE SURGERY      CORONARY ARTERY BYPASS GRAFT      LEFT HEART CATHETERIZATION Left 8/1/2022    Procedure: Left heart cath;  Surgeon: Tristan Pham MD;  Location: [facility] CATH LAB;  Service: Cardiology;  Laterality: Left;  NO LV GRAM. HE HAS A BIOPROSTHETIC AO VALVE.    SPINE SURGERY       Review of patient's allergies indicates:   Allergen Reactions    Levaquin [levofloxacin]     Toradol [ketorolac]     Wasp sting [allergen ext-venom-honey bee]     Wasp venom Itching and Swelling    Zyrtec [cetirizine]     Tramadol hcl Itching     Current Outpatient Medications on File Prior to Visit   Medication Sig Dispense Refill    ALPRAZolam (XANAX) 0.25 MG tablet Take 1 tablet (0.25 mg total) by mouth daily as needed for Anxiety. 90 tablet 1    amLODIPine (NORVASC) 10 MG tablet Take 1 tablet (10 mg total) by mouth once daily. 90 tablet 3    aspirin (ECOTRIN) 81 MG EC tablet Take 1 tablet (81 mg total) by mouth once daily. 90 tablet 3    atorvastatin (LIPITOR) " 80 MG tablet TAKE 1 TABLET DAILY 90 tablet 3    EScitalopram oxalate (LEXAPRO) 10 MG tablet Take 1 tablet (10 mg total) by mouth once daily. 30 tablet 4    imiquimod (ALDARA) 5 % cream Apply topically every evening.      lisinopriL 10 MG tablet Take 1 tablet (10 mg total) by mouth 2 (two) times daily. 180 tablet 1    methocarbamoL (ROBAXIN) 750 MG Tab Take two tablets 4 times a day as needed. 240 tablet 1    PACERONE 200 mg Tab TAKE 1 TABLET DAILY 90 tablet 3    tadalafiL (CIALIS) 20 MG Tab Take 20 mg by mouth every other day.       No current facility-administered medications on file prior to visit.     Social History     Socioeconomic History    Marital status:    Tobacco Use    Smoking status: Former     Packs/day: 1.00     Types: Cigarettes     Quit date: 2010     Years since quittin.6    Smokeless tobacco: Never    Tobacco comments:     quit 10+ years ago   Substance and Sexual Activity    Alcohol use: Never    Drug use: Never     Family History   Problem Relation Age of Onset    Heart attack Mother     Heart attack Father     Heart disease Father        Review of Systems   Constitutional: Negative.    HENT:  Negative for congestion, ear pain, nosebleeds and trouble swallowing.    Eyes:  Negative for pain and itching.   Respiratory:  Negative for chest tightness.    Cardiovascular:  Negative for chest pain.   Gastrointestinal:  Negative for abdominal distention.   Endocrine: Negative for cold intolerance and heat intolerance.   Genitourinary:  Negative for difficulty urinating.   Musculoskeletal:  Negative for arthralgias.   Neurological:  Negative for dizziness.     Objective:     /88 (BP Location: Right arm, Patient Position: Sitting, BP Method: Large (Manual))   Pulse 79   Resp 18   Ht 6' (1.829 m)   Wt 104.8 kg (231 lb)   SpO2 97%   BMI 31.33 kg/m²     Physical Exam  Constitutional:       Appearance: Normal appearance. He is obese.   HENT:      Head: Normocephalic and  atraumatic.      Right Ear: External ear normal.      Left Ear: External ear normal.      Nose: Nose normal.      Mouth/Throat:      Mouth: Mucous membranes are moist.      Pharynx: Oropharynx is clear.   Eyes:      Pupils: Pupils are equal, round, and reactive to light.   Cardiovascular:      Rate and Rhythm: Normal rate and regular rhythm.      Heart sounds: Normal heart sounds.   Pulmonary:      Effort: Pulmonary effort is normal.      Breath sounds: Normal breath sounds.   Abdominal:      Palpations: Abdomen is soft.   Musculoskeletal:         General: Normal range of motion.      Cervical back: Normal range of motion and neck supple.   Skin:     General: Skin is warm and dry.   Neurological:      General: No focal deficit present.      Mental Status: He is alert.   Psychiatric:         Mood and Affect: Mood normal.         Behavior: Behavior normal.         Thought Content: Thought content normal.         Judgment: Judgment normal.       1. Nonrheumatic aortic valve stenosis    2. Hx of aortic valve replacement    3. Primary hypertension    4. Pure hypercholesterolemia    5. Paroxysmal atrial fibrillation    6. Chronic low back pain, unspecified back pain laterality, unspecified whether sciatica present        Plan:     Problem List Items Addressed This Visit          Cardiac/Vascular    Nonrheumatic aortic valve stenosis - Primary    Hx of aortic valve replacement    Hypertension    Hyperlipidemia    Atrial fibrillation       Orthopedic    Low back pain     No follow-ups on file.  Stay on robaxin 2 qid and or norco. Steroid injection given  Reason for injection documented in chief complaint redocumentation is redundant-- so I have redocumented  I am having Shreyas PETERS Magda maintain his atorvastatin, PACERONE, lisinopriL, methocarbamoL, EScitalopram oxalate, aspirin, ALPRAZolam, amLODIPine, imiquimod, and tadalafiL. We administered dexAMETHasone and methylPREDNISolone acetate.    Shreyas was seen today for back  pain.    Diagnoses and all orders for this visit:    Nonrheumatic aortic valve stenosis    Hx of aortic valve replacement    Primary hypertension    Pure hypercholesterolemia    Paroxysmal atrial fibrillation    Chronic low back pain, unspecified back pain laterality, unspecified whether sciatica present    Other orders  -     dexAMETHasone injection 4 mg  -     methylPREDNISolone acetate injection 80 mg      Medications Ordered This Encounter   Medications    dexAMETHasone injection 4 mg    methylPREDNISolone acetate injection 80 mg     [unfilled]  No orders of the defined types were placed in this encounter.

## 2023-01-30 RX ORDER — LEVOTHYROXINE SODIUM 75 UG/1
75 TABLET ORAL
Qty: 90 TABLET | Refills: 3 | Status: SHIPPED | OUTPATIENT
Start: 2023-01-30

## 2023-02-01 RX ORDER — HYDROCODONE BITARTRATE AND ACETAMINOPHEN 10; 325 MG/1; MG/1
1 TABLET ORAL EVERY 6 HOURS PRN
Qty: 120 TABLET | Refills: 0 | Status: SHIPPED | OUTPATIENT
Start: 2023-02-01 | End: 2023-03-01 | Stop reason: SDUPTHER

## 2023-02-17 DIAGNOSIS — Z95.2 HISTORY OF AORTIC VALVE REPLACEMENT: Primary | ICD-10-CM

## 2023-02-20 ENCOUNTER — HOSPITAL ENCOUNTER (OUTPATIENT)
Dept: CARDIOLOGY | Facility: HOSPITAL | Age: 68
Discharge: HOME | End: 2023-02-20
Attending: NURSE PRACTITIONER
Payer: MEDICARE

## 2023-02-20 VITALS — BODY MASS INDEX: 29.66 KG/M2 | WEIGHT: 219 LBS | HEIGHT: 72 IN

## 2023-02-20 DIAGNOSIS — Z95.2 HISTORY OF AORTIC VALVE REPLACEMENT: ICD-10-CM

## 2023-02-20 PROCEDURE — 93306 TTE W/DOPPLER COMPLETE: CPT | Mod: 26,,, | Performed by: INTERNAL MEDICINE

## 2023-02-20 PROCEDURE — 93306 ECHO (CUPID ONLY): ICD-10-PCS | Mod: 26,,, | Performed by: INTERNAL MEDICINE

## 2023-02-20 PROCEDURE — 93306 TTE W/DOPPLER COMPLETE: CPT

## 2023-02-21 RX ORDER — LISINOPRIL 10 MG/1
10 TABLET ORAL 2 TIMES DAILY
Qty: 180 TABLET | Refills: 3 | Status: SHIPPED | OUTPATIENT
Start: 2023-02-21 | End: 2023-07-17 | Stop reason: SDUPTHER

## 2023-02-21 RX ORDER — ESCITALOPRAM OXALATE 10 MG/1
10 TABLET ORAL DAILY
Qty: 90 TABLET | Refills: 3 | Status: SHIPPED | OUTPATIENT
Start: 2023-02-21 | End: 2023-06-26

## 2023-03-01 ENCOUNTER — OFFICE VISIT (OUTPATIENT)
Dept: CARDIOLOGY | Facility: CLINIC | Age: 68
End: 2023-03-01
Payer: MEDICARE

## 2023-03-01 VITALS
HEIGHT: 72 IN | SYSTOLIC BLOOD PRESSURE: 122 MMHG | WEIGHT: 226.63 LBS | BODY MASS INDEX: 30.7 KG/M2 | OXYGEN SATURATION: 97 % | DIASTOLIC BLOOD PRESSURE: 82 MMHG | HEART RATE: 78 BPM

## 2023-03-01 DIAGNOSIS — K30 INDIGESTION: ICD-10-CM

## 2023-03-01 DIAGNOSIS — E78.00 PURE HYPERCHOLESTEROLEMIA: ICD-10-CM

## 2023-03-01 DIAGNOSIS — I10 PRIMARY HYPERTENSION: ICD-10-CM

## 2023-03-01 DIAGNOSIS — I48.91 ATRIAL FIBRILLATION, UNSPECIFIED TYPE: Primary | ICD-10-CM

## 2023-03-01 DIAGNOSIS — I35.0 NONRHEUMATIC AORTIC VALVE STENOSIS: ICD-10-CM

## 2023-03-01 DIAGNOSIS — I25.10 CORONARY ARTERY DISEASE INVOLVING NATIVE CORONARY ARTERY OF NATIVE HEART WITHOUT ANGINA PECTORIS: ICD-10-CM

## 2023-03-01 LAB
AORTIC VALVE CUSP SEPERATION: 1.83 CM
AV INDEX (PROSTH): 0.39
AV MEAN GRADIENT: 21 MMHG
AV PEAK GRADIENT: 41 MMHG
AV VALVE AREA: 1.11 CM2
AV VELOCITY RATIO: 0.31
BSA FOR ECHO PROCEDURE: 2.25 M2
CV ECHO LV RWT: 0.49 CM
DOP CALC AO PEAK VEL: 3.2 M/S
DOP CALC AO VTI: 64.81 CM
DOP CALC LVOT AREA: 2.9 CM2
DOP CALC LVOT DIAMETER: 1.91 CM
DOP CALC LVOT PEAK VEL: 1 M/S
DOP CALC LVOT STROKE VOLUME: 72.11 CM3
DOP CALCLVOT PEAK VEL VTI: 25.18 CM
E WAVE DECELERATION TIME: 271 MSEC
ECHO LV POSTERIOR WALL: 1.11 CM (ref 0.6–1.1)
EJECTION FRACTION: 65 %
FRACTIONAL SHORTENING: 40 % (ref 28–44)
INTERVENTRICULAR SEPTUM: 1.4 CM (ref 0.6–1.1)
IVC DIAMETER: 1.5 CM
LEFT ATRIUM SIZE: 4.1 CM
LEFT INTERNAL DIMENSION IN SYSTOLE: 2.75 CM (ref 2.1–4)
LEFT VENTRICLE DIASTOLIC VOLUME INDEX: 42.97 ML/M2
LEFT VENTRICLE DIASTOLIC VOLUME: 94.96 ML
LEFT VENTRICLE MASS INDEX: 97 G/M2
LEFT VENTRICLE SYSTOLIC VOLUME INDEX: 12.8 ML/M2
LEFT VENTRICLE SYSTOLIC VOLUME: 28.25 ML
LEFT VENTRICULAR INTERNAL DIMENSION IN DIASTOLE: 4.55 CM (ref 3.5–6)
LEFT VENTRICULAR MASS: 215.01 G
LVOT MG: 1.9 MMHG
MV PEAK E VEL: 0.91 M/S
PISA TR MAX VEL: 2.34 M/S
RIGHT ATRIAL AREA: 12.07 CM2
RIGHT VENTRICULAR END-DIASTOLIC DIMENSION: 3.5 CM
TR MAX PG: 22 MMHG
TRICUSPID ANNULAR PLANE SYSTOLIC EXCURSION: 1.9 CM

## 2023-03-01 PROCEDURE — 99214 PR OFFICE/OUTPT VISIT, EST, LEVL IV, 30-39 MIN: ICD-10-PCS | Mod: S$PBB,,, | Performed by: NURSE PRACTITIONER

## 2023-03-01 PROCEDURE — 93010 ELECTROCARDIOGRAM REPORT: CPT | Mod: S$PBB,,, | Performed by: INTERNAL MEDICINE

## 2023-03-01 PROCEDURE — 99214 OFFICE O/P EST MOD 30 MIN: CPT | Mod: PBBFAC | Performed by: NURSE PRACTITIONER

## 2023-03-01 PROCEDURE — 93010 EKG 12-LEAD: ICD-10-PCS | Mod: S$PBB,,, | Performed by: INTERNAL MEDICINE

## 2023-03-01 PROCEDURE — 93005 ELECTROCARDIOGRAM TRACING: CPT | Mod: PBBFAC | Performed by: INTERNAL MEDICINE

## 2023-03-01 PROCEDURE — 99214 OFFICE O/P EST MOD 30 MIN: CPT | Mod: S$PBB,,, | Performed by: NURSE PRACTITIONER

## 2023-03-01 RX ORDER — HYDROCODONE BITARTRATE AND ACETAMINOPHEN 10; 325 MG/1; MG/1
1 TABLET ORAL EVERY 6 HOURS PRN
Qty: 120 TABLET | Refills: 0 | Status: SHIPPED | OUTPATIENT
Start: 2023-03-01 | End: 2023-03-29 | Stop reason: SDUPTHER

## 2023-03-01 NOTE — TELEPHONE ENCOUNTER
----- Message from Farida Curry sent at 3/1/2023  1:40 PM CST -----  HYDROcodone-acetaminophen (NORCO)  mg per tablet 120 tablet 0 2/1/2023   Sig: Take 1 tablet by mouth every 6 (six) hours as needed for Pain.    Escript to the [facility]

## 2023-03-02 PROBLEM — K30 INDIGESTION: Status: ACTIVE | Noted: 2023-03-02

## 2023-03-02 NOTE — ASSESSMENT & PLAN NOTE
Lipid panel reviewed from 9/30/2022   Trig 89 mg/dl  LDL 77 mg/dl  Continue Lipitor 80 mg po daily  Lipids followed by PCP

## 2023-03-02 NOTE — PROGRESS NOTES
PCP: Miguel Angel Gregory III, DO    Referring Provider:     Subjective:   Shreyas Man is a 67 y.o. male with hx of AS s/p AVR with a bioprosthetic valve, h/o post op a fib in  without recurrence on amiodarone to maintain RSR, CAD s/p CABG, HTN, and HLD,  who presents for routine follow up. He states he is doing will but does have indigestion at times. He has no exertional symptoms.         Fhx:  Family History   Problem Relation Age of Onset    Heart attack Mother     Heart attack Father     Heart disease Father      Shx:   Social History     Socioeconomic History    Marital status:    Tobacco Use    Smoking status: Former     Packs/day: 1.00     Types: Cigarettes     Quit date: 2010     Years since quittin.7    Smokeless tobacco: Never    Tobacco comments:     quit 10+ years ago   Substance and Sexual Activity    Alcohol use: Never    Drug use: Never       EKG   Results for orders placed or performed in visit on 23   EKG 12-lead    Collection Time: 23 12:51 PM    Narrative    Test Reason : I48.91,    Vent. Rate : 074 BPM     Atrial Rate : 074 BPM     P-R Int : 156 ms          QRS Dur : 082 ms      QT Int : 384 ms       P-R-T Axes : 067 033 085 degrees     QTc Int : 426 ms    Normal sinus rhythm  Nonspecific T wave abnormality  Abnormal ECG  When compared with ECG of 2022 14:00,  No significant change was found  Confirmed by Tristan Pham MD ([id number]) on 3/1/2023 2:30:34 PM    Referred By:             Confirmed By:Tristan Pham MD      ECHO Results for orders placed during the hospital encounter of 23    Echo Saline Bubble? No    Interpretation Summary  · The left ventricle is normal in size with mild concentric hypertrophy and normal systolic function.  · The estimated ejection fraction is 65%.  · Left ventricular diastolic dysfunction.  · Normal right ventricular size.  · There is a bioprosthetic aortic valve present.  · The aortic valve mean gradient is 21 mmHg  with a dimensionless index of 0.39.  · Mild mitral regurgitation.  · Mild tricuspid regurgitation.  · Mild pulmonic regurgitation.  · Mild left atrial enlargement.    [facility] Results for orders placed during the hospital encounter of 08/01/22    Cardiac catheterization    Conclusion  · The left ventricular systolic function was normal.  · The left ventricular end diastolic pressure was normal.  · The pre-procedure left ventricular end diastolic pressure was 20.  · The ejection fraction was calculated to be 60%.  · The left ventricular ejection fraction was grossly normal.  · There was no mitral valve regurgitation.  · The Dist Cx lesion was 50% stenosed.  · The Prox LAD lesion was 80% stenosed.  · The estimated blood loss was none.  · There was three vessel coronary artery disease.  · Zenia to OM is atrophiied with no sig disease in the native om.    The procedure log was documented by Documenter: RT Lena and verified by Tristan Pham MD.    Date: 8/1/2022  Time: 12:24 PM        Lab Results   Component Value Date     09/30/2022    K 4.9 09/30/2022     09/30/2022    CO2 31 09/30/2022    BUN 16 09/30/2022    CREATININE 0.95 09/30/2022    CALCIUM 10.4 (H) 09/30/2022    ANIONGAP 7 09/30/2022    EGFRNONAA 100 07/28/2022       Lab Results   Component Value Date    CHOL 149 09/30/2022    CHOL 154 10/19/2021     Lab Results   Component Value Date    HDL 54 09/30/2022    HDL 53 10/19/2021     Lab Results   Component Value Date    LDLCALC 77 09/30/2022    LDLCALC 80 10/19/2021     Lab Results   Component Value Date    TRIG 89 09/30/2022    TRIG 104 10/19/2021     Lab Results   Component Value Date    CHOLHDL 2.8 09/30/2022    CHOLHDL 2.9 10/19/2021       Lab Results   Component Value Date    WBC 6.37 09/30/2022    HGB 17.5 09/30/2022    HCT 52.4 09/30/2022    MCV 96.9 (H) 09/30/2022     09/30/2022           Current Outpatient Medications:     ALPRAZolam (XANAX) 0.25 MG tablet, Take 1 tablet (0.25  mg total) by mouth daily as needed for Anxiety., Disp: 90 tablet, Rfl: 1    amLODIPine (NORVASC) 10 MG tablet, Take 1 tablet (10 mg total) by mouth once daily., Disp: 90 tablet, Rfl: 3    aspirin (ECOTRIN) 81 MG EC tablet, Take 1 tablet (81 mg total) by mouth once daily., Disp: 90 tablet, Rfl: 3    atorvastatin (LIPITOR) 80 MG tablet, TAKE 1 TABLET DAILY, Disp: 90 tablet, Rfl: 3    EScitalopram oxalate (LEXAPRO) 10 MG tablet, Take 1 tablet (10 mg total) by mouth once daily., Disp: 90 tablet, Rfl: 3    HYDROcodone-acetaminophen (NORCO)  mg per tablet, Take 1 tablet by mouth every 6 (six) hours as needed for Pain., Disp: 120 tablet, Rfl: 0    imiquimod (ALDARA) 5 % cream, Apply topically every evening., Disp: , Rfl:     levothyroxine (SYNTHROID) 75 MCG tablet, Take 1 tablet (75 mcg total) by mouth before breakfast., Disp: 90 tablet, Rfl: 3    lisinopriL 10 MG tablet, Take 1 tablet (10 mg total) by mouth 2 (two) times daily., Disp: 180 tablet, Rfl: 3    methocarbamoL (ROBAXIN) 750 MG Tab, Take two tablets 4 times a day as needed., Disp: 240 tablet, Rfl: 1    PACERONE 200 mg Tab, TAKE 1 TABLET DAILY, Disp: 90 tablet, Rfl: 3    tadalafiL (CIALIS) 20 MG Tab, Take 20 mg by mouth every other day., Disp: , Rfl:   Meds reviewed but not reconciled. He does not bring his meds in for reconciliation.  Review of Systems   Respiratory:  Negative for shortness of breath.    Cardiovascular:  Negative for chest pain, palpitations, orthopnea, claudication, leg swelling and PND.   Gastrointestinal:  Positive for heartburn.   Neurological:  Negative for dizziness, loss of consciousness and weakness.         Objective:   /82 (BP Location: Left arm, Patient Position: Sitting)   Pulse 78   Ht 6' (1.829 m)   Wt 102.8 kg (226 lb 9.6 oz)   SpO2 97%   BMI 30.73 kg/m²     Physical Exam  Vitals and nursing note reviewed.   Constitutional:       Appearance: Normal appearance. He is obese.   HENT:      Head: Atraumatic.   Neck:       Vascular: No carotid bruit.   Cardiovascular:      Rate and Rhythm: Normal rate and regular rhythm.      Pulses: Normal pulses.      Heart sounds: Normal heart sounds.   Pulmonary:      Effort: Pulmonary effort is normal.      Breath sounds: Normal breath sounds.   Abdominal:      Palpations: Abdomen is soft.   Musculoskeletal:      Cervical back: Neck supple.      Right lower leg: No edema.      Left lower leg: No edema.   Skin:     General: Skin is warm and dry.      Capillary Refill: Capillary refill takes less than 2 seconds.   Neurological:      Mental Status: He is alert.         Assessment:     1. Atrial fibrillation, unspecified type  EKG 12-lead    EKG 12-lead      2. Nonrheumatic aortic valve stenosis        3. Indigestion        4. Pure hypercholesterolemia        5. Primary hypertension        6. Coronary artery disease involving native coronary artery of native heart without angina pectoris              Plan:   Nonrheumatic aortic valve stenosis  Asymptomatic  Continue current managment    Indigestion  Prilosec OTC  RTC 3 months  We discussed option for EST vs Lexiscan due to patient's concern over possible anginal equivalent. He declines.     Hyperlipidemia  Lipid panel reviewed from 9/30/2022   Trig 89 mg/dl  LDL 77 mg/dl  Continue Lipitor 80 mg po daily  Lipids followed by PCP    Hypertension  Well controlled 122/82 today    Coronary artery disease involving native coronary artery of native heart  Active plays golf Monday thru Friday without exertional chest pain.He does have indigestion but declines stress testing.   PPI OTC    RTC: 3 months

## 2023-03-02 NOTE — ASSESSMENT & PLAN NOTE
Prilosec OTC  RTC 3 months  We discussed option for EST vs Lexiscan due to patient's concern over possible anginal equivalent. He declines.

## 2023-03-02 NOTE — ASSESSMENT & PLAN NOTE
Active plays golf Monday thru Friday without exertional chest pain.He does have indigestion but declines stress testing.   PPI OTC

## 2023-03-29 RX ORDER — HYDROCODONE BITARTRATE AND ACETAMINOPHEN 10; 325 MG/1; MG/1
1 TABLET ORAL EVERY 6 HOURS PRN
Qty: 120 TABLET | Refills: 0 | Status: SHIPPED | OUTPATIENT
Start: 2023-03-29 | End: 2023-05-02 | Stop reason: SDUPTHER

## 2023-03-29 RX ORDER — ALPRAZOLAM 0.25 MG/1
0.25 TABLET ORAL DAILY PRN
Qty: 90 TABLET | Refills: 1 | Status: SHIPPED | OUTPATIENT
Start: 2023-03-29 | End: 2023-07-17 | Stop reason: SDUPTHER

## 2023-04-10 ENCOUNTER — OFFICE VISIT (OUTPATIENT)
Dept: PRIMARY CARE CLINIC | Facility: CLINIC | Age: 68
End: 2023-04-10
Payer: MEDICARE

## 2023-04-10 VITALS
DIASTOLIC BLOOD PRESSURE: 80 MMHG | HEIGHT: 72 IN | SYSTOLIC BLOOD PRESSURE: 120 MMHG | WEIGHT: 229 LBS | BODY MASS INDEX: 31.02 KG/M2 | OXYGEN SATURATION: 96 % | RESPIRATION RATE: 18 BRPM | HEART RATE: 88 BPM

## 2023-04-10 DIAGNOSIS — I10 PRIMARY HYPERTENSION: Primary | ICD-10-CM

## 2023-04-10 DIAGNOSIS — E78.00 PURE HYPERCHOLESTEROLEMIA: ICD-10-CM

## 2023-04-10 DIAGNOSIS — J30.1 NON-SEASONAL ALLERGIC RHINITIS DUE TO POLLEN: ICD-10-CM

## 2023-04-10 DIAGNOSIS — J02.8 ACUTE PHARYNGITIS DUE TO OTHER SPECIFIED ORGANISMS: ICD-10-CM

## 2023-04-10 PROCEDURE — 96372 THER/PROPH/DIAG INJ SC/IM: CPT | Mod: ,,, | Performed by: FAMILY MEDICINE

## 2023-04-10 PROCEDURE — 99214 PR OFFICE/OUTPT VISIT, EST, LEVL IV, 30-39 MIN: ICD-10-PCS | Mod: ,,, | Performed by: FAMILY MEDICINE

## 2023-04-10 PROCEDURE — 99214 OFFICE O/P EST MOD 30 MIN: CPT | Mod: ,,, | Performed by: FAMILY MEDICINE

## 2023-04-10 PROCEDURE — 96372 PR INJECTION,THERAP/PROPH/DIAG2ST, IM OR SUBCUT: ICD-10-PCS | Mod: ,,, | Performed by: FAMILY MEDICINE

## 2023-04-10 RX ORDER — PREDNISONE 20 MG/1
40 TABLET ORAL DAILY
Qty: 10 TABLET | Refills: 0 | Status: SHIPPED | OUTPATIENT
Start: 2023-04-10 | End: 2023-06-26

## 2023-04-10 RX ORDER — BENZONATATE 200 MG/1
200 CAPSULE ORAL 3 TIMES DAILY PRN
Qty: 30 CAPSULE | Refills: 3 | Status: SHIPPED | OUTPATIENT
Start: 2023-04-10 | End: 2023-04-20

## 2023-04-10 RX ORDER — LINCOMYCIN HYDROCHLORIDE 300 MG/ML
600 INJECTION, SOLUTION INTRAMUSCULAR; INTRAVENOUS; SUBCONJUNCTIVAL
Status: COMPLETED | OUTPATIENT
Start: 2023-04-10 | End: 2023-04-10

## 2023-04-10 RX ORDER — METHYLPREDNISOLONE ACETATE 80 MG/ML
80 INJECTION, SUSPENSION INTRA-ARTICULAR; INTRALESIONAL; INTRAMUSCULAR; SOFT TISSUE
Status: COMPLETED | OUTPATIENT
Start: 2023-04-10 | End: 2023-04-10

## 2023-04-10 RX ORDER — DEXAMETHASONE SODIUM PHOSPHATE 4 MG/ML
4 INJECTION, SOLUTION INTRA-ARTICULAR; INTRALESIONAL; INTRAMUSCULAR; INTRAVENOUS; SOFT TISSUE
Status: COMPLETED | OUTPATIENT
Start: 2023-04-10 | End: 2023-04-10

## 2023-04-10 RX ORDER — AZITHROMYCIN 250 MG/1
TABLET, FILM COATED ORAL
Qty: 6 TABLET | Refills: 1 | Status: SHIPPED | OUTPATIENT
Start: 2023-04-10 | End: 2023-04-15

## 2023-04-10 RX ADMIN — DEXAMETHASONE SODIUM PHOSPHATE 4 MG: 4 INJECTION, SOLUTION INTRA-ARTICULAR; INTRALESIONAL; INTRAMUSCULAR; INTRAVENOUS; SOFT TISSUE at 11:04

## 2023-04-10 RX ADMIN — LINCOMYCIN HYDROCHLORIDE 600 MG: 300 INJECTION, SOLUTION INTRAMUSCULAR; INTRAVENOUS; SUBCONJUNCTIVAL at 11:04

## 2023-04-10 RX ADMIN — METHYLPREDNISOLONE ACETATE 80 MG: 80 INJECTION, SUSPENSION INTRA-ARTICULAR; INTRALESIONAL; INTRAMUSCULAR; SOFT TISSUE at 11:04

## 2023-04-10 NOTE — PROGRESS NOTES
Subjective:      Patient ID: Shreyas Man is a 68 y.o. male.    Chief Complaint: Cough and congestion    Shreyas Man a 68 y.o. male presents for follow up on all regular problems which are reviewed and discussed.     Problem List Items Addressed This Visit          ENT    Acute pharyngitis due to other specified organisms    Non-seasonal allergic rhinitis due to pollen       Cardiac/Vascular    Hypertension - Primary    Hyperlipidemia       Past Medical History:  Past Medical History:   Diagnosis Date    Anemia     Arthritis     Atrial fibrillation     Cancer     Encounter for blood transfusion     GERD (gastroesophageal reflux disease)     Hyperlipidemia     Hypertension     Sleep apnea     Thyroid disease      Past Surgical History:   Procedure Laterality Date    CARDIAC CATHETERIZATION      CARDIAC VALVE SURGERY      CORONARY ARTERY BYPASS GRAFT      LEFT HEART CATHETERIZATION Left 8/1/2022    Procedure: Left heart cath;  Surgeon: Tristan Pham MD;  Location: [facility] CATH LAB;  Service: Cardiology;  Laterality: Left;  NO LV GRAM. HE HAS A BIOPROSTHETIC AO VALVE.    SPINE SURGERY       Review of patient's allergies indicates:   Allergen Reactions    Levaquin [levofloxacin]     Toradol [ketorolac]     Wasp sting [allergen ext-venom-honey bee]     Wasp venom Itching and Swelling    Zyrtec [cetirizine]     Tramadol hcl Itching     Current Outpatient Medications on File Prior to Visit   Medication Sig Dispense Refill    ALPRAZolam (XANAX) 0.25 MG tablet Take 1 tablet (0.25 mg total) by mouth daily as needed for Anxiety. 90 tablet 1    amLODIPine (NORVASC) 10 MG tablet Take 1 tablet (10 mg total) by mouth once daily. 90 tablet 3    atorvastatin (LIPITOR) 80 MG tablet TAKE 1 TABLET DAILY 90 tablet 3    EScitalopram oxalate (LEXAPRO) 10 MG tablet Take 1 tablet (10 mg total) by mouth once daily. 90 tablet 3    HYDROcodone-acetaminophen (NORCO)  mg per tablet Take 1 tablet by mouth every 6  (six) hours as needed for Pain. 120 tablet 0    imiquimod (ALDARA) 5 % cream Apply topically every evening.      levothyroxine (SYNTHROID) 75 MCG tablet Take 1 tablet (75 mcg total) by mouth before breakfast. 90 tablet 3    lisinopriL 10 MG tablet Take 1 tablet (10 mg total) by mouth 2 (two) times daily. 180 tablet 3    methocarbamoL (ROBAXIN) 750 MG Tab Take two tablets 4 times a day as needed. 240 tablet 1    PACERONE 200 mg Tab TAKE 1 TABLET DAILY 90 tablet 3    tadalafiL (CIALIS) 20 MG Tab Take 20 mg by mouth every other day.      aspirin (ECOTRIN) 81 MG EC tablet Take 1 tablet (81 mg total) by mouth once daily. 90 tablet 3     No current facility-administered medications on file prior to visit.     Social History     Socioeconomic History    Marital status:    Tobacco Use    Smoking status: Former     Packs/day: 1.00     Types: Cigarettes     Quit date: 2010     Years since quittin.8    Smokeless tobacco: Never    Tobacco comments:     quit 10+ years ago   Substance and Sexual Activity    Alcohol use: Never    Drug use: Never     Family History   Problem Relation Age of Onset    Heart attack Mother     Heart attack Father     Heart disease Father        Review of Systems   Constitutional: Negative.    HENT:  Positive for congestion, sinus pressure, sneezing and sore throat. Negative for ear pain, nosebleeds and trouble swallowing.    Eyes:  Negative for pain and itching.   Respiratory:  Positive for cough. Negative for chest tightness.    Cardiovascular:  Negative for chest pain.   Gastrointestinal:  Negative for abdominal distention.   Endocrine: Negative for cold intolerance and heat intolerance.   Genitourinary:  Negative for difficulty urinating.   Musculoskeletal:  Negative for arthralgias.   Neurological:  Negative for dizziness.     Objective:     /80 (BP Location: Right arm, Patient Position: Lying, BP Method: Large (Manual))   Pulse 88   Resp 18   Ht 6' (1.829 m)   Wt 103.9  kg (229 lb)   SpO2 96%   BMI 31.06 kg/m²     Physical Exam  Constitutional:       Appearance: Normal appearance. He is obese.   HENT:      Head: Normocephalic and atraumatic.      Right Ear: External ear normal.      Left Ear: External ear normal.      Nose: Nose normal.      Mouth/Throat:      Mouth: Mucous membranes are moist.      Pharynx: Oropharynx is clear.   Eyes:      Pupils: Pupils are equal, round, and reactive to light.   Cardiovascular:      Rate and Rhythm: Normal rate and regular rhythm.      Heart sounds: Normal heart sounds. No murmur heard.    No gallop.   Pulmonary:      Effort: Pulmonary effort is normal. No respiratory distress.      Breath sounds: Normal breath sounds. No wheezing or rales.   Abdominal:      Palpations: Abdomen is soft.   Musculoskeletal:         General: Normal range of motion.      Cervical back: Normal range of motion and neck supple.   Skin:     General: Skin is warm and dry.   Neurological:      General: No focal deficit present.      Mental Status: He is alert.   Psychiatric:         Mood and Affect: Mood normal.         Behavior: Behavior normal.         Thought Content: Thought content normal.         Judgment: Judgment normal.       1. Primary hypertension    2. Pure hypercholesterolemia    3. Acute pharyngitis due to other specified organisms    4. Non-seasonal allergic rhinitis due to pollen        Plan:     Problem List Items Addressed This Visit          ENT    Acute pharyngitis due to other specified organisms    Non-seasonal allergic rhinitis due to pollen       Cardiac/Vascular    Hypertension - Primary    Hyperlipidemia     No follow-ups on file.      I am having Shreyas Man start on azithromycin, predniSONE, and benzonatate. I am also having him maintain his atorvastatin, PACERONE, methocarbamoL, aspirin, amLODIPine, imiquimod, tadalafiL, levothyroxine, lisinopriL, EScitalopram oxalate, ALPRAZolam, and HYDROcodone-acetaminophen. We will continue to  administer dexAMETHasone, methylPREDNISolone acetate, and lincomycin.    Shreyas was seen today for cough and congestion.    Diagnoses and all orders for this visit:    Primary hypertension    Pure hypercholesterolemia    Acute pharyngitis due to other specified organisms    Non-seasonal allergic rhinitis due to pollen    Other orders  -     dexAMETHasone injection 4 mg  -     methylPREDNISolone acetate injection 80 mg  -     lincomycin injection 600 mg  -     azithromycin (Z-UMM) 250 MG tablet; Take 2 tablets by mouth on day 1; Take 1 tablet by mouth on days 2-5  -     predniSONE (DELTASONE) 20 MG tablet; Take 2 tablets (40 mg total) by mouth once daily.  -     benzonatate (TESSALON) 200 MG capsule; Take 1 capsule (200 mg total) by mouth 3 (three) times daily as needed.      Medications Ordered This Encounter   Medications    azithromycin (Z-UMM) 250 MG tablet     Sig: Take 2 tablets by mouth on day 1; Take 1 tablet by mouth on days 2-5     Dispense:  6 tablet     Refill:  1    benzonatate (TESSALON) 200 MG capsule     Sig: Take 1 capsule (200 mg total) by mouth 3 (three) times daily as needed.     Dispense:  30 capsule     Refill:  3    dexAMETHasone injection 4 mg    lincomycin injection 600 mg    methylPREDNISolone acetate injection 80 mg    predniSONE (DELTASONE) 20 MG tablet     Sig: Take 2 tablets (40 mg total) by mouth once daily.     Dispense:  10 tablet     Refill:  0     [unfilled]  No orders of the defined types were placed in this encounter.

## 2023-05-02 RX ORDER — HYDROCODONE BITARTRATE AND ACETAMINOPHEN 10; 325 MG/1; MG/1
1 TABLET ORAL EVERY 6 HOURS PRN
Qty: 120 TABLET | Refills: 0 | Status: SHIPPED | OUTPATIENT
Start: 2023-05-02 | End: 2023-05-31 | Stop reason: SDUPTHER

## 2023-05-09 DIAGNOSIS — Z71.89 COMPLEX CARE COORDINATION: ICD-10-CM

## 2023-05-31 RX ORDER — HYDROCODONE BITARTRATE AND ACETAMINOPHEN 10; 325 MG/1; MG/1
1 TABLET ORAL EVERY 6 HOURS PRN
Qty: 120 TABLET | Refills: 0 | Status: SHIPPED | OUTPATIENT
Start: 2023-05-31 | End: 2023-06-28 | Stop reason: SDUPTHER

## 2023-06-05 ENCOUNTER — OFFICE VISIT (OUTPATIENT)
Dept: CARDIOLOGY | Facility: CLINIC | Age: 68
End: 2023-06-05
Payer: MEDICARE

## 2023-06-05 VITALS
HEIGHT: 72 IN | HEART RATE: 90 BPM | OXYGEN SATURATION: 96 % | SYSTOLIC BLOOD PRESSURE: 108 MMHG | DIASTOLIC BLOOD PRESSURE: 80 MMHG | BODY MASS INDEX: 30.56 KG/M2 | WEIGHT: 225.63 LBS

## 2023-06-05 DIAGNOSIS — E78.00 PURE HYPERCHOLESTEROLEMIA: ICD-10-CM

## 2023-06-05 DIAGNOSIS — I48.0 PAROXYSMAL ATRIAL FIBRILLATION: ICD-10-CM

## 2023-06-05 DIAGNOSIS — I10 PRIMARY HYPERTENSION: ICD-10-CM

## 2023-06-05 DIAGNOSIS — I35.0 NONRHEUMATIC AORTIC VALVE STENOSIS: ICD-10-CM

## 2023-06-05 PROCEDURE — 99214 OFFICE O/P EST MOD 30 MIN: CPT | Mod: S$PBB,,, | Performed by: NURSE PRACTITIONER

## 2023-06-05 PROCEDURE — 99214 PR OFFICE/OUTPT VISIT, EST, LEVL IV, 30-39 MIN: ICD-10-PCS | Mod: S$PBB,,, | Performed by: NURSE PRACTITIONER

## 2023-06-05 PROCEDURE — 99214 OFFICE O/P EST MOD 30 MIN: CPT | Mod: PBBFAC | Performed by: NURSE PRACTITIONER

## 2023-06-05 NOTE — PROGRESS NOTES
PCP: Miguel Angel Gregory III, DO    Referring Provider:     Subjective:   Shreyas Man is a 68 y.o. male with hx of AS s/p AVR with a bioprosthetic valve, h/o post op a fib in  without recurrence on amiodarone to maintain RSR, CAD s/p CABG, HTN, and HLD,  who presents for routine follow up. He remains physically active with out issues.   3/1/2023 presents for routine follow up. He states he is doing will but does have indigestion at times. He has no exertional symptoms.         Fhx:  Family History   Problem Relation Age of Onset    Heart attack Mother     Heart attack Father     Heart disease Father      Shx:   Social History     Socioeconomic History    Marital status:    Tobacco Use    Smoking status: Former     Packs/day: 1.00     Types: Cigarettes     Quit date: 2010     Years since quittin.0    Smokeless tobacco: Never    Tobacco comments:     quit 10+ years ago   Substance and Sexual Activity    Alcohol use: Never    Drug use: Never       EKG   Results for orders placed or performed in visit on 23   EKG 12-lead    Collection Time: 23 12:51 PM    Narrative    Test Reason : I48.91,    Vent. Rate : 074 BPM     Atrial Rate : 074 BPM     P-R Int : 156 ms          QRS Dur : 082 ms      QT Int : 384 ms       P-R-T Axes : 067 033 085 degrees     QTc Int : 426 ms    Normal sinus rhythm  Nonspecific T wave abnormality  Abnormal ECG  When compared with ECG of 2022 14:00,  No significant change was found  Confirmed by Tristan Pham MD ([id number]) on 3/1/2023 2:30:34 PM    Referred By:             Confirmed By:Tristan Pham MD      ECHO Results for orders placed during the hospital encounter of 23    Echo Saline Bubble? No    Interpretation Summary  · The left ventricle is normal in size with mild concentric hypertrophy and normal systolic function.  · The estimated ejection fraction is 65%.  · Left ventricular diastolic dysfunction.  · Normal right ventricular size.  ·  There is a bioprosthetic aortic valve present.  · The aortic valve mean gradient is 21 mmHg with a dimensionless index of 0.39.  · Mild mitral regurgitation.  · Mild tricuspid regurgitation.  · Mild pulmonic regurgitation.  · Mild left atrial enlargement.    [facility] Results for orders placed during the hospital encounter of 08/01/22    Cardiac catheterization    Conclusion  · The left ventricular systolic function was normal.  · The left ventricular end diastolic pressure was normal.  · The pre-procedure left ventricular end diastolic pressure was 20.  · The ejection fraction was calculated to be 60%.  · The left ventricular ejection fraction was grossly normal.  · There was no mitral valve regurgitation.  · The Dist Cx lesion was 50% stenosed.  · The Prox LAD lesion was 80% stenosed.  · The estimated blood loss was none.  · There was three vessel coronary artery disease.  · Zenia to OM is atrophiied with no sig disease in the native om.    The procedure log was documented by Documenter: RT Lena and verified by Tristan Pham MD.    Date: 8/1/2022  Time: 12:24 PM        Lab Results   Component Value Date     09/30/2022    K 4.9 09/30/2022     09/30/2022    CO2 31 09/30/2022    BUN 16 09/30/2022    CREATININE 0.95 09/30/2022    CALCIUM 10.4 (H) 09/30/2022    ANIONGAP 7 09/30/2022    EGFRNONAA 100 07/28/2022       Lab Results   Component Value Date    CHOL 149 09/30/2022    CHOL 154 10/19/2021     Lab Results   Component Value Date    HDL 54 09/30/2022    HDL 53 10/19/2021     Lab Results   Component Value Date    LDLCALC 77 09/30/2022    LDLCALC 80 10/19/2021     Lab Results   Component Value Date    TRIG 89 09/30/2022    TRIG 104 10/19/2021     Lab Results   Component Value Date    CHOLHDL 2.8 09/30/2022    CHOLHDL 2.9 10/19/2021       Lab Results   Component Value Date    WBC 6.37 09/30/2022    HGB 17.5 09/30/2022    HCT 52.4 09/30/2022    MCV 96.9 (H) 09/30/2022     09/30/2022            Current Outpatient Medications:     ALPRAZolam (XANAX) 0.25 MG tablet, Take 1 tablet (0.25 mg total) by mouth daily as needed for Anxiety., Disp: 90 tablet, Rfl: 1    amLODIPine (NORVASC) 10 MG tablet, Take 1 tablet (10 mg total) by mouth once daily., Disp: 90 tablet, Rfl: 3    aspirin (ECOTRIN) 81 MG EC tablet, Take 1 tablet (81 mg total) by mouth once daily., Disp: 90 tablet, Rfl: 3    atorvastatin (LIPITOR) 80 MG tablet, TAKE 1 TABLET DAILY, Disp: 90 tablet, Rfl: 3    EScitalopram oxalate (LEXAPRO) 10 MG tablet, Take 1 tablet (10 mg total) by mouth once daily., Disp: 90 tablet, Rfl: 3    HYDROcodone-acetaminophen (NORCO)  mg per tablet, Take 1 tablet by mouth every 6 (six) hours as needed for Pain., Disp: 120 tablet, Rfl: 0    imiquimod (ALDARA) 5 % cream, Apply topically every evening., Disp: , Rfl:     levothyroxine (SYNTHROID) 75 MCG tablet, Take 1 tablet (75 mcg total) by mouth before breakfast., Disp: 90 tablet, Rfl: 3    lisinopriL 10 MG tablet, Take 1 tablet (10 mg total) by mouth 2 (two) times daily., Disp: 180 tablet, Rfl: 3    methocarbamoL (ROBAXIN) 750 MG Tab, Take two tablets 4 times a day as needed., Disp: 240 tablet, Rfl: 1    PACERONE 200 mg Tab, TAKE 1 TABLET DAILY, Disp: 90 tablet, Rfl: 3    predniSONE (DELTASONE) 20 MG tablet, Take 2 tablets (40 mg total) by mouth once daily., Disp: 10 tablet, Rfl: 0    tadalafiL (CIALIS) 20 MG Tab, Take 20 mg by mouth every other day., Disp: , Rfl:   Meds reviewed but not reconciled. He does not bring his meds in for reconciliation.  Review of Systems   Respiratory:  Negative for shortness of breath.    Cardiovascular:  Negative for chest pain, palpitations, orthopnea, claudication, leg swelling and PND.   Gastrointestinal:  Negative for heartburn.   Neurological:  Negative for dizziness, loss of consciousness and weakness.         Objective:   /80 (BP Location: Left arm, Patient Position: Sitting)   Pulse 90   Ht 6' (1.829 m)   Wt  102.3 kg (225 lb 9.6 oz)   SpO2 96%   BMI 30.60 kg/m²     Physical Exam  Vitals and nursing note reviewed.   Constitutional:       Appearance: Normal appearance. He is obese.   HENT:      Head: Atraumatic.   Neck:      Vascular: No carotid bruit.   Cardiovascular:      Rate and Rhythm: Normal rate and regular rhythm.      Pulses: Normal pulses.      Heart sounds: Normal heart sounds.   Pulmonary:      Effort: Pulmonary effort is normal.      Breath sounds: Normal breath sounds.   Abdominal:      Palpations: Abdomen is soft.   Musculoskeletal:      Cervical back: Neck supple.      Right lower leg: No edema.      Left lower leg: No edema.   Skin:     General: Skin is warm and dry.      Capillary Refill: Capillary refill takes less than 2 seconds.   Neurological:      Mental Status: He is alert.         Assessment:     1. Primary hypertension        2. Pure hypercholesterolemia        3. Paroxysmal atrial fibrillation        4. Nonrheumatic aortic valve stenosis              Plan:   Hypertension  108/80 mmHg    Hyperlipidemia  Lipid panel results reviewed from 9/30/2022  Trig 89 mg/dl  LDL 77 mg/dl  HDL 54 mg/dl  Lipitor 80 mg po daily  Lipids followed by PCP      Atrial fibrillation  Post op after AVR/CABG in 2010  No known recurrence  Amiodarone to maintain RSR       Nonrheumatic aortic valve stenosis  Post op after AVR/CABG in 2010  No known recurrence  Amiodarone to maintain RSR         RTC: 6 months

## 2023-06-05 NOTE — ASSESSMENT & PLAN NOTE
Lipid panel results reviewed from 9/30/2022  Trig 89 mg/dl  LDL 77 mg/dl  HDL 54 mg/dl  Lipitor 80 mg po daily  Lipids followed by PCP

## 2023-06-26 ENCOUNTER — OFFICE VISIT (OUTPATIENT)
Dept: PRIMARY CARE CLINIC | Facility: CLINIC | Age: 68
End: 2023-06-26
Payer: MEDICARE

## 2023-06-26 VITALS
WEIGHT: 227 LBS | OXYGEN SATURATION: 97 % | DIASTOLIC BLOOD PRESSURE: 82 MMHG | HEART RATE: 71 BPM | HEIGHT: 72 IN | RESPIRATION RATE: 18 BRPM | BODY MASS INDEX: 30.75 KG/M2 | SYSTOLIC BLOOD PRESSURE: 126 MMHG

## 2023-06-26 DIAGNOSIS — E78.00 PURE HYPERCHOLESTEROLEMIA: ICD-10-CM

## 2023-06-26 DIAGNOSIS — I10 PRIMARY HYPERTENSION: ICD-10-CM

## 2023-06-26 DIAGNOSIS — G47.33 OBSTRUCTIVE SLEEP APNEA SYNDROME: Primary | ICD-10-CM

## 2023-06-26 DIAGNOSIS — L23.7 POISON IVY DERMATITIS: ICD-10-CM

## 2023-06-26 DIAGNOSIS — G89.29 CHRONIC LOW BACK PAIN, UNSPECIFIED BACK PAIN LATERALITY, UNSPECIFIED WHETHER SCIATICA PRESENT: ICD-10-CM

## 2023-06-26 DIAGNOSIS — M54.50 CHRONIC LOW BACK PAIN, UNSPECIFIED BACK PAIN LATERALITY, UNSPECIFIED WHETHER SCIATICA PRESENT: ICD-10-CM

## 2023-06-26 DIAGNOSIS — I48.0 PAROXYSMAL ATRIAL FIBRILLATION: ICD-10-CM

## 2023-06-26 PROCEDURE — 96372 PR INJECTION,THERAP/PROPH/DIAG2ST, IM OR SUBCUT: ICD-10-PCS | Mod: ,,, | Performed by: FAMILY MEDICINE

## 2023-06-26 PROCEDURE — 99214 OFFICE O/P EST MOD 30 MIN: CPT | Mod: ,,, | Performed by: FAMILY MEDICINE

## 2023-06-26 PROCEDURE — 96372 THER/PROPH/DIAG INJ SC/IM: CPT | Mod: ,,, | Performed by: FAMILY MEDICINE

## 2023-06-26 PROCEDURE — 99214 PR OFFICE/OUTPT VISIT, EST, LEVL IV, 30-39 MIN: ICD-10-PCS | Mod: ,,, | Performed by: FAMILY MEDICINE

## 2023-06-26 RX ORDER — PREDNISONE 20 MG/1
40 TABLET ORAL DAILY
Qty: 10 TABLET | Refills: 0 | Status: SHIPPED | OUTPATIENT
Start: 2023-06-26 | End: 2023-10-02 | Stop reason: ALTCHOICE

## 2023-06-26 RX ORDER — METHYLPREDNISOLONE ACETATE 80 MG/ML
80 INJECTION, SUSPENSION INTRA-ARTICULAR; INTRALESIONAL; INTRAMUSCULAR; SOFT TISSUE
Status: COMPLETED | OUTPATIENT
Start: 2023-06-26 | End: 2023-06-26

## 2023-06-26 RX ORDER — DEXAMETHASONE SODIUM PHOSPHATE 4 MG/ML
4 INJECTION, SOLUTION INTRA-ARTICULAR; INTRALESIONAL; INTRAMUSCULAR; INTRAVENOUS; SOFT TISSUE
Status: COMPLETED | OUTPATIENT
Start: 2023-06-26 | End: 2023-06-26

## 2023-06-26 RX ADMIN — DEXAMETHASONE SODIUM PHOSPHATE 4 MG: 4 INJECTION, SOLUTION INTRA-ARTICULAR; INTRALESIONAL; INTRAMUSCULAR; INTRAVENOUS; SOFT TISSUE at 02:06

## 2023-06-26 RX ADMIN — METHYLPREDNISOLONE ACETATE 80 MG: 80 INJECTION, SUSPENSION INTRA-ARTICULAR; INTRALESIONAL; INTRAMUSCULAR; SOFT TISSUE at 02:06

## 2023-06-26 NOTE — PROGRESS NOTES
Subjective:      Patient ID: Shreyas Man is a 68 y.o. male.    Chief Complaint: Poison Monisha    Shreyas Man a 68 y.o. male presents for follow up on all regular problems which are reviewed and discussed.     Problem List Items Addressed This Visit          Cardiac/Vascular    Hypertension    Hyperlipidemia    Atrial fibrillation    Overview     Post op after AVR/CABG in 2010  No known recurrence  Amiodarone to maintain RSR             Orthopedic    Low back pain       Other    Sleep apnea - Primary    Poison ivy dermatitis       Past Medical History:  Past Medical History:   Diagnosis Date    Anemia     Arthritis     Atrial fibrillation     Cancer     Encounter for blood transfusion     GERD (gastroesophageal reflux disease)     Hyperlipidemia     Hypertension     Sleep apnea     Thyroid disease      Past Surgical History:   Procedure Laterality Date    CARDIAC CATHETERIZATION      CARDIAC VALVE SURGERY      CORONARY ARTERY BYPASS GRAFT      LEFT HEART CATHETERIZATION Left 8/1/2022    Procedure: Left heart cath;  Surgeon: Tristan Pham MD;  Location: [facility] CATH LAB;  Service: Cardiology;  Laterality: Left;  NO LV GRAM. HE HAS A BIOPROSTHETIC AO VALVE.    SPINE SURGERY       Review of patient's allergies indicates:   Allergen Reactions    Levaquin [levofloxacin]     Toradol [ketorolac]     Wasp sting [allergen ext-venom-honey bee]     Wasp venom Itching and Swelling    Zyrtec [cetirizine]     Tramadol hcl Itching     Current Outpatient Medications on File Prior to Visit   Medication Sig Dispense Refill    ALPRAZolam (XANAX) 0.25 MG tablet Take 1 tablet (0.25 mg total) by mouth daily as needed for Anxiety. 90 tablet 1    amLODIPine (NORVASC) 10 MG tablet Take 1 tablet (10 mg total) by mouth once daily. 90 tablet 3    aspirin (ECOTRIN) 81 MG EC tablet Take 1 tablet (81 mg total) by mouth once daily. 90 tablet 3    atorvastatin (LIPITOR) 80 MG tablet TAKE 1 TABLET DAILY 90 tablet 3     HYDROcodone-acetaminophen (NORCO)  mg per tablet Take 1 tablet by mouth every 6 (six) hours as needed for Pain. 120 tablet 0    imiquimod (ALDARA) 5 % cream Apply topically every evening.      levothyroxine (SYNTHROID) 75 MCG tablet Take 1 tablet (75 mcg total) by mouth before breakfast. 90 tablet 3    lisinopriL 10 MG tablet Take 1 tablet (10 mg total) by mouth 2 (two) times daily. 180 tablet 3    methocarbamoL (ROBAXIN) 750 MG Tab Take two tablets 4 times a day as needed. 240 tablet 1    PACERONE 200 mg Tab TAKE 1 TABLET DAILY 90 tablet 3    tadalafiL (CIALIS) 20 MG Tab Take 20 mg by mouth every other day.      [DISCONTINUED] predniSONE (DELTASONE) 20 MG tablet Take 2 tablets (40 mg total) by mouth once daily. 10 tablet 0    [DISCONTINUED] EScitalopram oxalate (LEXAPRO) 10 MG tablet Take 1 tablet (10 mg total) by mouth once daily. 90 tablet 3     No current facility-administered medications on file prior to visit.     Social History     Socioeconomic History    Marital status:    Tobacco Use    Smoking status: Former     Packs/day: 1.00     Types: Cigarettes     Quit date: 2010     Years since quittin.0    Smokeless tobacco: Never    Tobacco comments:     quit 10+ years ago   Substance and Sexual Activity    Alcohol use: Never    Drug use: Never     Family History   Problem Relation Age of Onset    Heart attack Mother     Heart attack Father     Heart disease Father        Review of Systems   Constitutional: Negative.    HENT:  Negative for congestion, ear pain, nosebleeds and trouble swallowing.    Eyes:  Negative for pain and itching.   Respiratory:  Negative for chest tightness.    Cardiovascular:  Negative for chest pain.   Gastrointestinal:  Negative for abdominal distention.   Endocrine: Negative for cold intolerance and heat intolerance.   Genitourinary:  Negative for difficulty urinating.   Musculoskeletal:  Positive for arthralgias and myalgias.   Skin:  Positive for color change  and rash.   Neurological:  Negative for dizziness.     Objective:     /82 (BP Location: Left arm, Patient Position: Sitting, BP Method: Large (Manual))   Pulse 71   Resp 18   Ht 6' (1.829 m)   Wt 103 kg (227 lb)   SpO2 97%   BMI 30.79 kg/m²     Physical Exam  Constitutional:       Appearance: Normal appearance. He is obese.   HENT:      Head: Normocephalic and atraumatic.      Right Ear: External ear normal.      Left Ear: External ear normal.      Nose: Nose normal.      Mouth/Throat:      Mouth: Mucous membranes are moist.      Pharynx: Oropharynx is clear.   Eyes:      Pupils: Pupils are equal, round, and reactive to light.   Cardiovascular:      Rate and Rhythm: Normal rate and regular rhythm.      Heart sounds: Normal heart sounds.   Pulmonary:      Effort: Pulmonary effort is normal. No respiratory distress.      Breath sounds: Normal breath sounds. No wheezing or rales.   Abdominal:      Palpations: Abdomen is soft.   Musculoskeletal:         General: Normal range of motion.      Cervical back: Normal range of motion and neck supple.   Skin:     General: Skin is warm and dry.      Findings: Erythema, lesion and rash present.   Neurological:      General: No focal deficit present.      Mental Status: He is alert.   Psychiatric:         Mood and Affect: Mood normal.         Behavior: Behavior normal.         Thought Content: Thought content normal.         Judgment: Judgment normal.       1. Obstructive sleep apnea syndrome    2. Poison ivy dermatitis    3. Chronic low back pain, unspecified back pain laterality, unspecified whether sciatica present    4. Primary hypertension    5. Pure hypercholesterolemia    6. Paroxysmal atrial fibrillation        Plan:     Problem List Items Addressed This Visit          Cardiac/Vascular    Hypertension    Hyperlipidemia    Atrial fibrillation       Orthopedic    Low back pain       Other    Sleep apnea - Primary    Poison ivy dermatitis     No follow-ups on  file.  Shot,rx, ed.    I am having Shreyas Man start on ITCH-X and predniSONE. I am also having him maintain his atorvastatin, PACERONE, methocarbamoL, aspirin, amLODIPine, imiquimod, tadalafiL, levothyroxine, lisinopriL, ALPRAZolam, and HYDROcodone-acetaminophen. We administered dexAMETHasone and methylPREDNISolone acetate.    Shreyas was seen today for poison ivy.    Diagnoses and all orders for this visit:    Obstructive sleep apnea syndrome    Poison ivy dermatitis    Chronic low back pain, unspecified back pain laterality, unspecified whether sciatica present    Primary hypertension    Pure hypercholesterolemia    Paroxysmal atrial fibrillation    Other orders  -     dexAMETHasone injection 4 mg  -     methylPREDNISolone acetate injection 80 mg  -     pramoxine-benzyl alcohol (ITCH-X) 1-10 % Spry; Apply 1 spray topically 4 (four) times daily as needed.  -     predniSONE (DELTASONE) 20 MG tablet; Take 2 tablets (40 mg total) by mouth once daily.      Medications Ordered This Encounter   Medications    dexAMETHasone injection 4 mg    methylPREDNISolone acetate injection 80 mg    pramoxine-benzyl alcohol (ITCH-X) 1-10 % Spry     Sig: Apply 1 spray topically 4 (four) times daily as needed.     Dispense:  59.1 mL     Refill:  5    predniSONE (DELTASONE) 20 MG tablet     Sig: Take 2 tablets (40 mg total) by mouth once daily.     Dispense:  10 tablet     Refill:  0     [unfilled]  No orders of the defined types were placed in this encounter.

## 2023-06-28 RX ORDER — HYDROCODONE BITARTRATE AND ACETAMINOPHEN 10; 325 MG/1; MG/1
1 TABLET ORAL EVERY 6 HOURS PRN
Qty: 120 TABLET | Refills: 0 | Status: SHIPPED | OUTPATIENT
Start: 2023-06-28 | End: 2023-07-05 | Stop reason: SDUPTHER

## 2023-07-18 RX ORDER — LISINOPRIL 10 MG/1
10 TABLET ORAL 2 TIMES DAILY
Qty: 180 TABLET | Refills: 3 | Status: SHIPPED | OUTPATIENT
Start: 2023-07-18 | End: 2024-03-19 | Stop reason: SDUPTHER

## 2023-07-18 RX ORDER — ALPRAZOLAM 0.25 MG/1
0.25 TABLET ORAL DAILY PRN
Qty: 90 TABLET | Refills: 3 | Status: SHIPPED | OUTPATIENT
Start: 2023-07-18 | End: 2023-08-28 | Stop reason: SDUPTHER

## 2023-07-18 RX ORDER — AMLODIPINE BESYLATE 10 MG/1
10 TABLET ORAL DAILY
Qty: 90 TABLET | Refills: 3 | Status: SHIPPED | OUTPATIENT
Start: 2023-07-18 | End: 2024-02-27 | Stop reason: SDUPTHER

## 2023-07-25 RX ORDER — HYDROCODONE BITARTRATE AND ACETAMINOPHEN 10; 325 MG/1; MG/1
1 TABLET ORAL EVERY 6 HOURS PRN
Qty: 120 TABLET | Refills: 0 | Status: SHIPPED | OUTPATIENT
Start: 2023-07-25 | End: 2023-07-31 | Stop reason: SDUPTHER

## 2023-07-31 RX ORDER — HYDROCODONE BITARTRATE AND ACETAMINOPHEN 10; 325 MG/1; MG/1
1 TABLET ORAL EVERY 6 HOURS PRN
Qty: 120 TABLET | Refills: 0 | Status: SHIPPED | OUTPATIENT
Start: 2023-07-31 | End: 2023-08-31 | Stop reason: SDUPTHER

## 2023-08-07 DIAGNOSIS — C43.9 MALIGNANT MELANOMA, UNSPECIFIED SITE: Primary | ICD-10-CM

## 2023-08-08 ENCOUNTER — HOSPITAL ENCOUNTER (OUTPATIENT)
Dept: RADIOLOGY | Facility: HOSPITAL | Age: 68
Discharge: HOME | End: 2023-08-08
Attending: FAMILY MEDICINE
Payer: MEDICARE

## 2023-08-08 DIAGNOSIS — C43.9 MALIGNANT MELANOMA, UNSPECIFIED SITE: ICD-10-CM

## 2023-08-08 PROCEDURE — 71046 XR CHEST PA AND LATERAL: ICD-10-PCS | Mod: 26,,, | Performed by: RADIOLOGY

## 2023-08-08 PROCEDURE — 71046 X-RAY EXAM CHEST 2 VIEWS: CPT | Mod: TC

## 2023-08-08 PROCEDURE — 71046 X-RAY EXAM CHEST 2 VIEWS: CPT | Mod: 26,,, | Performed by: RADIOLOGY

## 2023-08-10 PROCEDURE — 88305 PATHOLOGY, DERMATOLOGY: ICD-10-PCS | Mod: 26,,, | Performed by: PATHOLOGY

## 2023-08-10 PROCEDURE — 88305 TISSUE EXAM BY PATHOLOGIST: CPT | Mod: 26,,, | Performed by: PATHOLOGY

## 2023-08-10 PROCEDURE — 88305 TISSUE EXAM BY PATHOLOGIST: CPT | Mod: TC,59,SUR | Performed by: DERMATOLOGY

## 2023-08-11 ENCOUNTER — LAB REQUISITION (OUTPATIENT)
Dept: LAB | Facility: HOSPITAL | Age: 68
End: 2023-08-11
Attending: DERMATOLOGY
Payer: MEDICARE

## 2023-08-11 DIAGNOSIS — D49.2 NEOPLASM OF UNSPECIFIED BEHAVIOR OF BONE, SOFT TISSUE, AND SKIN: ICD-10-CM

## 2023-08-14 LAB
ESTROGEN SERPL-MCNC: NORMAL PG/ML
INSULIN SERPL-ACNC: NORMAL U[IU]/ML
LAB AP GROSS DESCRIPTION: NORMAL
LAB AP LABORATORY NOTES: NORMAL
LAB AP SPEC A DDX: NORMAL
LAB AP SPEC A MORPHOLOGY: NORMAL
LAB AP SPEC A PROCEDURE: NORMAL
LAB AP SPEC B DDX: NORMAL
LAB AP SPEC B MORPHOLOGY: NORMAL
LAB AP SPEC B PROCEDURE: NORMAL
T3RU NFR SERPL: NORMAL %

## 2023-08-28 RX ORDER — ALPRAZOLAM 0.25 MG/1
0.25 TABLET ORAL DAILY PRN
Qty: 90 TABLET | Refills: 3 | Status: SHIPPED | OUTPATIENT
Start: 2023-08-28 | End: 2023-11-13 | Stop reason: SDUPTHER

## 2023-08-31 RX ORDER — HYDROCODONE BITARTRATE AND ACETAMINOPHEN 10; 325 MG/1; MG/1
1 TABLET ORAL EVERY 6 HOURS PRN
Qty: 120 TABLET | Refills: 0 | Status: SHIPPED | OUTPATIENT
Start: 2023-08-31 | End: 2023-10-02 | Stop reason: SDUPTHER

## 2023-09-18 ENCOUNTER — HOSPITAL ENCOUNTER (EMERGENCY)
Facility: HOSPITAL | Age: 68
Discharge: HOME | End: 2023-09-18
Payer: MEDICARE

## 2023-09-18 VITALS
RESPIRATION RATE: 19 BRPM | SYSTOLIC BLOOD PRESSURE: 150 MMHG | HEART RATE: 78 BPM | TEMPERATURE: 97 F | BODY MASS INDEX: 29.8 KG/M2 | WEIGHT: 220 LBS | HEIGHT: 72 IN | DIASTOLIC BLOOD PRESSURE: 88 MMHG | OXYGEN SATURATION: 96 %

## 2023-09-18 DIAGNOSIS — R10.13 EPIGASTRIC PAIN: ICD-10-CM

## 2023-09-18 DIAGNOSIS — N20.0 RIGHT NEPHROLITHIASIS: ICD-10-CM

## 2023-09-18 DIAGNOSIS — I71.40 ABDOMINAL AORTIC ANEURYSM (AAA): ICD-10-CM

## 2023-09-18 DIAGNOSIS — K21.9 GASTROESOPHAGEAL REFLUX DISEASE, UNSPECIFIED WHETHER ESOPHAGITIS PRESENT: Primary | ICD-10-CM

## 2023-09-18 LAB
ALBUMIN SERPL BCP-MCNC: 4.1 G/DL (ref 3.5–5)
ALBUMIN/GLOB SERPL: 1.4 {RATIO}
ALP SERPL-CCNC: 91 U/L (ref 45–115)
ALT SERPL W P-5'-P-CCNC: 13 U/L (ref 16–61)
ANION GAP SERPL CALCULATED.3IONS-SCNC: 7 MMOL/L (ref 7–16)
AST SERPL W P-5'-P-CCNC: 24 U/L (ref 15–37)
BACTERIA #/AREA URNS HPF: ABNORMAL /HPF
BASOPHILS # BLD AUTO: 0.03 K/UL (ref 0–0.2)
BASOPHILS NFR BLD AUTO: 0.4 % (ref 0–1)
BILIRUB SERPL-MCNC: 1.3 MG/DL (ref ?–1.2)
BILIRUB UR QL STRIP: NEGATIVE
BUN SERPL-MCNC: 20 MG/DL (ref 7–18)
BUN/CREAT SERPL: 17 (ref 6–20)
CALCIUM SERPL-MCNC: 10.4 MG/DL (ref 8.5–10.1)
CHLORIDE SERPL-SCNC: 106 MMOL/L (ref 98–107)
CLARITY UR: ABNORMAL
CO2 SERPL-SCNC: 32 MMOL/L (ref 21–32)
COLOR UR: YELLOW
CREAT SERPL-MCNC: 1.21 MG/DL (ref 0.7–1.3)
DIFFERENTIAL METHOD BLD: ABNORMAL
EGFR (NO RACE VARIABLE) (RUSH/TITUS): 66 ML/MIN/1.73M2
EOSINOPHIL # BLD AUTO: 0.05 K/UL (ref 0–0.5)
EOSINOPHIL NFR BLD AUTO: 0.6 % (ref 1–4)
ERYTHROCYTE [DISTWIDTH] IN BLOOD BY AUTOMATED COUNT: 12.7 % (ref 11.5–14.5)
GLOBULIN SER-MCNC: 2.9 G/DL (ref 2–4)
GLUCOSE SERPL-MCNC: 124 MG/DL (ref 74–106)
GLUCOSE UR STRIP-MCNC: 70 MG/DL
HCT VFR BLD AUTO: 46.1 % (ref 40–54)
HGB BLD-MCNC: 16.2 G/DL (ref 13.5–18)
IMM GRANULOCYTES # BLD AUTO: 0.03 K/UL (ref 0–0.04)
IMM GRANULOCYTES NFR BLD: 0.4 % (ref 0–0.4)
KETONES UR STRIP-SCNC: NEGATIVE MG/DL
LEUKOCYTE ESTERASE UR QL STRIP: NEGATIVE
LIPASE SERPL-CCNC: 38 U/L (ref 73–393)
LYMPHOCYTES # BLD AUTO: 1.73 K/UL (ref 1–4.8)
LYMPHOCYTES NFR BLD AUTO: 22.2 % (ref 27–41)
MCH RBC QN AUTO: 33.2 PG (ref 27–31)
MCHC RBC AUTO-ENTMCNC: 35.1 G/DL (ref 32–36)
MCV RBC AUTO: 94.5 FL (ref 80–96)
MONOCYTES # BLD AUTO: 0.57 K/UL (ref 0–0.8)
MONOCYTES NFR BLD AUTO: 7.3 % (ref 2–6)
MPC BLD CALC-MCNC: 9.5 FL (ref 9.4–12.4)
MUCOUS, UA: ABNORMAL /LPF
NEUTROPHILS # BLD AUTO: 5.37 K/UL (ref 1.8–7.7)
NEUTROPHILS NFR BLD AUTO: 69.1 % (ref 53–65)
NITRITE UR QL STRIP: NEGATIVE
NRBC # BLD AUTO: 0 X10E3/UL
NRBC, AUTO (.00): 0 %
PH UR STRIP: 6 PH UNITS
PLATELET # BLD AUTO: 195 K/UL (ref 150–400)
POTASSIUM SERPL-SCNC: 4.1 MMOL/L (ref 3.5–5.1)
PROT SERPL-MCNC: 7 G/DL (ref 6.4–8.2)
PROT UR QL STRIP: 100
RBC # BLD AUTO: 4.88 M/UL (ref 4.6–6.2)
RBC # UR STRIP: ABNORMAL /UL
RBC #/AREA URNS HPF: 150 /HPF
SODIUM SERPL-SCNC: 141 MMOL/L (ref 136–145)
SP GR UR STRIP: 1.03
UROBILINOGEN UR STRIP-ACNC: NORMAL MG/DL
WBC # BLD AUTO: 7.78 K/UL (ref 4.5–11)
WBC #/AREA URNS HPF: 4 /HPF

## 2023-09-18 PROCEDURE — 25000003 PHARM REV CODE 250: Performed by: NURSE PRACTITIONER

## 2023-09-18 PROCEDURE — 83690 ASSAY OF LIPASE: CPT | Performed by: NURSE PRACTITIONER

## 2023-09-18 PROCEDURE — 99284 PR EMERGENCY DEPT VISIT,LEVEL IV: ICD-10-PCS | Mod: ,,, | Performed by: NURSE PRACTITIONER

## 2023-09-18 PROCEDURE — 93005 ELECTROCARDIOGRAM TRACING: CPT

## 2023-09-18 PROCEDURE — 99284 EMERGENCY DEPT VISIT MOD MDM: CPT | Mod: ,,, | Performed by: NURSE PRACTITIONER

## 2023-09-18 PROCEDURE — 80053 COMPREHEN METABOLIC PANEL: CPT | Performed by: NURSE PRACTITIONER

## 2023-09-18 PROCEDURE — 81001 URINALYSIS AUTO W/SCOPE: CPT | Performed by: NURSE PRACTITIONER

## 2023-09-18 PROCEDURE — 93010 EKG 12-LEAD: ICD-10-PCS | Mod: ,,, | Performed by: HOSPITALIST

## 2023-09-18 PROCEDURE — 93010 ELECTROCARDIOGRAM REPORT: CPT | Mod: ,,, | Performed by: HOSPITALIST

## 2023-09-18 PROCEDURE — 25500020 PHARM REV CODE 255: Performed by: NURSE PRACTITIONER

## 2023-09-18 PROCEDURE — 85025 COMPLETE CBC W/AUTO DIFF WBC: CPT | Performed by: NURSE PRACTITIONER

## 2023-09-18 PROCEDURE — 99285 EMERGENCY DEPT VISIT HI MDM: CPT | Mod: 25

## 2023-09-18 RX ORDER — MAG HYDROX/ALUMINUM HYD/SIMETH 200-200-20
30 SUSPENSION, ORAL (FINAL DOSE FORM) ORAL ONCE
Status: COMPLETED | OUTPATIENT
Start: 2023-09-18 | End: 2023-09-18

## 2023-09-18 RX ORDER — LIDOCAINE HYDROCHLORIDE 20 MG/ML
15 SOLUTION OROPHARYNGEAL ONCE
Status: COMPLETED | OUTPATIENT
Start: 2023-09-18 | End: 2023-09-18

## 2023-09-18 RX ADMIN — LIDOCAINE HYDROCHLORIDE 15 ML: 20 SOLUTION ORAL at 08:09

## 2023-09-18 RX ADMIN — IOPAMIDOL 100 ML: 755 INJECTION, SOLUTION INTRAVENOUS at 09:09

## 2023-09-18 RX ADMIN — ALUMINUM HYDROXIDE, MAGNESIUM HYDROXIDE, AND DIMETHICONE 30 ML: 200; 20; 200 SUSPENSION ORAL at 08:09

## 2023-09-19 NOTE — ED PROVIDER NOTES
"Encounter Date: 9/18/2023       History     Chief Complaint   Patient presents with    Abdominal Pain     Lower abdominal pain and swelling.     67 y/o WM presents to the emergency department with c/o abd pain that started about an hour ago. He reports he had spaghetti for supper and began to have discomfort. He felt like he needed to belch so he got up and took the trash to the street and felt a little better. He reports shortly after his pain began to get worse again and he feels bloated. He states he came to the emergency department d/t unrelieved pain that has progressively worsened. He does state he has belched 2-3 times since he has got to his room and he does feel "a little better but still bloated". He denies nausea, vomiting, diarrhea or constipation. He reports his last BM was today around 3PM and it was normal for him. He denies melena, hematochezia. No recent illness, no fevers or chills. There are no known exacerbating or remitting factors.     The history is provided by the patient.     Review of patient's allergies indicates:   Allergen Reactions    Levaquin [levofloxacin]     Toradol [ketorolac]     Wasp sting [allergen ext-venom-honey bee]     Wasp venom Itching and Swelling    Zyrtec [cetirizine]     Tramadol hcl Itching     Past Medical History:   Diagnosis Date    Anemia     Arthritis     Atrial fibrillation     Cancer     Encounter for blood transfusion     GERD (gastroesophageal reflux disease)     Hyperlipidemia     Hypertension     Sleep apnea     Thyroid disease      Past Surgical History:   Procedure Laterality Date    CARDIAC CATHETERIZATION      CARDIAC VALVE SURGERY      CORONARY ARTERY BYPASS GRAFT      LEFT HEART CATHETERIZATION Left 8/1/2022    Procedure: Left heart cath;  Surgeon: Tristan Pham MD;  Location: [facility] CATH LAB;  Service: Cardiology;  Laterality: Left;  NO LV GRAM. HE HAS A BIOPROSTHETIC AO VALVE.    SPINE SURGERY       Family History   Problem Relation Age of " Onset    Heart attack Mother     Heart attack Father     Heart disease Father      Social History     Tobacco Use    Smoking status: Former     Current packs/day: 0.00     Types: Cigarettes     Quit date: 2010     Years since quittin.3    Smokeless tobacco: Never    Tobacco comments:     quit 10+ years ago   Substance Use Topics    Alcohol use: Never    Drug use: Never     Review of Systems   All other systems reviewed and are negative.      Physical Exam     Initial Vitals [23 1940]   BP Pulse Resp Temp SpO2   (!) 166/96 62 (!) 22 97.4 °F (36.3 °C) 98 %      MAP       --         Physical Exam    Constitutional: He appears well-developed and well-nourished. He is active and cooperative.   Cardiovascular:  Normal rate, regular rhythm, normal heart sounds and normal pulses.           Pulmonary/Chest: Effort normal and breath sounds normal.   Abdominal: Abdomen is soft. Bowel sounds are normal. He exhibits distension (mild, no rigidity). There is generalized abdominal tenderness. There is no rebound and no guarding.     Neurological: He is alert and oriented to person, place, and time.   Skin: Skin is warm, dry and intact. Capillary refill takes less than 2 seconds.   Psychiatric: He has a normal mood and affect. His speech is normal and behavior is normal. Judgment and thought content normal. Cognition and memory are normal.         Medical Screening Exam   See Full Note    ED Course   Procedures  Labs Reviewed   COMPREHENSIVE METABOLIC PANEL - Abnormal; Notable for the following components:       Result Value    Glucose 124 (*)     BUN 20 (*)     Calcium 10.4 (*)     Bilirubin, Total 1.3 (*)     ALT 13 (*)     All other components within normal limits   URINALYSIS, REFLEX TO URINE CULTURE - Abnormal; Notable for the following components:    Protein,  (*)     Glucose, UA 70 (*)     Blood, UA Large (*)     All other components within normal limits   LIPASE - Abnormal; Notable for the following  components:    Lipase 38 (*)     All other components within normal limits   CBC WITH DIFFERENTIAL - Abnormal; Notable for the following components:    MCH 33.2 (*)     Neutrophils % 69.1 (*)     Lymphocytes % 22.2 (*)     Monocytes % 7.3 (*)     Eosinophils % 0.6 (*)     All other components within normal limits   URINALYSIS, MICROSCOPIC - Abnormal; Notable for the following components:    RBC,  (*)     Bacteria, UA Occasional (*)     Mucous Many (*)     All other components within normal limits   CBC W/ AUTO DIFFERENTIAL    Narrative:     The following orders were created for panel order CBC auto differential.  Procedure                               Abnormality         Status                     ---------                               -----------         ------                     CBC with Differential[[id number]]       Abnormal            Final result                 Please view results for these tests on the individual orders.   EXTRA TUBES    Narrative:     The following orders were created for panel order EXTRA TUBES.  Procedure                               Abnormality         Status                     ---------                               -----------         ------                     Light Blue Top Hold[[id number]]                             In process                 Light Green Top Hold[[id number]]                            In process                 Gold Top Hold[[id number]]                                   In process                   Please view results for these tests on the individual orders.   LIGHT BLUE TOP HOLD   LIGHT GREEN TOP HOLD   GOLD TOP HOLD     EKG Readings: (Independently Interpreted)   Initial Reading: No STEMI. Rhythm: Normal Sinus Rhythm.       Imaging Results              CT Abdomen Pelvis With Contrast (In process)                      X-Ray KUB (Final result)  Result time 09/18/23 20:29:55      Final result by Micky Brooks MD (09/18/23 20:29:55)                    "Impression:      Mild colonic stool.      Electronically signed by: Micky Brooks  Date:    09/18/2023  Time:    20:29               Narrative:    EXAMINATION:  XR KUB    CLINICAL HISTORY:  abdominal pain;    TECHNIQUE:  Single AP supine view of the abdomen (KUB) was performed    COMPARISON:  09/22/2014    FINDINGS:  Mild colonic stool.  No bowel obstruction.  No abnormal calcification.  Surgical clips right upper quadrant.  Lung bases are clear.  Sternotomy changes are seen.                                       Medications   aluminum-magnesium hydroxide-simethicone 200-200-20 mg/5 mL suspension 30 mL (30 mLs Oral Given 9/18/23 2016)     And   LIDOcaine HCl 2% oral solution 15 mL (15 mLs Oral Given 9/18/23 2015)   iopamidoL (ISOVUE-370) injection 100 mL (100 mLs Intravenous Given 9/18/23 2121)     Medical Decision Making  69 y/o WM presents to the emergency department with c/o abd pain that started about an hour ago. He reports he had spaghetti for supper and began to have discomfort. He felt like he needed to belch so he got up and took the trash to the street and felt a little better. He reports shortly after his pain began to get worse again and he feels bloated. He states he came to the emergency department d/t unrelieved pain that has progressively worsened. He does state he has belched 2-3 times since he has got to his room and he does feel "a little better but still bloated". He denies nausea, vomiting, diarrhea or constipation. He reports his last BM was today around 3PM and it was normal for him. He denies melena, hematochezia. No recent illness, no fevers or chills. There are no known exacerbating or remitting factors.     Problems Addressed:  Gastroesophageal reflux disease, unspecified whether esophagitis present: acute illness or injury with systemic symptoms     Details: Patient given GI Cocktail with relief of symptoms. Counseled on supportive measures. Follow up instructions given. Warning s/s discussed " "and return precautions given; the patient has v/u.    Right nephrolithiasis: acute illness or injury     Details: CT revealed "tiny" right sided nephrolithiasis. Pt states he has had a kidney stone before and he believes the pain was related to his GERD. He has had complete resolution of his symptoms while here in the emergency department.     Amount and/or Complexity of Data Reviewed  Labs: ordered.  Radiology: ordered.    Risk  OTC drugs.  Prescription drug management.                               Clinical Impression:   Final diagnoses:  [R10.13] Epigastric pain  [K21.9] Gastroesophageal reflux disease, unspecified whether esophagitis present (Primary)  [N20.0] Right nephrolithiasis        ED Disposition Condition    Discharge Stable          ED Prescriptions    None       Follow-up Information       Follow up With Specialties Details Why Contact Info    Primary Care Provider   As needed              Natalie Montes FNP  09/18/23 [id number]    "

## 2023-09-20 ENCOUNTER — TELEPHONE (OUTPATIENT)
Dept: EMERGENCY MEDICINE | Facility: HOSPITAL | Age: 68
End: 2023-09-20
Payer: MEDICARE

## 2023-10-02 ENCOUNTER — OFFICE VISIT (OUTPATIENT)
Dept: PRIMARY CARE CLINIC | Facility: CLINIC | Age: 68
End: 2023-10-02
Payer: MEDICARE

## 2023-10-02 VITALS
RESPIRATION RATE: 18 BRPM | WEIGHT: 225 LBS | SYSTOLIC BLOOD PRESSURE: 120 MMHG | HEIGHT: 72 IN | BODY MASS INDEX: 30.48 KG/M2 | DIASTOLIC BLOOD PRESSURE: 74 MMHG | OXYGEN SATURATION: 98 % | HEART RATE: 87 BPM

## 2023-10-02 DIAGNOSIS — E83.10 DISORDER OF IRON METABOLISM, UNSPECIFIED: ICD-10-CM

## 2023-10-02 DIAGNOSIS — D52.0 DIETARY FOLATE DEFICIENCY ANEMIA: ICD-10-CM

## 2023-10-02 DIAGNOSIS — M60.9 MYOSITIS, UNSPECIFIED MYOSITIS TYPE, UNSPECIFIED SITE: ICD-10-CM

## 2023-10-02 DIAGNOSIS — I25.118 ATHEROSCLEROTIC HEART DISEASE OF NATIVE CORONARY ARTERY WITH OTHER FORMS OF ANGINA PECTORIS: ICD-10-CM

## 2023-10-02 DIAGNOSIS — E78.00 PURE HYPERCHOLESTEROLEMIA: ICD-10-CM

## 2023-10-02 DIAGNOSIS — E21.4 OTHER SPECIFIED DISORDERS OF PARATHYROID GLAND: ICD-10-CM

## 2023-10-02 DIAGNOSIS — I25.10 CORONARY ARTERY DISEASE INVOLVING NATIVE CORONARY ARTERY OF NATIVE HEART WITHOUT ANGINA PECTORIS: ICD-10-CM

## 2023-10-02 DIAGNOSIS — I48.0 PAROXYSMAL ATRIAL FIBRILLATION: ICD-10-CM

## 2023-10-02 DIAGNOSIS — Z12.5 ENCOUNTER FOR SCREENING FOR MALIGNANT NEOPLASM OF PROSTATE: ICD-10-CM

## 2023-10-02 DIAGNOSIS — I10 PRIMARY HYPERTENSION: Primary | ICD-10-CM

## 2023-10-02 DIAGNOSIS — R05.3 CHRONIC COUGH: ICD-10-CM

## 2023-10-02 PROCEDURE — 99214 PR OFFICE/OUTPT VISIT, EST, LEVL IV, 30-39 MIN: ICD-10-PCS | Mod: ,,, | Performed by: FAMILY MEDICINE

## 2023-10-02 PROCEDURE — 99214 OFFICE O/P EST MOD 30 MIN: CPT | Mod: ,,, | Performed by: FAMILY MEDICINE

## 2023-10-02 RX ORDER — BENZONATATE 200 MG/1
200 CAPSULE ORAL 3 TIMES DAILY PRN
Qty: 30 CAPSULE | Refills: 3 | Status: SHIPPED | OUTPATIENT
Start: 2023-10-02 | End: 2023-10-12

## 2023-10-02 RX ORDER — CLINDAMYCIN HYDROCHLORIDE 300 MG/1
300 CAPSULE ORAL EVERY 6 HOURS
COMMUNITY
Start: 2023-08-30

## 2023-10-02 RX ORDER — HYDROCODONE BITARTRATE AND ACETAMINOPHEN 10; 325 MG/1; MG/1
1 TABLET ORAL EVERY 6 HOURS PRN
Qty: 120 TABLET | Refills: 0 | Status: SHIPPED | OUTPATIENT
Start: 2023-10-02 | End: 2023-10-03 | Stop reason: SDUPTHER

## 2023-10-02 RX ORDER — AZITHROMYCIN 250 MG/1
TABLET, FILM COATED ORAL
Qty: 6 TABLET | Refills: 1 | Status: SHIPPED | OUTPATIENT
Start: 2023-10-02 | End: 2023-10-07

## 2023-10-02 NOTE — PROGRESS NOTES
Subjective:      Patient ID: Shreyas Man is a 68 y.o. male.    Chief Complaint: Cough    Shreyas Man a 68 y.o. male presents for follow up on all regular problems which are reviewed and discussed.     Problem List Items Addressed This Visit          Pulmonary    Chronic cough       Cardiac/Vascular    Hypertension - Primary    Relevant Orders    CBC Auto Differential    Comprehensive Metabolic Panel    Lipid Panel    PSA, Screening    TSH    Urinalysis    Hemoglobin A1C    Vitamin D    Vitamin B12 & Folate    Hyperlipidemia    Atrial fibrillation    Overview     Post op after AVR/CABG in 2010  No known recurrence  Amiodarone to maintain RSR          Relevant Orders    CBC Auto Differential    Comprehensive Metabolic Panel    Lipid Panel    PSA, Screening    TSH    Urinalysis    Hemoglobin A1C    Vitamin D    Vitamin B12 & Folate    Atherosclerotic heart disease of native coronary artery with other forms of angina pectoris    Overview     S/p CABG 8/5/2010 Dr. Krystal AGARWAL to the LAD and LIMA to the PERLA Y graft with distal anastomosis to the diag; left radial artery to the PDA            Endocrine    Other specified disorders of parathyroid gland     Other Visit Diagnoses       Encounter for screening for malignant neoplasm of prostate        Relevant Orders    PSA, Screening    Disorder of iron metabolism, unspecified        Relevant Orders    Hemoglobin A1C    Myositis, unspecified myositis type, unspecified site        Relevant Orders    Vitamin D    Dietary folate deficiency anemia        Relevant Orders    Vitamin B12 & Folate            Past Medical History:  Past Medical History:   Diagnosis Date    Anemia     Arthritis     Atrial fibrillation     Cancer     Encounter for blood transfusion     GERD (gastroesophageal reflux disease)     Hyperlipidemia     Hypertension     Sleep apnea     Thyroid disease      Past Surgical History:   Procedure Laterality Date    CARDIAC CATHETERIZATION       CARDIAC VALVE SURGERY      CORONARY ARTERY BYPASS GRAFT      LEFT HEART CATHETERIZATION Left 8/1/2022    Procedure: Left heart cath;  Surgeon: Tristan Pham MD;  Location: [facility] CATH LAB;  Service: Cardiology;  Laterality: Left;  NO LV GRAM. HE HAS A BIOPROSTHETIC AO VALVE.    SPINE SURGERY       Review of patient's allergies indicates:   Allergen Reactions    Levaquin [levofloxacin]     Toradol [ketorolac]     Wasp sting [allergen ext-venom-honey bee]     Wasp venom Itching and Swelling    Zyrtec [cetirizine]     Tramadol hcl Itching     Current Outpatient Medications on File Prior to Visit   Medication Sig Dispense Refill    amLODIPine (NORVASC) 10 MG tablet Take 1 tablet (10 mg total) by mouth once daily. 90 tablet 3    aspirin (ECOTRIN) 81 MG EC tablet Take 1 tablet (81 mg total) by mouth once daily. 90 tablet 3    atorvastatin (LIPITOR) 80 MG tablet TAKE 1 TABLET DAILY 90 tablet 3    clindamycin (CLEOCIN) 300 MG capsule Take 300 mg by mouth every 6 (six) hours.      imiquimod (ALDARA) 5 % cream Apply topically every evening.      levothyroxine (SYNTHROID) 75 MCG tablet Take 1 tablet (75 mcg total) by mouth before breakfast. 90 tablet 3    lisinopriL 10 MG tablet Take 1 tablet (10 mg total) by mouth 2 (two) times daily. 180 tablet 3    methocarbamoL (ROBAXIN) 750 MG Tab Take two tablets 4 times a day as needed. 240 tablet 1    PACERONE 200 mg Tab TAKE 1 TABLET DAILY 90 tablet 3    pramoxine-benzyl alcohol (ITCH-X) 1-10 % Spry Apply 1 spray topically 4 (four) times daily as needed. 59.1 mL 5    tadalafiL (CIALIS) 20 MG Tab Take 20 mg by mouth every other day.      ALPRAZolam (XANAX) 0.25 MG tablet Take 1 tablet (0.25 mg total) by mouth daily as needed for Anxiety. 90 tablet 3    [DISCONTINUED] HYDROcodone-acetaminophen (NORCO)  mg per tablet Take 1 tablet by mouth every 6 (six) hours as needed for Pain. 120 tablet 0    [DISCONTINUED] predniSONE (DELTASONE) 20 MG tablet Take 2 tablets (40 mg total)  by mouth once daily. (Patient not taking: Reported on 10/2/2023) 10 tablet 0     No current facility-administered medications on file prior to visit.     Social History     Socioeconomic History    Marital status:    Tobacco Use    Smoking status: Former     Current packs/day: 0.00     Types: Cigarettes     Quit date: 2010     Years since quittin.3    Smokeless tobacco: Never    Tobacco comments:     quit 10+ years ago   Substance and Sexual Activity    Alcohol use: Never    Drug use: Never     Family History   Problem Relation Age of Onset    Heart attack Mother     Heart attack Father     Heart disease Father        Review of Systems   Constitutional: Negative.    HENT:  Negative for congestion, ear pain, nosebleeds and trouble swallowing.    Eyes:  Negative for pain and itching.   Respiratory:  Positive for cough. Negative for chest tightness.    Cardiovascular:  Negative for chest pain.   Gastrointestinal:  Negative for abdominal distention.   Endocrine: Negative for cold intolerance and heat intolerance.   Genitourinary:  Negative for difficulty urinating.   Musculoskeletal:  Negative for arthralgias.   Neurological:  Negative for dizziness.     Objective:     /74 (BP Location: Left arm, Patient Position: Sitting, BP Method: Large (Manual))   Pulse 87   Resp 18   Ht 6' (1.829 m)   Wt 102.1 kg (225 lb)   SpO2 98%   BMI 30.52 kg/m²     Physical Exam  Constitutional:       Appearance: Normal appearance. He is obese.   HENT:      Head: Normocephalic and atraumatic.      Right Ear: External ear normal.      Left Ear: External ear normal.      Nose: Nose normal.      Mouth/Throat:      Mouth: Mucous membranes are moist.      Pharynx: Oropharynx is clear.   Eyes:      Pupils: Pupils are equal, round, and reactive to light.   Cardiovascular:      Rate and Rhythm: Normal rate and regular rhythm.      Heart sounds: Normal heart sounds. No murmur heard.     No gallop.   Pulmonary:      Effort:  Pulmonary effort is normal. No respiratory distress.      Breath sounds: Normal breath sounds. No wheezing.   Abdominal:      Palpations: Abdomen is soft.   Musculoskeletal:         General: Normal range of motion.      Cervical back: Normal range of motion and neck supple.   Skin:     General: Skin is warm and dry.   Neurological:      General: No focal deficit present.      Mental Status: He is alert.   Psychiatric:         Mood and Affect: Mood normal.         Behavior: Behavior normal.         Thought Content: Thought content normal.         Judgment: Judgment normal.   Assessment:     1. Primary hypertension    2. Pure hypercholesterolemia    3. Paroxysmal atrial fibrillation    4. Coronary artery disease involving native coronary artery of native heart without angina pectoris    5. Encounter for screening for malignant neoplasm of prostate    6. Disorder of iron metabolism, unspecified    7. Myositis, unspecified myositis type, unspecified site    8. Dietary folate deficiency anemia    9. Chronic cough    10. Other specified disorders of parathyroid gland    11. Atherosclerotic heart disease of native coronary artery with other forms of angina pectoris        Plan:     Problem List Items Addressed This Visit          Pulmonary    Chronic cough       Cardiac/Vascular    Hypertension - Primary    Relevant Orders    CBC Auto Differential    Comprehensive Metabolic Panel    Lipid Panel    PSA, Screening    TSH    Urinalysis    Hemoglobin A1C    Vitamin D    Vitamin B12 & Folate    Hyperlipidemia    Atrial fibrillation    Relevant Orders    CBC Auto Differential    Comprehensive Metabolic Panel    Lipid Panel    PSA, Screening    TSH    Urinalysis    Hemoglobin A1C    Vitamin D    Vitamin B12 & Folate    Atherosclerotic heart disease of native coronary artery with other forms of angina pectoris       Endocrine    Other specified disorders of parathyroid gland     Other Visit Diagnoses       Encounter for screening  for malignant neoplasm of prostate        Relevant Orders    PSA, Screening    Disorder of iron metabolism, unspecified        Relevant Orders    Hemoglobin A1C    Myositis, unspecified myositis type, unspecified site        Relevant Orders    Vitamin D    Dietary folate deficiency anemia        Relevant Orders    Vitamin B12 & Folate          No follow-ups on file.  Lab owo  6m fu  Rx sent    I have discontinued Shreyas Man's predniSONE. I am also having him start on azithromycin and benzonatate. Additionally, I am having him maintain his atorvastatin, PACERONE, methocarbamoL, aspirin, imiquimod, tadalafiL, levothyroxine, ITCH-X, amLODIPine, lisinopriL, ALPRAZolam, HYDROcodone-acetaminophen, and clindamycin.    Shreyas was seen today for cough.    Diagnoses and all orders for this visit:    Primary hypertension  -     CBC Auto Differential; Future  -     Comprehensive Metabolic Panel; Standing  -     Lipid Panel; Standing  -     PSA, Screening; Future  -     TSH; Standing  -     Urinalysis; Standing  -     Hemoglobin A1C; Future  -     Vitamin D; Future  -     Vitamin B12 & Folate; Future    Pure hypercholesterolemia    Paroxysmal atrial fibrillation  -     CBC Auto Differential; Future  -     Comprehensive Metabolic Panel; Standing  -     Lipid Panel; Standing  -     PSA, Screening; Future  -     TSH; Standing  -     Urinalysis; Standing  -     Hemoglobin A1C; Future  -     Vitamin D; Future  -     Vitamin B12 & Folate; Future    Coronary artery disease involving native coronary artery of native heart without angina pectoris    Encounter for screening for malignant neoplasm of prostate  -     PSA, Screening; Future    Disorder of iron metabolism, unspecified  -     Hemoglobin A1C; Future    Myositis, unspecified myositis type, unspecified site  -     Vitamin D; Future    Dietary folate deficiency anemia  -     Vitamin B12 & Folate; Future    Chronic cough    Other specified disorders of parathyroid  gland    Atherosclerotic heart disease of native coronary artery with other forms of angina pectoris    Other orders  -     azithromycin (Z-UMM) 250 MG tablet; Take 2 tablets by mouth on day 1; Take 1 tablet by mouth on days 2-5  -     benzonatate (TESSALON) 200 MG capsule; Take 1 capsule (200 mg total) by mouth 3 (three) times daily as needed.      Medications Ordered This Encounter   Medications    azithromycin (Z-UMM) 250 MG tablet     Sig: Take 2 tablets by mouth on day 1; Take 1 tablet by mouth on days 2-5     Dispense:  6 tablet     Refill:  1    benzonatate (TESSALON) 200 MG capsule     Sig: Take 1 capsule (200 mg total) by mouth 3 (three) times daily as needed.     Dispense:  30 capsule     Refill:  3     [unfilled]  Orders Placed This Encounter   Procedures    CBC Auto Differential     Standing Status:   Future     Number of Occurrences:   1     Standing Expiration Date:   11/30/2024    Comprehensive Metabolic Panel     Standing Status:   Standing     Number of Occurrences:   4     Standing Expiration Date:   10/2/2024    Lipid Panel     Standing Status:   Standing     Number of Occurrences:   2     Standing Expiration Date:   11/30/2024    PSA, Screening     Standing Status:   Future     Number of Occurrences:   1     Standing Expiration Date:   11/30/2024    TSH     Standing Status:   Standing     Number of Occurrences:   2     Standing Expiration Date:   11/30/2024    Urinalysis     Standing Status:   Standing     Number of Occurrences:   1     Standing Expiration Date:   4/2/2025     Order Specific Question:   Collection Type     Answer:   Urine, Clean Catch    Hemoglobin A1C     Standing Status:   Future     Number of Occurrences:   1     Standing Expiration Date:   11/30/2024    Vitamin D     Standing Status:   Future     Number of Occurrences:   1     Standing Expiration Date:   11/30/2024    Vitamin B12 & Folate     Standing Status:   Future     Number of Occurrences:   1     Standing Expiration  Date:   11/30/2024

## 2023-10-03 RX ORDER — HYDROCODONE BITARTRATE AND ACETAMINOPHEN 10; 325 MG/1; MG/1
1 TABLET ORAL EVERY 6 HOURS PRN
Qty: 120 TABLET | Refills: 0 | Status: SHIPPED | OUTPATIENT
Start: 2023-10-03 | End: 2023-10-30 | Stop reason: SDUPTHER

## 2023-10-03 NOTE — TELEPHONE ENCOUNTER
----- Message from Miguel Angel Gregory III, DO sent at 10/3/2023  9:24 AM CDT -----  Labs good except: needs otc b12 1000mcg qd, vit d 2000u qd, need to see if takes thyroid pill by itself   let me know thx

## 2023-10-10 RX ORDER — ATORVASTATIN CALCIUM 80 MG/1
80 TABLET, FILM COATED ORAL DAILY
Qty: 90 TABLET | Refills: 3 | Status: SHIPPED | OUTPATIENT
Start: 2023-10-10

## 2023-10-11 RX ORDER — TADALAFIL 20 MG/1
20 TABLET ORAL EVERY OTHER DAY
Qty: 15 TABLET | Refills: 0 | Status: SHIPPED | OUTPATIENT
Start: 2023-10-11 | End: 2023-12-11 | Stop reason: SDUPTHER

## 2023-10-30 RX ORDER — HYDROCODONE BITARTRATE AND ACETAMINOPHEN 10; 325 MG/1; MG/1
1 TABLET ORAL EVERY 6 HOURS PRN
Qty: 120 TABLET | Refills: 0 | Status: SHIPPED | OUTPATIENT
Start: 2023-10-30 | End: 2023-12-04 | Stop reason: SDUPTHER

## 2023-11-09 DIAGNOSIS — Z00.00 ROUTINE GENERAL MEDICAL EXAMINATION AT A HEALTH CARE FACILITY: Primary | ICD-10-CM

## 2023-11-13 DIAGNOSIS — I25.83 CORONARY ARTERY DISEASE DUE TO LIPID RICH PLAQUE: ICD-10-CM

## 2023-11-13 DIAGNOSIS — I25.10 CORONARY ARTERY DISEASE DUE TO LIPID RICH PLAQUE: ICD-10-CM

## 2023-11-13 RX ORDER — ASPIRIN 81 MG/1
81 TABLET ORAL DAILY
Qty: 90 TABLET | Refills: 3 | Status: SHIPPED | OUTPATIENT
Start: 2023-11-13 | End: 2024-02-11

## 2023-11-13 RX ORDER — ALPRAZOLAM 0.25 MG/1
0.25 TABLET ORAL DAILY PRN
Qty: 90 TABLET | Refills: 3 | Status: SHIPPED | OUTPATIENT
Start: 2023-11-13 | End: 2024-11-07

## 2023-11-13 NOTE — H&P
[facility] Department - Short Stay Unit  History & Physical       DATE OF SERVICE:08/01/2022        PCP: Miguel Angel Gregory III, DO        CHIEF COMPLAINT:        Chief Complaint   Patient presents with    Results       Patient denies any cardiac complaints today.         HISTORY OF PRESENT ILLNESS:  Shreyas Man is a 67 y.o. male with a PMH of        Past Medical History:   Diagnosis Date    Anemia      Atrial fibrillation      GERD (gastroesophageal reflux disease)      Hyperlipidemia      Hypertension      Sleep apnea     He states that he has had some NUÑEZ (His anginal equivalent.) but not as bad as the episode just prior to his EST.     PAST MEDICAL HISTORY:       Past Medical History:   Diagnosis Date    Anemia      Atrial fibrillation      GERD (gastroesophageal reflux disease)      Hyperlipidemia      Hypertension      Sleep apnea           PAST SURGICAL HISTORY:        Past Surgical History:   Procedure Laterality Date    CARDIAC CATHETERIZATION        CARDIAC VALVE SURGERY        CORONARY ARTERY BYPASS GRAFT        SPINE SURGERY             SOCIAL HISTORY:  Social History               Socioeconomic History    Marital status:    Tobacco Use    Smoking status: Former Smoker       Packs/day: 1.00    Smokeless tobacco: Never Used    Tobacco comment: quit 10+ years ago   Substance and Sexual Activity    Alcohol use: Never    Drug use: Never            FAMILY HISTORY:        Family History   Problem Relation Age of Onset    Heart attack Mother      Heart attack Father      Heart disease Father              ALLERGIES:       Review of patient's allergies indicates:   Allergen Reactions    Levaquin [levofloxacin]      Toradol [ketorolac]      Wasp sting [allergen ext-venom-honey bee]      Wasp venom Itching and Swelling    Zyrtec [cetirizine]      Tramadol hcl Itching         MEDICATIONS:     Current Outpatient Medications:     ALPRAZolam (XANAX) 0.25 MG tablet, Take 1 tablet  (0.25 mg total) by mouth daily as needed for Anxiety., Disp: 90 tablet, Rfl: 1    amLODIPine (NORVASC) 10 MG tablet, Take 1 tablet (10 mg total) by mouth once daily., Disp: 90 tablet, Rfl: 3    atorvastatin (LIPITOR) 80 MG tablet, TAKE 1 TABLET DAILY, Disp: 90 tablet, Rfl: 3    EScitalopram oxalate (LEXAPRO) 10 MG tablet, TAKE 1 TABLET DAILY, Disp: 90 tablet, Rfl: 3    HYDROcodone-acetaminophen (NORCO)  mg per tablet, Take 1 tablet by mouth every 6 (six) hours as needed for Pain., Disp: 120 tablet, Rfl: 0    levothyroxine (SYNTHROID) 75 MCG tablet, Take 1 tablet (75 mcg total) by mouth before breakfast., Disp: 90 tablet, Rfl: 3    lisinopriL 10 MG tablet, Take 1 tablet (10 mg total) by mouth 2 (two) times daily., Disp: 180 tablet, Rfl: 1    methocarbamoL (ROBAXIN) 750 MG Tab, Take two tablets 4 times a day as needed., Disp: 240 tablet, Rfl: 1    PACERONE 200 mg Tab, TAKE 1 TABLET DAILY, Disp: 90 tablet, Rfl: 3    predniSONE (DELTASONE) 20 MG tablet, Take 2 tablets (40 mg total) by mouth once daily., Disp: 10 tablet, Rfl: 0  Medications have been reviewed and but reconciled. He did not bring he meds today.  PHYSICAL EXAM:  /90(BP Location: Left arm, Patient Position: Sitting)   Pulse 78  Ht 6' (1.829 m)   Wt 106.6 kg (235 lb)   SpO2 97%   BMI 31.87 kg/m²       Wt Readings from Last 3 Encounters:   06/22/22 106.6 kg (235 lb)   05/11/22 108 kg (238 lb)   04/06/22 109.8 kg (242 lb)      Body mass index is 31.87 kg/m².     Physical Exam  Vitals and nursing note reviewed.   Constitutional:       Appearance: Normal appearance. He is obese.   HENT:      Head: Normocephalic and atraumatic.   Eyes:      Pupils: Pupils are equal, round, and reactive to light.   Neck:      Vascular: No carotid bruit.   Cardiovascular:      Rate and Rhythm: Normal rate and regular rhythm.      Pulses: Normal pulses.      Heart sounds: Murmur heard.      Comments: I/VI ALEXANDER RUSB  Pulmonary:      Effort: Pulmonary effort  is normal.      Breath sounds: Normal breath sounds.   Abdominal:      General: Bowel sounds are normal.      Palpations: Abdomen is soft.   Musculoskeletal:      Cervical back: Neck supple.      Right lower leg: No edema.      Left lower leg: No edema.   Skin:     General: Skin is warm and dry.      Capillary Refill: Capillary refill takes less than 2 seconds.   Neurological:      General: No focal deficit present.      Mental Status: He is oriented to person, place, and time.   Psychiatric:         Mood and Affect: Mood normal.         Behavior: Behavior normal.         CARDIAC STUDIES REVIEWED:Stress test  Results for orders placed during the hospital encounter of 05/11/22     Exercise Stress - EKG     Interpretation Summary    The EKG portion of this study is positive for ischemia.    The patient reported no chest pain during the stress test.    During stress, atrial fibrillation is noted.     Conclusion:  Abnormal EST suggestive of coronary ischemia.     echocardiogram  Results for orders placed during the hospital encounter of 04/13/22     Echo Saline Bubble? No     Interpretation Summary  · The left ventricle is normal in size with mild concentric hypertrophy and  · The estimated ejection fraction is 65%.  · Left ventricular diastolic dysfunction.  · Bioprosthetic aortic valve, well seated, RADHA 1.76 cm2; PPG/MPG 35/14 mmHg.  · Mild mitral regurgitation.  · Mild left atrial enlargement.  · Normal right ventricular size.  · Mild tricuspid regurgitation.  · Mild pulmonic regurgitation.                 ASSESSMENT:       Patient Active Problem List   Diagnosis    Nonrheumatic aortic valve stenosis    Hx of aortic valve replacement    Hypertension    Hyperlipidemia    Atrial fibrillation    Sleep apnea    Coronary artery disease involving native coronary artery of native heart    Low back pain         Problem List Items Addressed This Visit    None              Visit Diagnoses      Pre-operative  laboratory examination    -  Primary     Relevant Orders     CBC Auto Differential     Comprehensive Metabolic Panel     Hx of CABG         Relevant Orders     CBC Auto Differential     Case Request-Cath Lab: Left heart cath, Percutaneous coronary intervention (Completed)     Abnormal findings on diagnostic imaging of heart and coronary circulation          Relevant Orders     CBC Auto Differential     Case Request-Cath Lab: Left heart cath, Percutaneous coronary intervention (Completed)     Anginal equivalent         Relevant Orders     Case Request-Cath Lab: Left heart cath, Percutaneous coronary intervention (Completed)             PLAN: [facility] with poss pci.  Risks, benefits, and alternative were explained.           fall precautions Normal

## 2023-11-14 PROCEDURE — 88305 PATHOLOGY, DERMATOLOGY: ICD-10-PCS | Mod: 26,,, | Performed by: PATHOLOGY

## 2023-11-14 PROCEDURE — 88305 TISSUE EXAM BY PATHOLOGIST: CPT | Mod: TC,SUR

## 2023-11-14 PROCEDURE — 88305 TISSUE EXAM BY PATHOLOGIST: CPT | Mod: 26,,, | Performed by: PATHOLOGY

## 2023-11-15 ENCOUNTER — LAB REQUISITION (OUTPATIENT)
Dept: LAB | Facility: HOSPITAL | Age: 68
End: 2023-11-15
Payer: MEDICARE

## 2023-11-15 DIAGNOSIS — D49.2 NEOPLASM OF UNSPECIFIED BEHAVIOR OF BONE, SOFT TISSUE, AND SKIN: ICD-10-CM

## 2023-12-04 RX ORDER — HYDROCODONE BITARTRATE AND ACETAMINOPHEN 10; 325 MG/1; MG/1
1 TABLET ORAL EVERY 6 HOURS PRN
Qty: 120 TABLET | Refills: 0 | Status: SHIPPED | OUTPATIENT
Start: 2023-12-04 | End: 2024-01-02 | Stop reason: SDUPTHER

## 2023-12-05 ENCOUNTER — OFFICE VISIT (OUTPATIENT)
Dept: CARDIOLOGY | Facility: CLINIC | Age: 68
End: 2023-12-05
Payer: MEDICARE

## 2023-12-05 VITALS
SYSTOLIC BLOOD PRESSURE: 128 MMHG | OXYGEN SATURATION: 97 % | DIASTOLIC BLOOD PRESSURE: 88 MMHG | BODY MASS INDEX: 30.2 KG/M2 | HEART RATE: 97 BPM | HEIGHT: 72 IN | WEIGHT: 223 LBS

## 2023-12-05 DIAGNOSIS — I35.0 NONRHEUMATIC AORTIC VALVE STENOSIS: ICD-10-CM

## 2023-12-05 DIAGNOSIS — I10 HYPERTENSION, UNSPECIFIED TYPE: Primary | ICD-10-CM

## 2023-12-05 DIAGNOSIS — E78.2 MIXED HYPERLIPIDEMIA: ICD-10-CM

## 2023-12-05 DIAGNOSIS — Z95.2 S/P AVR (AORTIC VALVE REPLACEMENT): ICD-10-CM

## 2023-12-05 DIAGNOSIS — I25.118 ATHEROSCLEROTIC HEART DISEASE OF NATIVE CORONARY ARTERY WITH OTHER FORMS OF ANGINA PECTORIS: ICD-10-CM

## 2023-12-05 PROCEDURE — 99214 OFFICE O/P EST MOD 30 MIN: CPT | Mod: S$PBB,,, | Performed by: NURSE PRACTITIONER

## 2023-12-05 PROCEDURE — 93010 ELECTROCARDIOGRAM REPORT: CPT | Mod: S$PBB,,, | Performed by: INTERNAL MEDICINE

## 2023-12-05 PROCEDURE — 99214 PR OFFICE/OUTPT VISIT, EST, LEVL IV, 30-39 MIN: ICD-10-PCS | Mod: S$PBB,,, | Performed by: NURSE PRACTITIONER

## 2023-12-05 PROCEDURE — 99214 OFFICE O/P EST MOD 30 MIN: CPT | Mod: PBBFAC | Performed by: NURSE PRACTITIONER

## 2023-12-05 PROCEDURE — 93010 EKG 12-LEAD: ICD-10-PCS | Mod: S$PBB,,, | Performed by: INTERNAL MEDICINE

## 2023-12-05 PROCEDURE — 93005 ELECTROCARDIOGRAM TRACING: CPT | Mod: PBBFAC | Performed by: INTERNAL MEDICINE

## 2023-12-08 NOTE — PROGRESS NOTES
PCP: Miguel Angel Gregory III, DO    Referring Provider:     Subjective:   Shreyas Man is a 68 y.o. male with hx of AS s/p AVR with a bioprosthetic valve, h/o post op a fib in  without recurrence on amiodarone to maintain RSR, CAD s/p CABG, HTN, and HLD,  who presents for routine follow up. He remains physically active and asymptomatic.     2023 presents for routine follow up. He remains physically active with out issues.   3/1/2023 presents for routine follow up. He states he is doing will but does have indigestion at times. He has no exertional symptoms.         Fhx:  Family History   Problem Relation Age of Onset    Heart attack Mother     Heart attack Father     Heart disease Father      Shx:   Social History     Socioeconomic History    Marital status:    Tobacco Use    Smoking status: Former     Current packs/day: 0.00     Types: Cigarettes     Quit date: 2010     Years since quittin.5    Smokeless tobacco: Never    Tobacco comments:     quit 10+ years ago   Substance and Sexual Activity    Alcohol use: Never    Drug use: Never       EKG   Results for orders placed or performed in visit on 23   EKG 12-lead    Collection Time: 23  1:20 PM    Narrative    Test Reason : I10,    Vent. Rate : 091 BPM     Atrial Rate : 091 BPM     P-R Int : 166 ms          QRS Dur : 090 ms      QT Int : 370 ms       P-R-T Axes : 070 056 050 degrees     QTc Int : 455 ms    Normal sinus rhythm  Nonspecific T wave abnormality  Abnormal ECG  When compared with ECG of 18-SEP-2023 19:52,  PREVIOUS ECG IS PRESENT  Confirmed by Tristan Pham MD ([id number]) on 2023 8:36:44 AM    Referred By:             Confirmed By:Tristan Pham MD      ECHO Results for orders placed during the hospital encounter of 23    Echo Saline Bubble? No    Interpretation Summary  · The left ventricle is normal in size with mild concentric hypertrophy and normal systolic function.  · The estimated ejection fraction  is 65%.  · Left ventricular diastolic dysfunction.  · Normal right ventricular size.  · There is a bioprosthetic aortic valve present.  · The aortic valve mean gradient is 21 mmHg with a dimensionless index of 0.39.  · Mild mitral regurgitation.  · Mild tricuspid regurgitation.  · Mild pulmonic regurgitation.  · Mild left atrial enlargement.    [facility] Results for orders placed during the hospital encounter of 08/01/22    Cardiac catheterization    Conclusion  · The left ventricular systolic function was normal.  · The left ventricular end diastolic pressure was normal.  · The pre-procedure left ventricular end diastolic pressure was 20.  · The ejection fraction was calculated to be 60%.  · The left ventricular ejection fraction was grossly normal.  · There was no mitral valve regurgitation.  · The Dist Cx lesion was 50% stenosed.  · The Prox LAD lesion was 80% stenosed.  · The estimated blood loss was none.  · There was three vessel coronary artery disease.  · Zenia to OM is atrophiied with no sig disease in the native om.    The procedure log was documented by Documenter: RT Lena and verified by Tristna Pham MD.    Date: 8/1/2022  Time: 12:24 PM        Lab Results   Component Value Date     10/02/2023    K 4.4 10/02/2023     10/02/2023    CO2 29 10/02/2023    BUN 16 10/02/2023    CREATININE 0.99 10/02/2023    CALCIUM 10.3 (H) 10/02/2023    ANIONGAP 7 10/02/2023    EGFRNONAA 100 07/28/2022       Lab Results   Component Value Date    CHOL 178 10/02/2023    CHOL 149 09/30/2022    CHOL 154 10/19/2021     Lab Results   Component Value Date    HDL 57 10/02/2023    HDL 54 09/30/2022    HDL 53 10/19/2021     Lab Results   Component Value Date    LDLCALC 95 10/02/2023    LDLCALC 77 09/30/2022    LDLCALC 80 10/19/2021     Lab Results   Component Value Date    TRIG 130 10/02/2023    TRIG 89 09/30/2022    TRIG 104 10/19/2021     Lab Results   Component Value Date    CHOLHDL 3.1 10/02/2023    CHOLHDL 2.8  09/30/2022    CHOLHDL 2.9 10/19/2021       Lab Results   Component Value Date    WBC 8.24 10/02/2023    HGB 17.3 10/02/2023    HCT 51.5 10/02/2023    MCV 95.9 10/02/2023     10/02/2023           Current Outpatient Medications:     ALPRAZolam (XANAX) 0.25 MG tablet, Take 1 tablet (0.25 mg total) by mouth daily as needed for Anxiety., Disp: 90 tablet, Rfl: 3    amLODIPine (NORVASC) 10 MG tablet, Take 1 tablet (10 mg total) by mouth once daily., Disp: 90 tablet, Rfl: 3    aspirin (ECOTRIN) 81 MG EC tablet, Take 1 tablet (81 mg total) by mouth once daily., Disp: 90 tablet, Rfl: 3    atorvastatin (LIPITOR) 80 MG tablet, Take 1 tablet (80 mg total) by mouth once daily., Disp: 90 tablet, Rfl: 3    clindamycin (CLEOCIN) 300 MG capsule, Take 300 mg by mouth every 6 (six) hours., Disp: , Rfl:     HYDROcodone-acetaminophen (NORCO)  mg per tablet, Take 1 tablet by mouth every 6 (six) hours as needed for Pain., Disp: 120 tablet, Rfl: 0    imiquimod (ALDARA) 5 % cream, Apply topically every evening., Disp: , Rfl:     levothyroxine (SYNTHROID) 75 MCG tablet, Take 1 tablet (75 mcg total) by mouth before breakfast., Disp: 90 tablet, Rfl: 3    lisinopriL 10 MG tablet, Take 1 tablet (10 mg total) by mouth 2 (two) times daily., Disp: 180 tablet, Rfl: 3    methocarbamoL (ROBAXIN) 750 MG Tab, Take two tablets 4 times a day as needed., Disp: 240 tablet, Rfl: 1    PACERONE 200 mg Tab, TAKE 1 TABLET DAILY, Disp: 90 tablet, Rfl: 3    pramoxine-benzyl alcohol (ITCH-X) 1-10 % Spry, Apply 1 spray topically 4 (four) times daily as needed., Disp: 59.1 mL, Rfl: 5    tadalafiL (CIALIS) 20 MG Tab, Take 1 tablet (20 mg total) by mouth every other day., Disp: 15 tablet, Rfl: 0  Meds reviewed but not reconciled. He does not bring his meds in for reconciliation.      Review of Systems   Respiratory:  Negative for shortness of breath.    Cardiovascular:  Negative for chest pain, palpitations, orthopnea, claudication, leg swelling and PND.    Gastrointestinal:  Negative for heartburn.   Neurological:  Negative for dizziness, loss of consciousness and weakness.           Objective:   /88 (BP Location: Left arm, Patient Position: Sitting)   Pulse 97   Ht 6' (1.829 m)   Wt 101.2 kg (223 lb)   SpO2 97%   BMI 30.24 kg/m²     Physical Exam  Vitals and nursing note reviewed.   Constitutional:       Appearance: Normal appearance. He is obese.   HENT:      Head: Atraumatic.   Neck:      Vascular: No carotid bruit.   Cardiovascular:      Rate and Rhythm: Normal rate and regular rhythm.      Pulses: Normal pulses.      Heart sounds: Normal heart sounds.   Pulmonary:      Effort: Pulmonary effort is normal.      Breath sounds: Normal breath sounds.   Abdominal:      Palpations: Abdomen is soft.   Musculoskeletal:      Cervical back: Neck supple.      Right lower leg: No edema.      Left lower leg: No edema.   Skin:     General: Skin is warm and dry.      Capillary Refill: Capillary refill takes less than 2 seconds.   Neurological:      Mental Status: He is alert.           Assessment:     1. Hypertension, unspecified type  EKG 12-lead    EKG 12-lead      2. S/P AVR (aortic valve replacement)  Echo      3. Atherosclerotic heart disease of native coronary artery with other forms of angina pectoris        4. Mixed hyperlipidemia        5. Nonrheumatic aortic valve stenosis              Plan:   Atherosclerotic heart disease of native coronary artery with other forms of angina pectoris  Asymptomatic   Continue ASA/Lipitor     Hyperlipidemia  Lipid panel results from 10/2/2023 reviewed    Trig 130  HDL 57  LDL 95  Lipitor 80 mg po daily  Lipids followed by PCP  LDL is not to goal.  Diet/exercise and LDL not less than 70 mg/dl recommend adding Zetia 10 mg po daily      Hypertension  128/88 mmHg    Nonrheumatic aortic valve stenosis  Bioprosthetic AVR 8/5/2012  Repeat echo prior to next follow up in 6 months        RTC: 6 months

## 2023-12-08 NOTE — ASSESSMENT & PLAN NOTE
Lipid panel results from 10/2/2023 reviewed    Trig 130  HDL 57  LDL 95  Lipitor 80 mg po daily  Lipids followed by PCP  LDL is not to goal.  Diet/exercise and LDL not less than 70 mg/dl recommend adding Zetia 10 mg po daily

## 2023-12-09 DIAGNOSIS — Z71.89 COMPLEX CARE COORDINATION: ICD-10-CM

## 2023-12-12 ENCOUNTER — OFFICE VISIT (OUTPATIENT)
Dept: GASTROENTEROLOGY | Facility: CLINIC | Age: 68
End: 2023-12-12
Attending: FAMILY MEDICINE
Payer: MEDICARE

## 2023-12-12 VITALS — BODY MASS INDEX: 30.61 KG/M2 | WEIGHT: 226 LBS | HEIGHT: 72 IN

## 2023-12-12 DIAGNOSIS — Z12.11 SCREENING FOR COLON CANCER: Primary | ICD-10-CM

## 2023-12-12 DIAGNOSIS — Z00.00 ROUTINE GENERAL MEDICAL EXAMINATION AT A HEALTH CARE FACILITY: ICD-10-CM

## 2023-12-12 PROCEDURE — 99499 UNLISTED E&M SERVICE: CPT | Mod: S$PBB,,,

## 2023-12-12 PROCEDURE — 99499 NO LOS: ICD-10-PCS | Mod: S$PBB,,,

## 2023-12-12 RX ORDER — TADALAFIL 20 MG/1
20 TABLET ORAL EVERY OTHER DAY
Qty: 12 TABLET | Refills: 0 | Status: SHIPPED | OUTPATIENT
Start: 2023-12-12 | End: 2024-02-20 | Stop reason: SDUPTHER

## 2023-12-26 ENCOUNTER — HOSPITAL ENCOUNTER (OUTPATIENT)
Dept: CARDIOLOGY | Facility: HOSPITAL | Age: 68
Discharge: HOME | End: 2023-12-26
Attending: NURSE PRACTITIONER
Payer: MEDICARE

## 2023-12-26 VITALS — BODY MASS INDEX: 30.61 KG/M2 | HEIGHT: 72 IN | WEIGHT: 226 LBS

## 2023-12-26 DIAGNOSIS — Z95.2 S/P AVR (AORTIC VALVE REPLACEMENT): ICD-10-CM

## 2023-12-26 LAB
AORTIC ROOT ANNULUS: 3.68 CM
AORTIC VALVE CUSP SEPERATION: 1.62 CM
AV INDEX (PROSTH): 0.48
AV MEAN GRADIENT: 12 MMHG
AV PEAK GRADIENT: 22 MMHG
AV VALVE AREA BY VELOCITY RATIO: 1.87 CM²
AV VALVE AREA: 2.11 CM²
AV VELOCITY RATIO: 0.42
BSA FOR ECHO PROCEDURE: 2.28 M2
CV ECHO LV RWT: 0.51 CM
DOP CALC AO PEAK VEL: 2.34 M/S
DOP CALC AO VTI: 51.5 CM
DOP CALC LVOT AREA: 4.4 CM2
DOP CALC LVOT DIAMETER: 2.37 CM
DOP CALC LVOT PEAK VEL: 0.99 M/S
DOP CALC LVOT STROKE VOLUME: 108.91 CM3
DOP CALCLVOT PEAK VEL VTI: 24.7 CM
E WAVE DECELERATION TIME: 249.2 MSEC
E/A RATIO: 1.08
E/E' RATIO: 11.05 M/S
ECHO LV POSTERIOR WALL: 1.16 CM (ref 0.6–1.1)
FRACTIONAL SHORTENING: 42 % (ref 28–44)
INTERVENTRICULAR SEPTUM: 1.26 CM (ref 0.6–1.1)
IVC DIAMETER: 1.01 CM
LEFT ATRIUM VOLUME INDEX MOD: 31.6 ML/M2
LEFT ATRIUM VOLUME MOD: 70.76 CM3
LEFT INTERNAL DIMENSION IN SYSTOLE: 2.64 CM (ref 2.1–4)
LEFT VENTRICLE DIASTOLIC VOLUME INDEX: 42.98 ML/M2
LEFT VENTRICLE DIASTOLIC VOLUME: 96.28 ML
LEFT VENTRICLE MASS INDEX: 92 G/M2
LEFT VENTRICLE SYSTOLIC VOLUME INDEX: 11.5 ML/M2
LEFT VENTRICLE SYSTOLIC VOLUME: 25.67 ML
LEFT VENTRICULAR INTERNAL DIMENSION IN DIASTOLE: 4.58 CM (ref 3.5–6)
LEFT VENTRICULAR MASS: 206.04 G
LV LATERAL E/E' RATIO: 9.55 M/S
LV SEPTAL E/E' RATIO: 13.13 M/S
LVOT MG: 2.11 MMHG
LVOT MV: 0.68 CM/S
MV PEAK A VEL: 0.97 M/S
MV PEAK E VEL: 1.05 M/S
MV STENOSIS PRESSURE HALF TIME: 72.27 MS
MV VALVE AREA P 1/2 METHOD: 3.04 CM2
PISA MRMAX VEL: 2.04 M/S
PISA TR MAX VEL: 2.36 M/S
PV PEAK GRADIENT: 3 MMHG
PV PEAK VELOCITY: 0.9 M/S
RA PRESSURE ESTIMATED: 3 MMHG
RA VOL SYS: 40.95 ML
RIGHT ATRIAL AREA: 15.9 CM2
RIGHT VENTRICULAR LENGTH IN DIASTOLE (APICAL 4-CHAMBER VIEW): 6.22 CM
RV MID DIAMA: 2.33 CM
RV TB RVSP: 5 MMHG
TDI LATERAL: 0.11 M/S
TDI SEPTAL: 0.08 M/S
TDI: 0.1 M/S
TR MAX PG: 22 MMHG
TRICUSPID ANNULAR PLANE SYSTOLIC EXCURSION: 1.64 CM
TV REST PULMONARY ARTERY PRESSURE: 25 MMHG
Z-SCORE OF LEFT VENTRICULAR DIMENSION IN END DIASTOLE: -5.59
Z-SCORE OF LEFT VENTRICULAR DIMENSION IN END SYSTOLE: -4.82

## 2023-12-26 PROCEDURE — 93306 TTE W/DOPPLER COMPLETE: CPT | Mod: 26,,, | Performed by: INTERNAL MEDICINE

## 2023-12-26 PROCEDURE — 93306 ECHO (CUPID ONLY): ICD-10-PCS | Mod: 26,,, | Performed by: INTERNAL MEDICINE

## 2023-12-26 PROCEDURE — 93306 TTE W/DOPPLER COMPLETE: CPT

## 2024-01-02 RX ORDER — HYDROCODONE BITARTRATE AND ACETAMINOPHEN 10; 325 MG/1; MG/1
1 TABLET ORAL EVERY 6 HOURS PRN
Qty: 120 TABLET | Refills: 0 | Status: SHIPPED | OUTPATIENT
Start: 2024-01-02 | End: 2024-01-29 | Stop reason: SDUPTHER

## 2024-01-09 ENCOUNTER — OFFICE VISIT (OUTPATIENT)
Dept: PRIMARY CARE CLINIC | Facility: CLINIC | Age: 69
End: 2024-01-09
Payer: MEDICARE

## 2024-01-09 VITALS
DIASTOLIC BLOOD PRESSURE: 88 MMHG | OXYGEN SATURATION: 98 % | BODY MASS INDEX: 30.48 KG/M2 | HEIGHT: 72 IN | RESPIRATION RATE: 18 BRPM | WEIGHT: 225 LBS | HEART RATE: 105 BPM | SYSTOLIC BLOOD PRESSURE: 138 MMHG

## 2024-01-09 DIAGNOSIS — E11.9 TYPE 2 DIABETES MELLITUS WITHOUT COMPLICATION, WITHOUT LONG-TERM CURRENT USE OF INSULIN: ICD-10-CM

## 2024-01-09 DIAGNOSIS — J02.8 ACUTE PHARYNGITIS DUE TO OTHER SPECIFIED ORGANISMS: Primary | ICD-10-CM

## 2024-01-09 DIAGNOSIS — I25.118 ATHEROSCLEROTIC HEART DISEASE OF NATIVE CORONARY ARTERY WITH OTHER FORMS OF ANGINA PECTORIS: ICD-10-CM

## 2024-01-09 DIAGNOSIS — I48.0 PAROXYSMAL ATRIAL FIBRILLATION: ICD-10-CM

## 2024-01-09 DIAGNOSIS — Z95.2 HX OF AORTIC VALVE REPLACEMENT: ICD-10-CM

## 2024-01-09 DIAGNOSIS — C43.9 MALIGNANT MELANOMA, UNSPECIFIED SITE: ICD-10-CM

## 2024-01-09 DIAGNOSIS — J30.1 NON-SEASONAL ALLERGIC RHINITIS DUE TO POLLEN: ICD-10-CM

## 2024-01-09 DIAGNOSIS — E21.4 OTHER SPECIFIED DISORDERS OF PARATHYROID GLAND: ICD-10-CM

## 2024-01-09 DIAGNOSIS — I10 PRIMARY HYPERTENSION: ICD-10-CM

## 2024-01-09 DIAGNOSIS — R05.3 CHRONIC COUGH: ICD-10-CM

## 2024-01-09 DIAGNOSIS — E78.00 PURE HYPERCHOLESTEROLEMIA: ICD-10-CM

## 2024-01-09 PROCEDURE — 99214 OFFICE O/P EST MOD 30 MIN: CPT | Mod: ,,, | Performed by: FAMILY MEDICINE

## 2024-01-09 PROCEDURE — 96372 THER/PROPH/DIAG INJ SC/IM: CPT | Mod: ,,, | Performed by: FAMILY MEDICINE

## 2024-01-09 RX ORDER — METHYLPREDNISOLONE ACETATE 80 MG/ML
80 INJECTION, SUSPENSION INTRA-ARTICULAR; INTRALESIONAL; INTRAMUSCULAR; SOFT TISSUE
Status: COMPLETED | OUTPATIENT
Start: 2024-01-09 | End: 2024-01-09

## 2024-01-09 RX ORDER — AZITHROMYCIN 250 MG/1
TABLET, FILM COATED ORAL
Qty: 6 TABLET | Refills: 1 | Status: SHIPPED | OUTPATIENT
Start: 2024-01-09 | End: 2024-01-14

## 2024-01-09 RX ORDER — BENZONATATE 200 MG/1
200 CAPSULE ORAL 3 TIMES DAILY PRN
Qty: 30 CAPSULE | Refills: 3 | Status: SHIPPED | OUTPATIENT
Start: 2024-01-09 | End: 2024-01-19

## 2024-01-09 RX ORDER — DEXAMETHASONE SODIUM PHOSPHATE 4 MG/ML
4 INJECTION, SOLUTION INTRA-ARTICULAR; INTRALESIONAL; INTRAMUSCULAR; INTRAVENOUS; SOFT TISSUE
Status: COMPLETED | OUTPATIENT
Start: 2024-01-09 | End: 2024-01-09

## 2024-01-09 RX ORDER — LINCOMYCIN HYDROCHLORIDE 300 MG/ML
600 INJECTION, SOLUTION INTRAMUSCULAR; INTRAVENOUS; SUBCONJUNCTIVAL
Status: COMPLETED | OUTPATIENT
Start: 2024-01-09 | End: 2024-01-09

## 2024-01-09 RX ORDER — PREDNISONE 20 MG/1
40 TABLET ORAL DAILY
Qty: 10 TABLET | Refills: 0 | Status: SHIPPED | OUTPATIENT
Start: 2024-01-09

## 2024-01-09 RX ADMIN — DEXAMETHASONE SODIUM PHOSPHATE 4 MG: 4 INJECTION, SOLUTION INTRA-ARTICULAR; INTRALESIONAL; INTRAMUSCULAR; INTRAVENOUS; SOFT TISSUE at 01:01

## 2024-01-09 RX ADMIN — LINCOMYCIN HYDROCHLORIDE 600 MG: 300 INJECTION, SOLUTION INTRAMUSCULAR; INTRAVENOUS; SUBCONJUNCTIVAL at 01:01

## 2024-01-09 RX ADMIN — METHYLPREDNISOLONE ACETATE 80 MG: 80 INJECTION, SUSPENSION INTRA-ARTICULAR; INTRALESIONAL; INTRAMUSCULAR; SOFT TISSUE at 01:01

## 2024-01-09 NOTE — PROGRESS NOTES
Subjective:      Patient ID: Shreyas Man is a 68 y.o. male.    Chief Complaint: Cough    Shreyas Man a 68 y.o. male presents for follow up on all regular problems which are reviewed and discussed.     Problem List Items Addressed This Visit          ENT    Acute pharyngitis due to other specified organisms - Primary    Non-seasonal allergic rhinitis due to pollen       Pulmonary    Chronic cough       Cardiac/Vascular    Hx of aortic valve replacement    Hypertension    Hyperlipidemia    Atrial fibrillation    Overview     Post op after AVR/CABG in 2010  No known recurrence  Amiodarone to maintain RSR          Atherosclerotic heart disease of native coronary artery with other forms of angina pectoris    Overview     S/p CABG 8/5/2010 Dr. Krystal AGARWAL to the LAD and LIMA to the PERLA Y graft with distal anastomosis to the diag; left radial artery to the PDA            Oncology    Malignant melanoma, unspecified site       Endocrine    Other specified disorders of parathyroid gland    Type 2 diabetes mellitus without complication, without long-term current use of insulin       Past Medical History:  Past Medical History:   Diagnosis Date    Anemia     Arthritis     Atrial fibrillation     Cancer     Encounter for blood transfusion     GERD (gastroesophageal reflux disease)     Hyperlipidemia     Hypertension     Sleep apnea     Thyroid disease     Type 2 diabetes mellitus without complication, without long-term current use of insulin 1/9/2024     Past Surgical History:   Procedure Laterality Date    CARDIAC CATHETERIZATION      CARDIAC VALVE SURGERY      CORONARY ARTERY BYPASS GRAFT      LEFT HEART CATHETERIZATION Left 8/1/2022    Procedure: Left heart cath;  Surgeon: Tristan Pham MD;  Location: [facility] CATH LAB;  Service: Cardiology;  Laterality: Left;  NO LV GRAM. HE HAS A BIOPROSTHETIC AO VALVE.    SPINE SURGERY       Review of patient's allergies indicates:   Allergen Reactions    Levaquin  [levofloxacin]     Toradol [ketorolac]     Wasp sting [allergen ext-venom-honey bee]     Wasp venom Itching and Swelling    Zyrtec [cetirizine]     Tramadol hcl Itching     Current Outpatient Medications on File Prior to Visit   Medication Sig Dispense Refill    ALPRAZolam (XANAX) 0.25 MG tablet Take 1 tablet (0.25 mg total) by mouth daily as needed for Anxiety. 90 tablet 3    amLODIPine (NORVASC) 10 MG tablet Take 1 tablet (10 mg total) by mouth once daily. 90 tablet 3    aspirin (ECOTRIN) 81 MG EC tablet Take 1 tablet (81 mg total) by mouth once daily. 90 tablet 3    atorvastatin (LIPITOR) 80 MG tablet Take 1 tablet (80 mg total) by mouth once daily. 90 tablet 3    clindamycin (CLEOCIN) 300 MG capsule Take 300 mg by mouth every 6 (six) hours.      HYDROcodone-acetaminophen (NORCO)  mg per tablet Take 1 tablet by mouth every 6 (six) hours as needed for Pain. 120 tablet 0    imiquimod (ALDARA) 5 % cream Apply topically every evening.      levothyroxine (SYNTHROID) 75 MCG tablet Take 1 tablet (75 mcg total) by mouth before breakfast. 90 tablet 3    lisinopriL 10 MG tablet Take 1 tablet (10 mg total) by mouth 2 (two) times daily. 180 tablet 3    methocarbamoL (ROBAXIN) 750 MG Tab Take two tablets 4 times a day as needed. 240 tablet 1    PACERONE 200 mg Tab TAKE 1 TABLET DAILY 90 tablet 3    pramoxine-benzyl alcohol (ITCH-X) 1-10 % Spry Apply 1 spray topically 4 (four) times daily as needed. 59.1 mL 5    tadalafiL (CIALIS) 20 MG Tab Take 1 tablet (20 mg total) by mouth every other day. 12 tablet 0     No current facility-administered medications on file prior to visit.     Social History     Socioeconomic History    Marital status:    Tobacco Use    Smoking status: Former     Current packs/day: 0.00     Types: Cigarettes     Quit date: 2010     Years since quittin.6    Smokeless tobacco: Never    Tobacco comments:     quit 10+ years ago   Substance and Sexual Activity    Alcohol use: Never     Drug use: Never     Family History   Problem Relation Age of Onset    Heart attack Mother     Heart attack Father     Heart disease Father        Review of Systems   Constitutional: Negative.    HENT:  Negative for congestion, ear pain, nosebleeds, sneezing, sore throat and trouble swallowing.    Eyes:  Negative for pain and itching.   Respiratory:  Positive for cough. Negative for chest tightness and shortness of breath.    Cardiovascular:  Negative for chest pain.   Gastrointestinal:  Negative for abdominal distention.   Endocrine: Negative for cold intolerance and heat intolerance.   Genitourinary:  Negative for difficulty urinating.   Musculoskeletal:  Negative for arthralgias.   Neurological:  Negative for dizziness.       Objective:     /88 (BP Location: Left arm, Patient Position: Sitting, BP Method: Large (Manual))   Pulse 105   Resp 18   Ht 6' (1.829 m)   Wt 102.1 kg (225 lb)   SpO2 98%   BMI 30.52 kg/m²     Physical Exam  Constitutional:       Appearance: Normal appearance. He is obese.   HENT:      Head: Normocephalic and atraumatic.      Right Ear: External ear normal.      Left Ear: External ear normal.      Nose: Congestion present.      Mouth/Throat:      Mouth: Mucous membranes are moist.      Pharynx: Oropharynx is clear.   Eyes:      Pupils: Pupils are equal, round, and reactive to light.   Cardiovascular:      Rate and Rhythm: Normal rate and regular rhythm.      Heart sounds: Normal heart sounds.   Pulmonary:      Effort: Pulmonary effort is normal. No respiratory distress.      Breath sounds: Normal breath sounds. No wheezing or rales.   Abdominal:      Palpations: Abdomen is soft.   Musculoskeletal:         General: Normal range of motion.      Cervical back: Normal range of motion and neck supple.   Skin:     General: Skin is warm and dry.   Neurological:      General: No focal deficit present.      Mental Status: He is alert.   Psychiatric:         Mood and Affect: Mood normal.          Behavior: Behavior normal.         Thought Content: Thought content normal.         Judgment: Judgment normal.         1. Acute pharyngitis due to other specified organisms    2. Non-seasonal allergic rhinitis due to pollen    3. Chronic cough    4. Hx of aortic valve replacement    5. Primary hypertension    6. Pure hypercholesterolemia    7. Paroxysmal atrial fibrillation    8. Type 2 diabetes mellitus without complication, without long-term current use of insulin    9. Malignant melanoma, unspecified site    10. Other specified disorders of parathyroid gland    11. Atherosclerotic heart disease of native coronary artery with other forms of angina pectoris        Plan:     Problem List Items Addressed This Visit          ENT    Acute pharyngitis due to other specified organisms - Primary    Non-seasonal allergic rhinitis due to pollen       Pulmonary    Chronic cough       Cardiac/Vascular    Hx of aortic valve replacement    Hypertension    Hyperlipidemia    Atrial fibrillation    Atherosclerotic heart disease of native coronary artery with other forms of angina pectoris       Oncology    Malignant melanoma, unspecified site       Endocrine    Other specified disorders of parathyroid gland    Type 2 diabetes mellitus without complication, without long-term current use of insulin     No follow-ups on file.  Has fu  Rx sent    I am having Shreyas PETERS Magda start on azithromycin, predniSONE, and benzonatate. I am also having him maintain his PACERONE, methocarbamoL, imiquimod, levothyroxine, ITCH-X, amLODIPine, lisinopriL, clindamycin, atorvastatin, ALPRAZolam, aspirin, tadalafiL, and HYDROcodone-acetaminophen. We will continue to administer lincomycin, dexAMETHasone, and methylPREDNISolone acetate.    Shreyas was seen today for cough.    Diagnoses and all orders for this visit:    Acute pharyngitis due to other specified organisms    Non-seasonal allergic rhinitis due to pollen    Chronic cough    Hx of aortic  valve replacement    Primary hypertension    Pure hypercholesterolemia    Paroxysmal atrial fibrillation    Type 2 diabetes mellitus without complication, without long-term current use of insulin    Malignant melanoma, unspecified site    Other specified disorders of parathyroid gland    Atherosclerotic heart disease of native coronary artery with other forms of angina pectoris    Other orders  -     lincomycin injection 600 mg  -     dexAMETHasone injection 4 mg  -     methylPREDNISolone acetate injection 80 mg  -     azithromycin (Z-UMM) 250 MG tablet; Take 2 tablets by mouth on day 1; Take 1 tablet by mouth on days 2-5  -     predniSONE (DELTASONE) 20 MG tablet; Take 2 tablets (40 mg total) by mouth once daily.  -     benzonatate (TESSALON) 200 MG capsule; Take 1 capsule (200 mg total) by mouth 3 (three) times daily as needed.      Medications Ordered This Encounter   Medications    azithromycin (Z-UMM) 250 MG tablet     Sig: Take 2 tablets by mouth on day 1; Take 1 tablet by mouth on days 2-5     Dispense:  6 tablet     Refill:  1    benzonatate (TESSALON) 200 MG capsule     Sig: Take 1 capsule (200 mg total) by mouth 3 (three) times daily as needed.     Dispense:  30 capsule     Refill:  3    dexAMETHasone injection 4 mg    lincomycin injection 600 mg    methylPREDNISolone acetate injection 80 mg    predniSONE (DELTASONE) 20 MG tablet     Sig: Take 2 tablets (40 mg total) by mouth once daily.     Dispense:  10 tablet     Refill:  0     [unfilled]  No orders of the defined types were placed in this encounter.

## 2024-01-29 RX ORDER — HYDROCODONE BITARTRATE AND ACETAMINOPHEN 10; 325 MG/1; MG/1
1 TABLET ORAL EVERY 6 HOURS PRN
Qty: 120 TABLET | Refills: 0 | Status: SHIPPED | OUTPATIENT
Start: 2024-01-29 | End: 2024-02-27 | Stop reason: SDUPTHER

## 2024-02-07 RX ORDER — ZOLPIDEM TARTRATE 10 MG/1
10 TABLET ORAL NIGHTLY PRN
Qty: 30 TABLET | Refills: 0 | Status: SHIPPED | OUTPATIENT
Start: 2024-02-07 | End: 2024-03-19 | Stop reason: SDUPTHER

## 2024-02-20 RX ORDER — TADALAFIL 20 MG/1
20 TABLET ORAL EVERY OTHER DAY
Qty: 10 TABLET | Refills: 0 | Status: SHIPPED | OUTPATIENT
Start: 2024-02-20 | End: 2024-04-01 | Stop reason: SDUPTHER

## 2024-02-27 RX ORDER — HYDROCODONE BITARTRATE AND ACETAMINOPHEN 10; 325 MG/1; MG/1
1 TABLET ORAL EVERY 6 HOURS PRN
Qty: 120 TABLET | Refills: 0 | Status: SHIPPED | OUTPATIENT
Start: 2024-02-27 | End: 2024-04-01 | Stop reason: SDUPTHER

## 2024-02-27 RX ORDER — AMLODIPINE BESYLATE 10 MG/1
10 TABLET ORAL DAILY
Qty: 90 TABLET | Refills: 3 | Status: SHIPPED | OUTPATIENT
Start: 2024-02-27

## 2024-03-19 RX ORDER — LISINOPRIL 10 MG/1
10 TABLET ORAL 2 TIMES DAILY
Qty: 180 TABLET | Refills: 3 | Status: SHIPPED | OUTPATIENT
Start: 2024-03-19

## 2024-03-19 RX ORDER — ZOLPIDEM TARTRATE 10 MG/1
10 TABLET ORAL NIGHTLY PRN
Qty: 90 TABLET | Refills: 1 | Status: SHIPPED | OUTPATIENT
Start: 2024-03-19 | End: 2024-09-17

## 2024-04-01 RX ORDER — TADALAFIL 20 MG/1
20 TABLET ORAL EVERY OTHER DAY
Qty: 10 TABLET | Refills: 0 | Status: SHIPPED | OUTPATIENT
Start: 2024-04-01 | End: 2024-06-12 | Stop reason: SDUPTHER

## 2024-04-01 RX ORDER — HYDROCODONE BITARTRATE AND ACETAMINOPHEN 10; 325 MG/1; MG/1
1 TABLET ORAL EVERY 6 HOURS PRN
Qty: 120 TABLET | Refills: 0 | Status: SHIPPED | OUTPATIENT
Start: 2024-04-01 | End: 2024-04-30 | Stop reason: SDUPTHER

## 2024-04-30 RX ORDER — HYDROCODONE BITARTRATE AND ACETAMINOPHEN 10; 325 MG/1; MG/1
1 TABLET ORAL EVERY 6 HOURS PRN
Qty: 120 TABLET | Refills: 0 | Status: SHIPPED | OUTPATIENT
Start: 2024-04-30 | End: 2024-06-03 | Stop reason: SDUPTHER

## 2024-06-03 PROCEDURE — 88305 TISSUE EXAM BY PATHOLOGIST: CPT | Mod: TC,SUR | Performed by: DERMATOLOGY

## 2024-06-03 PROCEDURE — 88305 TISSUE EXAM BY PATHOLOGIST: CPT | Mod: 26,,, | Performed by: PATHOLOGY

## 2024-06-03 RX ORDER — HYDROCODONE BITARTRATE AND ACETAMINOPHEN 10; 325 MG/1; MG/1
1 TABLET ORAL EVERY 6 HOURS PRN
Qty: 120 TABLET | Refills: 0 | Status: SHIPPED | OUTPATIENT
Start: 2024-06-03

## 2024-06-03 RX ORDER — ATORVASTATIN CALCIUM 80 MG/1
80 TABLET, FILM COATED ORAL DAILY
Qty: 90 TABLET | Refills: 3 | Status: SHIPPED | OUTPATIENT
Start: 2024-06-03

## 2024-06-04 ENCOUNTER — LAB REQUISITION (OUTPATIENT)
Dept: LAB | Facility: HOSPITAL | Age: 69
End: 2024-06-04
Attending: DERMATOLOGY
Payer: MEDICARE

## 2024-06-04 DIAGNOSIS — C44.41 BASAL CELL CARCINOMA OF SKIN OF SCALP AND NECK: ICD-10-CM

## 2024-06-05 ENCOUNTER — OFFICE VISIT (OUTPATIENT)
Dept: CARDIOLOGY | Facility: CLINIC | Age: 69
End: 2024-06-05
Payer: MEDICARE

## 2024-06-05 VITALS
BODY MASS INDEX: 30.04 KG/M2 | OXYGEN SATURATION: 97 % | WEIGHT: 221.81 LBS | SYSTOLIC BLOOD PRESSURE: 130 MMHG | DIASTOLIC BLOOD PRESSURE: 80 MMHG | HEART RATE: 89 BPM | HEIGHT: 72 IN

## 2024-06-05 DIAGNOSIS — I10 HYPERTENSION, UNSPECIFIED TYPE: ICD-10-CM

## 2024-06-05 DIAGNOSIS — Z95.2 HX OF AORTIC VALVE REPLACEMENT: ICD-10-CM

## 2024-06-05 DIAGNOSIS — I48.91 ATRIAL FIBRILLATION, UNSPECIFIED TYPE: Primary | ICD-10-CM

## 2024-06-05 DIAGNOSIS — E78.5 HYPERLIPIDEMIA, UNSPECIFIED HYPERLIPIDEMIA TYPE: ICD-10-CM

## 2024-06-05 DIAGNOSIS — I35.0 NONRHEUMATIC AORTIC VALVE STENOSIS: ICD-10-CM

## 2024-06-05 DIAGNOSIS — Z79.899 HIGH RISK MEDICATION USE: ICD-10-CM

## 2024-06-05 DIAGNOSIS — I25.118 ATHEROSCLEROTIC HEART DISEASE OF NATIVE CORONARY ARTERY WITH OTHER FORMS OF ANGINA PECTORIS: ICD-10-CM

## 2024-06-05 PROCEDURE — 93005 ELECTROCARDIOGRAM TRACING: CPT | Mod: PBBFAC | Performed by: INTERNAL MEDICINE

## 2024-06-05 PROCEDURE — 99214 OFFICE O/P EST MOD 30 MIN: CPT | Mod: S$PBB,,, | Performed by: NURSE PRACTITIONER

## 2024-06-05 PROCEDURE — 99215 OFFICE O/P EST HI 40 MIN: CPT | Mod: PBBFAC | Performed by: NURSE PRACTITIONER

## 2024-06-05 PROCEDURE — 93010 ELECTROCARDIOGRAM REPORT: CPT | Mod: S$PBB,,, | Performed by: INTERNAL MEDICINE

## 2024-06-05 RX ORDER — AMIODARONE HYDROCHLORIDE 200 MG/1
100 TABLET ORAL DAILY
Start: 2024-06-05

## 2024-06-05 NOTE — ASSESSMENT & PLAN NOTE
S/p CABG 8/5/2010 Dr. Krystal AGARWAL to the LAD and LIMA to the ZENIA Y graft with distal anastomosis to the diag; left radial artery to the PDA  [facility] 8/1/2022  Summary       The left ventricular systolic function was normal.  The left ventricular end diastolic pressure was normal.  The pre-procedure left ventricular end diastolic pressure was 20.  The ejection fraction was calculated to be 60%.  The left ventricular ejection fraction was grossly normal.  There was no mitral valve regurgitation.  The Dist Cx lesion was 50% stenosed.  The Prox LAD lesion was 80% stenosed.  The estimated blood loss was none.  There was three vessel coronary artery disease.  Zenia to OM is atrophiied with no sig disease in the native om.Recommendations     Routine post-cath care.   Continue medical management.   Reassurance.   Risk factor reductions.   ASA 81mg.   Tobacco cessation counseling.   Cardiac rehab referral.   Weight loss.   Statin therapy.     Asymptomatic  Continue ASA/Lipitor

## 2024-06-05 NOTE — ASSESSMENT & PLAN NOTE
Lab Results   Component Value Date    CHOL 178 10/02/2023    CHOL 149 09/30/2022    CHOL 154 10/19/2021     Lab Results   Component Value Date    HDL 57 10/02/2023    HDL 54 09/30/2022    HDL 53 10/19/2021     Lab Results   Component Value Date    LDLCALC 95 10/02/2023    LDLCALC 77 09/30/2022    LDLCALC 80 10/19/2021     Lab Results   Component Value Date    TRIG 130 10/02/2023    TRIG 89 09/30/2022    TRIG 104 10/19/2021       Lab Results   Component Value Date    CHOLHDL 3.1 10/02/2023    CHOLHDL 2.8 09/30/2022    CHOLHDL 2.9 10/19/2021     Lipitor 80 mg po daily  Lipids check in the am

## 2024-06-05 NOTE — ASSESSMENT & PLAN NOTE
Post op after AVR/CABG in 2010  No known recurrence  Amiodarone to maintain RSR   Decrease amiodarone to 100 mg po daily

## 2024-06-06 ENCOUNTER — TELEPHONE (OUTPATIENT)
Dept: PRIMARY CARE CLINIC | Facility: CLINIC | Age: 69
End: 2024-06-06
Payer: MEDICARE

## 2024-06-06 LAB
OHS QRS DURATION: 86 MS
OHS QTC CALCULATION: 447 MS

## 2024-06-12 RX ORDER — AZITHROMYCIN 250 MG/1
250 TABLET, FILM COATED ORAL DAILY
Qty: 6 TABLET | Refills: 1 | Status: SHIPPED | OUTPATIENT
Start: 2024-06-12

## 2024-06-12 RX ORDER — AZITHROMYCIN 250 MG/1
250 TABLET, FILM COATED ORAL DAILY
COMMUNITY
Start: 2024-01-09 | End: 2024-06-12 | Stop reason: SDUPTHER

## 2024-06-12 RX ORDER — TADALAFIL 20 MG/1
20 TABLET ORAL EVERY OTHER DAY
Qty: 10 TABLET | Refills: 11 | Status: SHIPPED | OUTPATIENT
Start: 2024-06-12

## 2024-06-26 PROCEDURE — 88305 TISSUE EXAM BY PATHOLOGIST: CPT | Mod: 26,,, | Performed by: PATHOLOGY

## 2024-06-26 PROCEDURE — 88305 TISSUE EXAM BY PATHOLOGIST: CPT | Mod: TC,SUR | Performed by: DERMATOLOGY

## 2024-06-26 PROCEDURE — 88342 IMHCHEM/IMCYTCHM 1ST ANTB: CPT | Mod: 26,,, | Performed by: PATHOLOGY

## 2024-06-27 ENCOUNTER — LAB REQUISITION (OUTPATIENT)
Dept: LAB | Facility: HOSPITAL | Age: 69
End: 2024-06-27
Payer: MEDICARE

## 2024-06-27 DIAGNOSIS — D49.2 NEOPLASM OF UNSPECIFIED BEHAVIOR OF BONE, SOFT TISSUE, AND SKIN: ICD-10-CM

## 2024-06-28 LAB
DHEA SERPL-MCNC: NORMAL
ESTROGEN SERPL-MCNC: NORMAL PG/ML
INSULIN SERPL-ACNC: NORMAL U[IU]/ML
LAB AP GROSS DESCRIPTION: NORMAL
LAB AP LABORATORY NOTES: NORMAL
LAB AP SPEC A DDX: NORMAL
LAB AP SPEC A MORPHOLOGY: NORMAL
LAB AP SPEC A PROCEDURE: NORMAL
LAB AP SPEC B DDX: NORMAL
LAB AP SPEC B MORPHOLOGY: NORMAL
LAB AP SPEC B PROCEDURE: NORMAL
LAB AP SPEC C DDX: NORMAL
LAB AP SPEC C MORPHOLOGY: NORMAL
LAB AP SPEC C PROCEDURE: NORMAL
T3RU NFR SERPL: NORMAL %

## 2024-07-01 RX ORDER — HYDROCODONE BITARTRATE AND ACETAMINOPHEN 10; 325 MG/1; MG/1
1 TABLET ORAL EVERY 6 HOURS PRN
Qty: 120 TABLET | Refills: 0 | Status: SHIPPED | OUTPATIENT
Start: 2024-07-01

## 2024-07-01 RX ORDER — ALPRAZOLAM 0.25 MG/1
0.25 TABLET ORAL DAILY PRN
Qty: 90 TABLET | Refills: 3 | Status: SHIPPED | OUTPATIENT
Start: 2024-07-01 | End: 2025-06-26

## 2024-07-09 DIAGNOSIS — Z71.89 COMPLEX CARE COORDINATION: ICD-10-CM

## 2024-07-31 RX ORDER — HYDROCODONE BITARTRATE AND ACETAMINOPHEN 10; 325 MG/1; MG/1
1 TABLET ORAL EVERY 6 HOURS PRN
Qty: 120 TABLET | Refills: 0 | Status: SHIPPED | OUTPATIENT
Start: 2024-07-31

## 2024-08-12 DIAGNOSIS — Z12.11 SCREENING FOR COLON CANCER: Primary | ICD-10-CM

## 2024-08-13 RX ORDER — POLYETHYLENE GLYCOL 3350, SODIUM SULFATE ANHYDROUS, SODIUM BICARBONATE, SODIUM CHLORIDE, POTASSIUM CHLORIDE 236; 22.74; 6.74; 5.86; 2.97 G/4L; G/4L; G/4L; G/4L; G/4L
4 POWDER, FOR SOLUTION ORAL ONCE
Qty: 4000 ML | Refills: 0 | Status: SHIPPED | OUTPATIENT
Start: 2024-08-13 | End: 2024-08-13

## 2024-08-15 PROCEDURE — 88305 TISSUE EXAM BY PATHOLOGIST: CPT | Mod: TC,SUR | Performed by: DERMATOLOGY

## 2024-08-15 PROCEDURE — 88305 TISSUE EXAM BY PATHOLOGIST: CPT | Mod: 26,,, | Performed by: PATHOLOGY

## 2024-08-16 ENCOUNTER — LAB REQUISITION (OUTPATIENT)
Dept: LAB | Facility: HOSPITAL | Age: 69
End: 2024-08-16
Attending: DERMATOLOGY
Payer: MEDICARE

## 2024-08-16 DIAGNOSIS — D49.2 NEOPLASM OF UNSPECIFIED BEHAVIOR OF BONE, SOFT TISSUE, AND SKIN: ICD-10-CM

## 2024-08-26 ENCOUNTER — HOSPITAL ENCOUNTER (OUTPATIENT)
Dept: GASTROENTEROLOGY | Facility: HOSPITAL | Age: 69
Discharge: HOME | End: 2024-08-26
Attending: INTERNAL MEDICINE
Payer: MEDICARE

## 2024-08-26 ENCOUNTER — ANESTHESIA (OUTPATIENT)
Dept: GASTROENTEROLOGY | Facility: HOSPITAL | Age: 69
End: 2024-08-26
Payer: MEDICARE

## 2024-08-26 ENCOUNTER — ANESTHESIA EVENT (OUTPATIENT)
Dept: GASTROENTEROLOGY | Facility: HOSPITAL | Age: 69
End: 2024-08-26
Payer: MEDICARE

## 2024-08-26 VITALS
HEART RATE: 65 BPM | WEIGHT: 221 LBS | SYSTOLIC BLOOD PRESSURE: 126 MMHG | RESPIRATION RATE: 8 BRPM | OXYGEN SATURATION: 99 % | TEMPERATURE: 97 F | BODY MASS INDEX: 29.93 KG/M2 | HEIGHT: 72 IN | DIASTOLIC BLOOD PRESSURE: 81 MMHG

## 2024-08-26 DIAGNOSIS — Z12.11 SCREENING FOR COLON CANCER: ICD-10-CM

## 2024-08-26 PROCEDURE — 45385 COLONOSCOPY W/LESION REMOVAL: CPT | Mod: PT,,, | Performed by: INTERNAL MEDICINE

## 2024-08-26 PROCEDURE — 88305 TISSUE EXAM BY PATHOLOGIST: CPT | Mod: TC,91,SUR | Performed by: INTERNAL MEDICINE

## 2024-08-26 PROCEDURE — 37000008 HC ANESTHESIA 1ST 15 MINUTES

## 2024-08-26 PROCEDURE — 25000003 PHARM REV CODE 250: Performed by: NURSE ANESTHETIST, CERTIFIED REGISTERED

## 2024-08-26 PROCEDURE — D9220A PRA ANESTHESIA: Mod: PT,,, | Performed by: NURSE ANESTHETIST, CERTIFIED REGISTERED

## 2024-08-26 PROCEDURE — 27201423 OPTIME MED/SURG SUP & DEVICES STERILE SUPPLY

## 2024-08-26 PROCEDURE — 88305 TISSUE EXAM BY PATHOLOGIST: CPT | Mod: 26,,, | Performed by: PATHOLOGY

## 2024-08-26 PROCEDURE — 37000009 HC ANESTHESIA EA ADD 15 MINS

## 2024-08-26 PROCEDURE — 45385 COLONOSCOPY W/LESION REMOVAL: CPT | Mod: PT | Performed by: INTERNAL MEDICINE

## 2024-08-26 PROCEDURE — 63600175 PHARM REV CODE 636 W HCPCS: Performed by: NURSE ANESTHETIST, CERTIFIED REGISTERED

## 2024-08-26 PROCEDURE — 27000284 HC CANNULA NASAL: Performed by: NURSE ANESTHETIST, CERTIFIED REGISTERED

## 2024-08-26 RX ORDER — SODIUM CHLORIDE, SODIUM LACTATE, POTASSIUM CHLORIDE, CALCIUM CHLORIDE 600; 310; 30; 20 MG/100ML; MG/100ML; MG/100ML; MG/100ML
INJECTION, SOLUTION INTRAVENOUS CONTINUOUS
Status: CANCELLED | OUTPATIENT
Start: 2024-08-26

## 2024-08-26 RX ORDER — SODIUM CHLORIDE 0.9 % (FLUSH) 0.9 %
10 SYRINGE (ML) INJECTION EVERY 6 HOURS PRN
Status: CANCELLED | OUTPATIENT
Start: 2024-08-26

## 2024-08-26 RX ORDER — LIDOCAINE HYDROCHLORIDE 20 MG/ML
INJECTION INTRAVENOUS
Status: DISCONTINUED | OUTPATIENT
Start: 2024-08-26 | End: 2024-08-26

## 2024-08-26 RX ORDER — PROPOFOL 10 MG/ML
VIAL (ML) INTRAVENOUS
Status: DISCONTINUED | OUTPATIENT
Start: 2024-08-26 | End: 2024-08-26

## 2024-08-26 RX ADMIN — PROPOFOL 50 MG: 10 INJECTION, EMULSION INTRAVENOUS at 08:08

## 2024-08-26 RX ADMIN — SODIUM CHLORIDE: 9 INJECTION, SOLUTION INTRAVENOUS at 08:08

## 2024-08-26 RX ADMIN — LIDOCAINE HYDROCHLORIDE 50 MG: 20 INJECTION, SOLUTION INTRAVENOUS at 08:08

## 2024-08-26 NOTE — ANESTHESIA POSTPROCEDURE EVALUATION
Anesthesia Post Evaluation    Patient: Shreyas Man    Procedure(s) Performed: * No procedures listed *    Final Anesthesia Type: general      Patient location during evaluation: GI PACU  Patient participation: Yes- Able to Participate  Level of consciousness: awake and alert  Post-procedure vital signs: reviewed and stable  Pain management: adequate  Airway patency: patent    PONV status at discharge: No PONV  Anesthetic complications: no      Cardiovascular status: stable  Respiratory status: unassisted, spontaneous ventilation and room air  Hydration status: euvolemic  Follow-up not needed.  Comments: See nursing notes for pain/rita score upon release to recivery            Vitals Value Taken Time   /81 08/26/24 0924   Temp 98 08/26/24 1251   Pulse 65 08/26/24 0926   Resp 14 08/26/24 0925   SpO2 99 % 08/26/24 0926   Vitals shown include unfiled device data.      Event Time   Out of Recovery 09:50:13         Pain/Rita Score: Rita Score: 10 (8/26/2024  9:03 AM)

## 2024-08-26 NOTE — TRANSFER OF CARE
Anesthesia Transfer of Care Note    Patient: Shreyas Man    Procedure(s) Performed: * No procedures listed *    Patient location: GI    Anesthesia Type: general    Transport from OR: Transported from OR on room air with adequate spontaneous ventilation. Continuous ECG monitoring in transport. Continuous SpO2 monitoring in transport    Post pain: adequate analgesia    Post assessment: no apparent anesthetic complications and tolerated procedure well    Post vital signs: stable    Level of consciousness: responds to stimulation and sedated    Nausea/Vomiting: no nausea/vomiting    Complications: none    Transfer of care protocol was followed      Last vitals: Visit Vitals  /72 (BP Location: Left arm, Patient Position: Lying)   Pulse 65   Temp 36.1 °C (97 °F) (Skin)   Resp 18   Ht 6' (1.829 m)   Wt 100.2 kg (221 lb)   SpO2 98%   BMI 29.97 kg/m²

## 2024-08-26 NOTE — DISCHARGE INSTRUCTIONS
Procedure Date  8/26/24     Impression  Overall Impression:   3 subcentimeter polyps were removed with cold snare  Scattered diverticulosis in the sigmoid colon  Hemorrhoids        Recommendation  Await pathology results   Repeat colonoscopy in 3 years         - Discharge patient to home  - Advance diet as tolerated  - Continue present medications  - Patient has a contact number available for emergencies. The signs and symptoms of potential delayed complications were discussed with the patient. Return to normal activities tomorrow. Written discharge instructions were provided to the patient.    THE NURSE WILL CALL YOU WITH YOUR BIOPSY RESULTS IN A FEW DAYS. IF YOU HAVE  [facility] MYCHART YOUR RESULTS WILL APPEAR THERE AS WELL.  NO DRIVING, OPERATING EQUIPMENT, OR SIGNING LEGAL DOCUMENTS FOR 24 HOURS.

## 2024-08-26 NOTE — ANESTHESIA PREPROCEDURE EVALUATION
2024  Shreyas Man is a 69 y.o., male.      Pre-op Assessment    I have reviewed the Patient Summary Reports.     I have reviewed the Nursing Notes. I have reviewed the NPO Status.   I have reviewed the Medications.     Review of Systems  Anesthesia Hx:  No problems with previous Anesthesia                  Past Medical History:   Diagnosis Date    Anemia     Arthritis     Atrial fibrillation     Cancer     Encounter for blood transfusion     GERD (gastroesophageal reflux disease)     Hyperlipidemia     Hypertension     Sleep apnea     Thyroid disease     Type 2 diabetes mellitus without complication, without long-term current use of insulin 2024       Past Surgical History:   Procedure Laterality Date    CARDIAC CATHETERIZATION      CARDIAC VALVE SURGERY      CORONARY ARTERY BYPASS GRAFT      LEFT HEART CATHETERIZATION Left 2022    Procedure: Left heart cath;  Surgeon: Tristan Pham MD;  Location: [facility];  Service: Cardiology;  Laterality: Left;  NO LV GRAM. HE HAS A BIOPROSTHETIC AO VALVE.    SPINE SURGERY             Social History     Socioeconomic History    Marital status:    Tobacco Use    Smoking status: Former     Current packs/day: 0.00     Types: Cigarettes     Quit date: 2010     Years since quittin.2    Smokeless tobacco: Never    Tobacco comments:     quit 10+ years ago   Substance and Sexual Activity    Alcohol use: Never    Drug use: Never       Current Outpatient Medications   Medication Sig Dispense Refill    ALPRAZolam (XANAX) 0.25 MG tablet Take 1 tablet (0.25 mg total) by mouth daily as needed for Anxiety. 90 tablet 3    amiodarone (PACERONE) 200 MG Tab Take 0.5 tablets (100 mg total) by mouth once daily.      amLODIPine (NORVASC) 10 MG tablet Take 1 tablet (10 mg total) by mouth once daily. 90 tablet 3    aspirin (ECOTRIN) 81 MG EC  tablet Take 1 tablet (81 mg total) by mouth once daily. 90 tablet 3    atorvastatin (LIPITOR) 80 MG tablet Take 1 tablet (80 mg total) by mouth once daily. 90 tablet 3    azithromycin (Z-UMM) 250 MG tablet Take 1 tablet (250 mg total) by mouth once daily. 6 tablet 1    HYDROcodone-acetaminophen (NORCO)  mg per tablet Take 1 tablet by mouth every 6 (six) hours as needed for Pain. 120 tablet 0    imiquimod (ALDARA) 5 % cream Apply topically every evening.      levothyroxine (SYNTHROID) 75 MCG tablet Take 1 tablet (75 mcg total) by mouth before breakfast. 90 tablet 3    lisinopriL 10 MG tablet Take 1 tablet (10 mg total) by mouth 2 (two) times daily. 180 tablet 3    methocarbamoL (ROBAXIN) 750 MG Tab Take two tablets 4 times a day as needed. (Patient not taking: Reported on 6/5/2024) 240 tablet 1    pramoxine-benzyl alcohol (ITCH-X) 1-10 % Spry Apply 1 spray topically 4 (four) times daily as needed. 59.1 mL 5    tadalafiL (CIALIS) 20 MG Tab Take 1 tablet (20 mg total) by mouth every other day. 10 tablet 11    zolpidem (AMBIEN) 10 mg Tab Take 1 tablet (10 mg total) by mouth nightly as needed. 90 tablet 1     No current facility-administered medications for this encounter.       Review of patient's allergies indicates:   Allergen Reactions    Levaquin [levofloxacin]     Toradol [ketorolac]     Wasp sting [allergen ext-venom-honey bee]     Wasp venom Itching and Swelling    Zyrtec [cetirizine]     Tramadol hcl Itching        Chemistry        Component Value Date/Time     06/06/2024 0725    K 4.6 06/06/2024 0725     (H) 06/06/2024 0725    CO2 30 06/06/2024 0725    BUN 18 06/06/2024 0725    CREATININE 0.90 06/06/2024 0725    GLU 95 06/06/2024 0725        Component Value Date/Time    CALCIUM 10.2 (H) 06/06/2024 0725    ALKPHOS 90 06/06/2024 0725    AST 33 06/06/2024 0725    ALT 48 06/06/2024 0725    BILITOT 1.8 (H) 06/06/2024 0725    EGFRNONAA 100 07/28/2022 0804        Lab Results   Component Value Date     WBC 8.24 10/02/2023    HGB 17.3 10/02/2023    HCT 51.5 10/02/2023     10/02/2023     Results for orders placed or performed in visit on 06/05/24   EKG 12-lead    Collection Time: 06/05/24  2:11 PM   Result Value Ref Range    QRS Duration 86 ms    [facility] QTC Calculation 447 ms    Narrative    Test Reason : I48.91,    Vent. Rate : 076 BPM     Atrial Rate : 076 BPM     P-R Int : 172 ms          QRS Dur : 086 ms      QT Int : 398 ms       P-R-T Axes : 071 071 040 degrees     QTc Int : 447 ms    Normal sinus rhythm with sinus arrhythmia  Nonspecific ST and T wave abnormality  Abnormal ECG  When compared with ECG of 05-DEC-2023 13:20,  No significant change was found  Confirmed by Tristan Pham MD [id number]) on 6/6/2024 8:44:41 AM    Referred By:             Confirmed By:Tristan Pham MD        Physical Exam  General: Well nourished, Cooperative, Alert and Oriented    Airway:  Mallampati: II   Mouth Opening: Normal  TM Distance: Normal  Tongue: Normal    Dental:  Intact        Anesthesia Plan  Type of Anesthesia, risks & benefits discussed:    Anesthesia Type: Gen Natural Airway  Intra-op Monitoring Plan: Standard ASA Monitors  Post Op Pain Control Plan: multimodal analgesia  Induction:  IV  Informed Consent: Informed consent signed with the Patient and all parties understand the risks and agree with anesthesia plan.  All questions answered. Patient consented to blood products? Yes  ASA Score: 3  Day of Surgery Review of History & Physical: H&P Update referred to the surgeon/provider.    Ready For Surgery From Anesthesia Perspective.     .

## 2024-08-26 NOTE — H&P
History & Physical - Short Stay  Gastroenterology      SUBJECTIVE:     Procedure: Colonoscopy    Chief Complaint/Indication for Procedure: Previous polyps    (Not in a hospital admission)      Review of patient's allergies indicates:   Allergen Reactions    Levaquin [levofloxacin]     Toradol [ketorolac]     Wasp sting [allergen ext-venom-honey bee]     Wasp venom Itching and Swelling    Zyrtec [cetirizine]     Tramadol hcl Itching        Past Medical History:   Diagnosis Date    Anemia     Arthritis     Atrial fibrillation     Cancer     Encounter for blood transfusion     GERD (gastroesophageal reflux disease)     Hyperlipidemia     Hypertension     Sleep apnea     Thyroid disease     Type 2 diabetes mellitus without complication, without long-term current use of insulin 2024     Past Surgical History:   Procedure Laterality Date    CARDIAC CATHETERIZATION      CARDIAC VALVE SURGERY      CORONARY ARTERY BYPASS GRAFT      LEFT HEART CATHETERIZATION Left 2022    Procedure: Left heart cath;  Surgeon: Tristan Pham MD;  Location: [facility] CATH [facility];  Service: Cardiology;  Laterality: Left;  NO LV GRAM. HE HAS A BIOPROSTHETIC AO VALVE.    SPINE SURGERY       Family History   Problem Relation Name Age of Onset    Heart attack Mother      Heart attack Father      Heart disease Father       Social History     Tobacco Use    Smoking status: Former     Current packs/day: 0.00     Types: Cigarettes     Quit date: 2010     Years since quittin.2    Smokeless tobacco: Never    Tobacco comments:     quit 10+ years ago   Substance Use Topics    Alcohol use: Never    Drug use: Never         OBJECTIVE:     Vital Signs (Most Recent)  Temp: 97.7 °F (36.5 °C) (24)  Pulse: 82 (24)  Resp: 15 (24)  BP: (!) 154/98 (24)  SpO2: 96 % (24)    Physical Exam:                                                       GENERAL:  Comfortable, in no acute distress.                                  HEENT EXAM:  Nonicteric.  No adenopathy.  Oropharynx is clear.               NECK:  Supple.                                                               LUNGS:  Clear.                                                               CARDIAC:  Regular rate and rhythm.  S1, S2.  No murmur.                      ABDOMEN:  Soft, positive bowel sounds, nontender.  No hepatosplenomegaly or masses.  No rebound or guarding.                                             EXTREMITIES:  No edema.     MENTAL STATUS:  Normal, alert and oriented.      ASSESSMENT/PLAN:     Assessment: Previous polyps    Plan: Colonoscopy

## 2024-08-27 LAB
ESTROGEN SERPL-MCNC: NORMAL PG/ML
INSULIN SERPL-ACNC: NORMAL U[IU]/ML
LAB AP GROSS DESCRIPTION: NORMAL
LAB AP LABORATORY NOTES: NORMAL
T3RU NFR SERPL: NORMAL %

## 2024-08-29 DIAGNOSIS — M54.9 BACK PAIN, UNSPECIFIED BACK LOCATION, UNSPECIFIED BACK PAIN LATERALITY, UNSPECIFIED CHRONICITY: Primary | ICD-10-CM

## 2024-08-29 RX ORDER — HYDROCODONE BITARTRATE AND ACETAMINOPHEN 10; 325 MG/1; MG/1
1 TABLET ORAL EVERY 6 HOURS PRN
Qty: 120 TABLET | Refills: 0 | Status: CANCELLED | OUTPATIENT
Start: 2024-08-29

## 2024-08-29 RX ORDER — HYDROCODONE BITARTRATE AND ACETAMINOPHEN 10; 325 MG/1; MG/1
1 TABLET ORAL EVERY 6 HOURS PRN
Qty: 120 TABLET | Refills: 0 | Status: SHIPPED | OUTPATIENT
Start: 2024-08-29

## 2024-09-09 PROCEDURE — 88305 TISSUE EXAM BY PATHOLOGIST: CPT | Mod: 26,,, | Performed by: PATHOLOGY

## 2024-09-09 PROCEDURE — 88305 TISSUE EXAM BY PATHOLOGIST: CPT | Mod: TC,SUR | Performed by: DERMATOLOGY

## 2024-09-10 ENCOUNTER — LAB REQUISITION (OUTPATIENT)
Dept: LAB | Facility: HOSPITAL | Age: 69
End: 2024-09-10
Attending: DERMATOLOGY
Payer: MEDICARE

## 2024-09-10 DIAGNOSIS — C44.629 SQUAMOUS CELL CARCINOMA OF SKIN OF LEFT UPPER LIMB, INCLUDING SHOULDER: ICD-10-CM

## 2024-09-16 ENCOUNTER — TELEPHONE (OUTPATIENT)
Dept: GASTROENTEROLOGY | Facility: CLINIC | Age: 69
End: 2024-09-16
Payer: MEDICARE

## 2024-09-16 RX ORDER — ZOLPIDEM TARTRATE 10 MG/1
10 TABLET ORAL NIGHTLY PRN
Qty: 90 TABLET | Refills: 1 | Status: SHIPPED | OUTPATIENT
Start: 2024-09-16 | End: 2025-03-17

## 2024-09-16 NOTE — TELEPHONE ENCOUNTER
----- Message from Ronnell Mittal MD sent at 9/13/2024 11:29 AM CDT -----  Please advise patient that polyp pathology was reviewed and is benign and is the adenomatous type which is a precancerous/risk factor for cancer. Repeat colonoscopy recommended in 3 years. Place reminder in system for repeat colonoscopy.

## 2024-09-19 ENCOUNTER — HOSPITAL ENCOUNTER (OUTPATIENT)
Dept: RADIOLOGY | Facility: HOSPITAL | Age: 69
Discharge: HOME | End: 2024-09-19
Attending: ANESTHESIOLOGY
Payer: MEDICARE

## 2024-09-19 DIAGNOSIS — M25.551 BILATERAL HIP PAIN: ICD-10-CM

## 2024-09-19 DIAGNOSIS — M25.552 BILATERAL HIP PAIN: ICD-10-CM

## 2024-09-19 PROCEDURE — 73522 X-RAY EXAM HIPS BI 3-4 VIEWS: CPT | Mod: TC

## 2024-09-19 PROCEDURE — 73522 X-RAY EXAM HIPS BI 3-4 VIEWS: CPT | Mod: 26,,, | Performed by: RADIOLOGY

## 2024-11-19 PROCEDURE — 88305 TISSUE EXAM BY PATHOLOGIST: CPT | Mod: 26,,, | Performed by: PATHOLOGY

## 2024-11-19 PROCEDURE — 88305 TISSUE EXAM BY PATHOLOGIST: CPT | Mod: TC,SUR | Performed by: DERMATOLOGY

## 2024-11-20 ENCOUNTER — LAB REQUISITION (OUTPATIENT)
Dept: LAB | Facility: HOSPITAL | Age: 69
End: 2024-11-20
Attending: DERMATOLOGY
Payer: MEDICARE

## 2024-11-20 DIAGNOSIS — D49.2 NEOPLASM OF UNSPECIFIED BEHAVIOR OF BONE, SOFT TISSUE, AND SKIN: ICD-10-CM

## 2024-11-21 LAB
DHEA SERPL-MCNC: NORMAL
ESTROGEN SERPL-MCNC: NORMAL PG/ML
INSULIN SERPL-ACNC: NORMAL U[IU]/ML
LAB AP GROSS DESCRIPTION: NORMAL
LAB AP LABORATORY NOTES: NORMAL
LAB AP SPEC A DDX: NORMAL
LAB AP SPEC A MORPHOLOGY: NORMAL
LAB AP SPEC A PROCEDURE: NORMAL
LAB AP SPEC B DDX: NORMAL
LAB AP SPEC B MORPHOLOGY: NORMAL
LAB AP SPEC B PROCEDURE: NORMAL
T3RU NFR SERPL: NORMAL %

## 2024-12-09 ENCOUNTER — OFFICE VISIT (OUTPATIENT)
Dept: CARDIOLOGY | Facility: CLINIC | Age: 69
End: 2024-12-09
Payer: MEDICARE

## 2024-12-09 VITALS
DIASTOLIC BLOOD PRESSURE: 70 MMHG | SYSTOLIC BLOOD PRESSURE: 122 MMHG | HEART RATE: 108 BPM | WEIGHT: 229 LBS | HEIGHT: 72 IN | BODY MASS INDEX: 31.02 KG/M2 | OXYGEN SATURATION: 97 %

## 2024-12-09 DIAGNOSIS — R00.2 PALPITATIONS: ICD-10-CM

## 2024-12-09 DIAGNOSIS — E78.5 HYPERLIPIDEMIA, UNSPECIFIED HYPERLIPIDEMIA TYPE: ICD-10-CM

## 2024-12-09 DIAGNOSIS — Z95.2 S/P AVR: ICD-10-CM

## 2024-12-09 DIAGNOSIS — I10 HYPERTENSION, UNSPECIFIED TYPE: ICD-10-CM

## 2024-12-09 DIAGNOSIS — R94.31 ABNORMAL ELECTROCARDIOGRAM (ECG) (EKG): ICD-10-CM

## 2024-12-09 DIAGNOSIS — R07.89 ATYPICAL CHEST PAIN: ICD-10-CM

## 2024-12-09 DIAGNOSIS — I48.91 ATRIAL FIBRILLATION, UNSPECIFIED TYPE: Primary | ICD-10-CM

## 2024-12-09 DIAGNOSIS — R07.9 CHEST PAIN, UNSPECIFIED TYPE: ICD-10-CM

## 2024-12-09 DIAGNOSIS — Z95.2 HX OF AORTIC VALVE REPLACEMENT: ICD-10-CM

## 2024-12-09 DIAGNOSIS — I35.0 NONRHEUMATIC AORTIC VALVE STENOSIS: ICD-10-CM

## 2024-12-09 PROCEDURE — 99999 PR PBB SHADOW E&M-EST. PATIENT-LVL V: CPT | Mod: PBBFAC,,, | Performed by: NURSE PRACTITIONER

## 2024-12-09 PROCEDURE — 99215 OFFICE O/P EST HI 40 MIN: CPT | Mod: S$PBB,,, | Performed by: NURSE PRACTITIONER

## 2024-12-09 PROCEDURE — 99215 OFFICE O/P EST HI 40 MIN: CPT | Mod: PBBFAC | Performed by: NURSE PRACTITIONER

## 2024-12-09 NOTE — ASSESSMENT & PLAN NOTE
Post op after AVR/CABG in 2010  No known recurrence  Amiodarone to maintain RSR   Continue  amiodarone to 100 mg po daily

## 2024-12-09 NOTE — PROGRESS NOTES
"PCP: Miguel Angel Gregory III, DO    Referring Provider:   Chief Complaint   Patient presents with    Atrial Fibrillation    Chest Pain     At times       Subjective:   Shreyas Man is a 69 y.o. male with hx of AS s/p AVR with a bioprosthetic valve, h/o post op a fib in 2010 without recurrence on amiodarone to maintain RSR, CAD s/p CABG, HTN, and HLD,  who presents for routine follow up. Patient reports 3 episodes of chest discomfort in the last 6 months that he describes a "deep soreness" of the left side of his chest. There is no triggering or relieving factors identified and it lasts for no more than one minute. He has had no chest discomfort in the last 2-3 weeks. He states he had a similar discomfort 2 years ago prior to his LHC which demonstrated patent but atrophied PERLA to the OM with no significant disease of the native OM (approx 50% distal Lcx lesion); patent LIMA to the LAD and patent Left radial artery graft the PDA. He also reports occasional palpitations with exertion described as heart racing.  Patient ran out of his thyroid hormone replacement therapy approx one month ago'      His EKG today demonstrated sinus tachycardia with ST changes in the inferior and anterolateral leads. The patient is not having active chest pain. He does not want LHC. I reviewed the EKG and case with Dr. Corea.     6/5/2024 presents for routine follow up. He is doing well and denies complaints.     12/5/2023  presents for routine follow up. He remains physically active and asymptomatic.     6/5/2023 presents for routine follow up. He remains physically active with out issues.   3/1/2023 presents for routine follow up. He states he is doing will but does have indigestion at times. He has no exertional symptoms.         Fhx:  Family History   Problem Relation Name Age of Onset    Heart attack Mother      Heart attack Father      Heart disease Father       Shx:   Social History     Socioeconomic History    Marital status: "    Tobacco Use    Smoking status: Former     Current packs/day: 0.00     Types: Cigarettes     Quit date: 2010     Years since quittin.5    Smokeless tobacco: Never    Tobacco comments:     quit 10+ years ago   Substance and Sexual Activity    Alcohol use: Never    Drug use: Never       EKG   2024 sinus tachycardia;  bpm; LVH with ST chagnes in the inferior and anterolateral leads (reviewed with Dr. Corea)  2024 RSR with sinus arrhythmia; HR 76 bpm; NS ST&TWA; when compared with EKG 2023 no significant change was found.  Results for orders placed or performed in visit on 24   EKG 12-lead    Collection Time: 24  2:11 PM   Result Value Ref Range    QRS Duration 86 ms    [facility] QTC Calculation 447 ms    Narrative    Test Reason : I48.91,    Vent. Rate : 076 BPM     Atrial Rate : 076 BPM     P-R Int : 172 ms          QRS Dur : 086 ms      QT Int : 398 ms       P-R-T Axes : 071 071 040 degrees     QTc Int : 447 ms    Normal sinus rhythm with sinus arrhythmia  Nonspecific ST and T wave abnormality  Abnormal ECG  When compared with ECG of 05-DEC-2023 13:20,  No significant change was found  Confirmed by Tristan Pham MD ([id number]) on 2024 8:44:41 AM    Referred By:             Confirmed By:Tristan Pham MD      Results for orders placed during the hospital encounter of 23    Echo    Interpretation Summary    Left Ventricle: The left ventricle is normal in size. Increased wall thickness. Septal thickening. Normal wall motion. There is hyperdynamic systolic function with a visually estimated ejection fraction of 70 - 75%. There is normal diastolic function.    Right Ventricle: Normal right ventricular cavity size. Systolic function is borderline low.    Aortic Valve: There is a porcine prosthetic valve in the aortic position.    Mitral Valve: There is no stenosis.    Tricuspid Valve: There is moderate regurgitation.    Pulmonic Valve: There is mild regurgitation.     IVC/SVC: Normal venous pressure at 3 mmHg.    ECHO Results for orders placed during the hospital encounter of 02/20/23    Echo Saline Bubble? No    Interpretation Summary  · The left ventricle is normal in size with mild concentric hypertrophy and normal systolic function.  · The estimated ejection fraction is 65%.  · Left ventricular diastolic dysfunction.  · Normal right ventricular size.  · There is a bioprosthetic aortic valve present.  · The aortic valve mean gradient is 21 mmHg with a dimensionless index of 0.39.  · Mild mitral regurgitation.  · Mild tricuspid regurgitation.  · Mild pulmonic regurgitation.  · Mild left atrial enlargement.    [facility] Results for orders placed during the hospital encounter of 08/01/22    Cardiac catheterization    Conclusion  · The left ventricular systolic function was normal.  · The left ventricular end diastolic pressure was normal.  · The pre-procedure left ventricular end diastolic pressure was 20.  · The ejection fraction was calculated to be 60%.  · The left ventricular ejection fraction was grossly normal.  · There was no mitral valve regurgitation.  · The Dist Cx lesion was 50% stenosed.  · The Prox LAD lesion was 80% stenosed.  · The estimated blood loss was none.  · There was three vessel coronary artery disease.  · Zenia to OM is atrophiied with no sig disease in the native om.    The procedure log was documented by Documenter: RT Lena and verified by Tristan Pham MD.    Date: 8/1/2022  Time: 12:24 PM        Lab Results   Component Value Date     06/06/2024    K 4.6 06/06/2024     (H) 06/06/2024    CO2 30 06/06/2024    BUN 18 06/06/2024    CREATININE 0.90 06/06/2024    CALCIUM 10.2 (H) 06/06/2024    ANIONGAP 5 (L) 06/06/2024    EGFRNONAA 100 07/28/2022       Lab Results   Component Value Date    CHOL 133 06/06/2024    CHOL 178 10/02/2023    CHOL 149 09/30/2022     Lab Results   Component Value Date    HDL 54 06/06/2024    HDL 57 10/02/2023     HDL 54 09/30/2022     Lab Results   Component Value Date    LDLCALC 64 06/06/2024    LDLCALC 95 10/02/2023    LDLCALC 77 09/30/2022     Lab Results   Component Value Date    TRIG 77 06/06/2024    TRIG 130 10/02/2023    TRIG 89 09/30/2022     Lab Results   Component Value Date    CHOLHDL 2.5 06/06/2024    CHOLHDL 3.1 10/02/2023    CHOLHDL 2.8 09/30/2022       Lab Results   Component Value Date    WBC 8.24 10/02/2023    HGB 17.3 10/02/2023    HCT 51.5 10/02/2023    MCV 95.9 10/02/2023     10/02/2023           Current Outpatient Medications:     ALPRAZolam (XANAX) 0.25 MG tablet, Take 1 tablet (0.25 mg total) by mouth daily as needed for Anxiety., Disp: 90 tablet, Rfl: 3    amiodarone (PACERONE) 200 MG Tab, Take 0.5 tablets (100 mg total) by mouth once daily., Disp: , Rfl:     amLODIPine (NORVASC) 10 MG tablet, Take 1 tablet (10 mg total) by mouth once daily., Disp: 90 tablet, Rfl: 3    aspirin (ECOTRIN) 81 MG EC tablet, Take 1 tablet (81 mg total) by mouth once daily., Disp: 90 tablet, Rfl: 3    atorvastatin (LIPITOR) 80 MG tablet, Take 1 tablet (80 mg total) by mouth once daily., Disp: 90 tablet, Rfl: 3    azithromycin (Z-UMM) 250 MG tablet, Take 1 tablet (250 mg total) by mouth once daily., Disp: 6 tablet, Rfl: 1    HYDROcodone-acetaminophen (NORCO)  mg per tablet, Take 1 tablet by mouth every 6 (six) hours as needed for Pain., Disp: 120 tablet, Rfl: 0    imiquimod (ALDARA) 5 % cream, Apply topically every evening., Disp: , Rfl:     levothyroxine (SYNTHROID) 75 MCG tablet, Take 1 tablet (75 mcg total) by mouth before breakfast., Disp: 90 tablet, Rfl: 3    lisinopriL 10 MG tablet, Take 1 tablet (10 mg total) by mouth 2 (two) times daily., Disp: 180 tablet, Rfl: 3    methocarbamoL (ROBAXIN) 750 MG Tab, Take two tablets 4 times a day as needed. (Patient not taking: Reported on 6/5/2024), Disp: 240 tablet, Rfl: 1    pramoxine-benzyl alcohol (ITCH-X) 1-10 % Spry, Apply 1 spray topically 4 (four) times daily  as needed., Disp: 59.1 mL, Rfl: 5    tadalafiL (CIALIS) 20 MG Tab, Take 1 tablet (20 mg total) by mouth every other day., Disp: 10 tablet, Rfl: 11    zolpidem (AMBIEN) 10 mg Tab, Take 1 tablet (10 mg total) by mouth nightly as needed., Disp: 90 tablet, Rfl: 1  Meds reviewed but not reconciled. He does not bring his meds in for reconciliation.      Review of Systems   Respiratory:  Negative for shortness of breath.    Cardiovascular:  Positive for chest pain and palpitations. Negative for orthopnea, claudication, leg swelling and PND.   Gastrointestinal:  Negative for heartburn.   Neurological:  Negative for dizziness, loss of consciousness and weakness.           Objective:   /70 (BP Location: Left arm, Patient Position: Sitting)   Pulse 108   Ht 6' (1.829 m)   Wt 103.9 kg (229 lb)   SpO2 97%   BMI 31.06 kg/m²     Physical Exam  Vitals and nursing note reviewed.   Constitutional:       Appearance: Normal appearance. He is obese.   HENT:      Head: Atraumatic.   Neck:      Vascular: No carotid bruit.   Cardiovascular:      Rate and Rhythm: Normal rate and regular rhythm.      Pulses: Normal pulses.      Heart sounds: Normal heart sounds.   Pulmonary:      Effort: Pulmonary effort is normal.      Breath sounds: Normal breath sounds.   Abdominal:      Palpations: Abdomen is soft.   Musculoskeletal:      Cervical back: Neck supple.      Right lower leg: No edema.      Left lower leg: No edema.   Skin:     General: Skin is warm and dry.      Capillary Refill: Capillary refill takes less than 2 seconds.   Neurological:      Mental Status: He is alert.           Assessment:     1. Atrial fibrillation, unspecified type  EKG 12-lead    CBC Auto Differential    Magnesium      2. Palpitations  TSH    Comprehensive Metabolic Panel    CBC Auto Differential    Magnesium    Echo      3. Atypical chest pain  TSH    CBC Auto Differential    Magnesium      4. Chest pain, unspecified type  Echo    NM Myocardial Perfusion  "Spect Multi Pharmacologic    Nuclear Stress Test      5. Abnormal electrocardiogram (ECG) (EKG)  NM Myocardial Perfusion Spect Multi Pharmacologic    Nuclear Stress Test      6. S/P AVR  Echo    Nuclear Stress Test      7. Nonrheumatic aortic valve stenosis        8. Hypertension, unspecified type        9. Hyperlipidemia, unspecified hyperlipidemia type        10. Hx of aortic valve replacement              Plan:   Atypical chest pain  Patient reports 3 episodes of chest discomfort in the last 6 months that he describes a "deep soreness" of the left side of his chest. There is no triggering or relieving factors identified and it lasts for no more than one minute. He has had no chest discomfort in the last 2-3 weeks. He states he had a similar discomfort 2 years ago prior to his LHC which demonstrated patent but atrophied PERLA to the OM with no significant disease of the native OM (approx 50% distal Lcx lesion); patent LIMA to the LAD and patent Left radial artery graft the PDA.     His EKG today demonstrated sinus tachycardia with ST changes in the inferior and anterolateral leads. The patient is not having active chest pain. He does not want LHC. I reviewed the EKG and case with Dr. Corea.     Schedule Lexiscan/echo      Nonrheumatic aortic valve stenosis  AVR 8/5/2010 "tissue valve"  Echo 12/2023  RADHA by VTI 2.11 cm2 with mean gradient 12 mmHg   Repeat echo    Hypertension  122/70 mmHg    Hyperlipidemia  Lab Results   Component Value Date    CHOL 133 06/06/2024    CHOL 178 10/02/2023    CHOL 149 09/30/2022     Lab Results   Component Value Date    HDL 54 06/06/2024    HDL 57 10/02/2023    HDL 54 09/30/2022     Lab Results   Component Value Date    LDLCALC 64 06/06/2024    LDLCALC 95 10/02/2023    LDLCALC 77 09/30/2022     Lab Results   Component Value Date    TRIG 77 06/06/2024    TRIG 130 10/02/2023    TRIG 89 09/30/2022       Lab Results   Component Value Date    CHOLHDL 2.5 06/06/2024    CHOLHDL 3.1 10/02/2023 "    CHOLHDL 2.8 09/30/2022     Lipitor 80 mg po daily  Lipids followed by PCP    Atrial fibrillation  Post op after AVR/CABG in 2010  No known recurrence  Amiodarone to maintain RSR   Continue  amiodarone to 100 mg po daily       Palpitations  Occasional palpitations with exertion described as heart racing.  Patient ran out of his thyroid hormone replacement therapy approx one month ago'    TSH, CMP, serum Mag, and CBC today  Patient will contact his PCP for another rx for synthroid.     Hx of aortic valve replacement  8/5/2010 Dr. Krystal Sorto Epic 25 mm   Serial number [id number]   Repeat echo        RTC: 2 months with EKG

## 2024-12-09 NOTE — ASSESSMENT & PLAN NOTE
Lab Results   Component Value Date    CHOL 133 06/06/2024    CHOL 178 10/02/2023    CHOL 149 09/30/2022     Lab Results   Component Value Date    HDL 54 06/06/2024    HDL 57 10/02/2023    HDL 54 09/30/2022     Lab Results   Component Value Date    LDLCALC 64 06/06/2024    LDLCALC 95 10/02/2023    LDLCALC 77 09/30/2022     Lab Results   Component Value Date    TRIG 77 06/06/2024    TRIG 130 10/02/2023    TRIG 89 09/30/2022       Lab Results   Component Value Date    CHOLHDL 2.5 06/06/2024    CHOLHDL 3.1 10/02/2023    CHOLHDL 2.8 09/30/2022     Lipitor 80 mg po daily  Lipids followed by PCP

## 2024-12-09 NOTE — ASSESSMENT & PLAN NOTE
"Patient reports 3 episodes of chest discomfort in the last 6 months that he describes a "deep soreness" of the left side of his chest. There is no triggering or relieving factors identified and it lasts for no more than one minute. He has had no chest discomfort in the last 2-3 weeks. He states he had a similar discomfort 2 years ago prior to his LHC which demonstrated patent but atrophied PERLA to the OM with no significant disease of the native OM (approx 50% distal Lcx lesion); patent LIMA to the LAD and patent Left radial artery graft the PDA.     His EKG today demonstrated sinus tachycardia with ST changes in the inferior and anterolateral leads. The patient is not having active chest pain. He does not want LHC. I reviewed the EKG and case with Dr. Corea.     Speedy Yu/echo    "

## 2024-12-09 NOTE — ASSESSMENT & PLAN NOTE
"AVR 8/5/2010 "tissue valve"  Echo 12/2023  RADHA by VTI 2.11 cm2 with mean gradient 12 mmHg   Repeat echo  "

## 2024-12-09 NOTE — ASSESSMENT & PLAN NOTE
Occasional palpitations with exertion described as heart racing.  Patient ran out of his thyroid hormone replacement therapy approx one month ago'    TSH, CMP, serum Mag, and CBC today  Patient will contact his PCP for another rx for synthroid.

## 2024-12-23 ENCOUNTER — HOSPITAL ENCOUNTER (OUTPATIENT)
Dept: RADIOLOGY | Facility: HOSPITAL | Age: 69
Discharge: HOME | End: 2024-12-23
Attending: NURSE PRACTITIONER
Payer: MEDICARE

## 2024-12-23 ENCOUNTER — HOSPITAL ENCOUNTER (OUTPATIENT)
Dept: CARDIOLOGY | Facility: HOSPITAL | Age: 69
Discharge: HOME | End: 2024-12-23
Attending: NURSE PRACTITIONER
Payer: MEDICARE

## 2024-12-23 VITALS
HEIGHT: 72 IN | WEIGHT: 229 LBS | DIASTOLIC BLOOD PRESSURE: 106 MMHG | BODY MASS INDEX: 31.02 KG/M2 | SYSTOLIC BLOOD PRESSURE: 176 MMHG | HEART RATE: 91 BPM

## 2024-12-23 DIAGNOSIS — Z95.2 S/P AVR: ICD-10-CM

## 2024-12-23 DIAGNOSIS — R07.9 CHEST PAIN, UNSPECIFIED TYPE: ICD-10-CM

## 2024-12-23 DIAGNOSIS — R94.31 ABNORMAL ELECTROCARDIOGRAM (ECG) (EKG): ICD-10-CM

## 2024-12-23 DIAGNOSIS — R00.2 PALPITATIONS: ICD-10-CM

## 2024-12-23 LAB
AORTIC ROOT ANNULUS: 2.19 CM
AORTIC VALVE CUSP SEPERATION: 1.76 CM
AV INDEX (PROSTH): 0.42
AV MEAN GRADIENT: 16 MMHG
AV PEAK GRADIENT: 29.2 MMHG
AV VALVE AREA BY VELOCITY RATIO: 1.4 CM²
AV VALVE AREA: 1.3 CM²
AV VELOCITY RATIO: 0.44
BSA FOR ECHO PROCEDURE: 2.3 M2
CV ECHO LV RWT: 0.38 CM
CV STRESS BASE HR: 71 BPM
DIASTOLIC BLOOD PRESSURE: 106 MMHG
DOP CALC AO PEAK VEL: 2.7 M/S
DOP CALC AO VTI: 65.7 CM
DOP CALC LVOT AREA: 3.1 CM2
DOP CALC LVOT DIAMETER: 2 CM
DOP CALC LVOT PEAK VEL: 1.2 M/S
DOP CALC LVOT STROKE VOLUME: 87.6 CM3
DOP CALCLVOT PEAK VEL VTI: 27.9 CM
E WAVE DECELERATION TIME: 267.05 MSEC
E/A RATIO: 1.34
E/E' RATIO: 12.7 M/S
ECHO LV POSTERIOR WALL: 0.9 CM (ref 0.6–1.1)
FRACTIONAL SHORTENING: 52.1 % (ref 28–44)
INTERVENTRICULAR SEPTUM: 1 CM (ref 0.6–1.1)
IVC DIAMETER: 1.47 CM
LEFT ATRIUM AREA SYSTOLIC (APICAL 2 CHAMBER): 19.25 CM2
LEFT ATRIUM AREA SYSTOLIC (APICAL 4 CHAMBER): 19.18 CM2
LEFT ATRIUM VOLUME INDEX MOD: 26 ML/M2
LEFT ATRIUM VOLUME MOD: 58.83 ML
LEFT INTERNAL DIMENSION IN SYSTOLE: 2.3 CM (ref 2.1–4)
LEFT VENTRICLE DIASTOLIC VOLUME INDEX: 47.04 ML/M2
LEFT VENTRICLE DIASTOLIC VOLUME: 106.3 ML
LEFT VENTRICLE END SYSTOLIC VOLUME APICAL 2 CHAMBER: 60.32 ML
LEFT VENTRICLE END SYSTOLIC VOLUME APICAL 4 CHAMBER: 52.63 ML
LEFT VENTRICLE MASS INDEX: 70.3 G/M2
LEFT VENTRICLE SYSTOLIC VOLUME INDEX: 7.6 ML/M2
LEFT VENTRICLE SYSTOLIC VOLUME: 17.1 ML
LEFT VENTRICULAR INTERNAL DIMENSION IN DIASTOLE: 4.8 CM (ref 3.5–6)
LEFT VENTRICULAR MASS: 158.8 G
LV LATERAL E/E' RATIO: 11.55 M/S
LV SEPTAL E/E' RATIO: 14.11 M/S
LVED V (TEICH): 106.3 ML
LVES V (TEICH): 17.1 ML
LVOT MG: 3.06 MMHG
LVOT MV: 0.81 CM/S
MV PEAK A VEL: 0.95 M/S
MV PEAK E VEL: 1.27 M/S
MV STENOSIS PRESSURE HALF TIME: 77.45 MS
MV VALVE AREA P 1/2 METHOD: 2.84 CM2
OHS CV CPX 1 MINUTE RECOVERY HEART RATE: 88 BPM
OHS CV CPX 85 PERCENT MAX PREDICTED HEART RATE MALE: 129
OHS CV CPX MAX PREDICTED HEART RATE: 152
OHS CV CPX PATIENT IS FEMALE: 0
OHS CV CPX PATIENT IS MALE: 1
OHS CV CPX PEAK DIASTOLIC BLOOD PRESSURE: 106 MMHG
OHS CV CPX PEAK HEAR RATE: 98 BPM
OHS CV CPX PEAK RATE PRESSURE PRODUCT: NORMAL
OHS CV CPX PEAK SYSTOLIC BLOOD PRESSURE: 176 MMHG
OHS CV CPX PERCENT MAX PREDICTED HEART RATE ACHIEVED: 64
OHS CV CPX RATE PRESSURE PRODUCT PRESENTING: NORMAL
OHS CV RV/LV RATIO: 0.81 CM
PISA MRMAX VEL: 4.3 M/S
PISA TR MAX VEL: 2.51 M/S
PV PEAK GRADIENT: 4 MMHG
PV PEAK VELOCITY: 1.03 M/S
RA VOL SYS: 33.05 ML
RIGHT ATRIAL AREA: 15.3 CM2
RIGHT ATRIUM VOLUME AREA LENGTH APICAL 4 CHAMBER: 33.16 ML
RIGHT VENTRICLE DIASTOLIC BASEL DIMENSION: 3.9 CM
RIGHT VENTRICLE DIASTOLIC LENGTH: 6.7 CM
RIGHT VENTRICLE DIASTOLIC MID DIMENSION: 1.7 CM
RIGHT VENTRICULAR LENGTH IN DIASTOLE (APICAL 4-CHAMBER VIEW): 6.66 CM
RV MID DIAMA: 1.74 CM
SYSTOLIC BLOOD PRESSURE: 176 MMHG
TDI LATERAL: 0.11 M/S
TDI SEPTAL: 0.09 M/S
TDI: 0.1 M/S
TR MAX PG: 25 MMHG
TRICUSPID ANNULAR PLANE SYSTOLIC EXCURSION: 2.12 CM
Z-SCORE OF LEFT VENTRICULAR DIMENSION IN END DIASTOLE: -5.47
Z-SCORE OF LEFT VENTRICULAR DIMENSION IN END SYSTOLE: -6.17

## 2024-12-23 PROCEDURE — A9500 TC99M SESTAMIBI: HCPCS | Performed by: NURSE PRACTITIONER

## 2024-12-23 PROCEDURE — 93018 CV STRESS TEST I&R ONLY: CPT | Mod: ,,, | Performed by: INTERNAL MEDICINE

## 2024-12-23 PROCEDURE — 93306 TTE W/DOPPLER COMPLETE: CPT

## 2024-12-23 PROCEDURE — 78452 HT MUSCLE IMAGE SPECT MULT: CPT | Mod: TC

## 2024-12-23 PROCEDURE — 93017 CV STRESS TEST TRACING ONLY: CPT

## 2024-12-23 PROCEDURE — 63600175 PHARM REV CODE 636 W HCPCS: Performed by: NURSE PRACTITIONER

## 2024-12-23 PROCEDURE — 93306 TTE W/DOPPLER COMPLETE: CPT | Mod: 26,,, | Performed by: INTERNAL MEDICINE

## 2024-12-23 PROCEDURE — 78452 HT MUSCLE IMAGE SPECT MULT: CPT | Mod: 26,,, | Performed by: HOSPITALIST

## 2024-12-23 PROCEDURE — 93016 CV STRESS TEST SUPVJ ONLY: CPT | Mod: ,,, | Performed by: INTERNAL MEDICINE

## 2024-12-23 RX ORDER — REGADENOSON 0.08 MG/ML
0.4 INJECTION, SOLUTION INTRAVENOUS ONCE
Status: COMPLETED | OUTPATIENT
Start: 2024-12-23 | End: 2024-12-23

## 2024-12-23 RX ORDER — TETRAKIS(2-METHOXYISOBUTYLISOCYANIDE)COPPER(I) TETRAFLUOROBORATE 1 MG/ML
11.4 INJECTION, POWDER, LYOPHILIZED, FOR SOLUTION INTRAVENOUS
Status: COMPLETED | OUTPATIENT
Start: 2024-12-23 | End: 2024-12-23

## 2024-12-23 RX ORDER — TETRAKIS(2-METHOXYISOBUTYLISOCYANIDE)COPPER(I) TETRAFLUOROBORATE 1 MG/ML
33.5 INJECTION, POWDER, LYOPHILIZED, FOR SOLUTION INTRAVENOUS
Status: COMPLETED | OUTPATIENT
Start: 2024-12-23 | End: 2024-12-23

## 2024-12-23 RX ADMIN — KIT FOR THE PREPARATION OF TECHNETIUM TC99M SESTAMIBI 33.5 MILLICURIE: 1 INJECTION, POWDER, LYOPHILIZED, FOR SOLUTION PARENTERAL at 09:12

## 2024-12-23 RX ADMIN — REGADENOSON 0.4 MG: 0.08 INJECTION, SOLUTION INTRAVENOUS at 09:12

## 2024-12-23 RX ADMIN — KIT FOR THE PREPARATION OF TECHNETIUM TC99M SESTAMIBI 11.4 MILLICURIE: 1 INJECTION, POWDER, LYOPHILIZED, FOR SOLUTION PARENTERAL at 08:12

## 2024-12-31 ENCOUNTER — TELEPHONE (OUTPATIENT)
Dept: CARDIOLOGY | Facility: CLINIC | Age: 69
End: 2024-12-31
Payer: MEDICARE

## 2024-12-31 NOTE — TELEPHONE ENCOUNTER
Normal LV systolic and diastolic function; no significant valve issues Bioprosthetic aortic valve functioning appropriately

## 2024-12-31 NOTE — TELEPHONE ENCOUNTER
----- Message from EDWINA Allen sent at 12/30/2024  4:15 PM CST -----  No ischemia and normal LVEF

## 2025-02-05 ENCOUNTER — HOSPITAL ENCOUNTER (OUTPATIENT)
Dept: CARDIOLOGY | Facility: HOSPITAL | Age: 70
Discharge: HOME | End: 2025-02-05
Attending: NURSE PRACTITIONER
Payer: MEDICARE

## 2025-02-05 ENCOUNTER — OFFICE VISIT (OUTPATIENT)
Dept: CARDIOLOGY | Facility: CLINIC | Age: 70
End: 2025-02-05
Payer: MEDICARE

## 2025-02-05 VITALS
DIASTOLIC BLOOD PRESSURE: 78 MMHG | WEIGHT: 230.19 LBS | HEIGHT: 72 IN | SYSTOLIC BLOOD PRESSURE: 126 MMHG | BODY MASS INDEX: 31.18 KG/M2 | HEART RATE: 81 BPM | OXYGEN SATURATION: 97 %

## 2025-02-05 DIAGNOSIS — I25.118 ATHEROSCLEROTIC HEART DISEASE OF NATIVE CORONARY ARTERY WITH OTHER FORMS OF ANGINA PECTORIS: ICD-10-CM

## 2025-02-05 DIAGNOSIS — Z95.2 HX OF AORTIC VALVE REPLACEMENT: ICD-10-CM

## 2025-02-05 DIAGNOSIS — E78.5 HYPERLIPIDEMIA, UNSPECIFIED HYPERLIPIDEMIA TYPE: ICD-10-CM

## 2025-02-05 DIAGNOSIS — I10 HYPERTENSION, UNSPECIFIED TYPE: ICD-10-CM

## 2025-02-05 DIAGNOSIS — R00.2 PALPITATIONS: Primary | ICD-10-CM

## 2025-02-05 DIAGNOSIS — I48.91 ATRIAL FIBRILLATION, UNSPECIFIED TYPE: ICD-10-CM

## 2025-02-05 DIAGNOSIS — R00.2 PALPITATIONS: ICD-10-CM

## 2025-02-05 DIAGNOSIS — I35.0 NONRHEUMATIC AORTIC VALVE STENOSIS: ICD-10-CM

## 2025-02-05 PROCEDURE — 99999 PR PBB SHADOW E&M-EST. PATIENT-LVL V: CPT | Mod: PBBFAC,,, | Performed by: NURSE PRACTITIONER

## 2025-02-05 PROCEDURE — 99214 OFFICE O/P EST MOD 30 MIN: CPT | Mod: S$PBB,,, | Performed by: NURSE PRACTITIONER

## 2025-02-05 PROCEDURE — 99215 OFFICE O/P EST HI 40 MIN: CPT | Mod: PBBFAC,25 | Performed by: NURSE PRACTITIONER

## 2025-02-05 PROCEDURE — 93248 EXT ECG>7D<15D REV&INTERPJ: CPT | Mod: ,,, | Performed by: NURSE PRACTITIONER

## 2025-02-05 PROCEDURE — 93005 ELECTROCARDIOGRAM TRACING: CPT | Mod: PBBFAC | Performed by: INTERNAL MEDICINE

## 2025-02-05 PROCEDURE — 93246 EXT ECG>7D<15D RECORDING: CPT

## 2025-02-05 PROCEDURE — 93010 ELECTROCARDIOGRAM REPORT: CPT | Mod: S$PBB,,, | Performed by: INTERNAL MEDICINE

## 2025-02-06 PROBLEM — R07.89 ATYPICAL CHEST PAIN: Status: RESOLVED | Noted: 2024-12-09 | Resolved: 2025-02-06

## 2025-02-06 LAB
OHS QRS DURATION: 80 MS
OHS QTC CALCULATION: 430 MS

## 2025-02-06 NOTE — ASSESSMENT & PLAN NOTE
"Patient had "flutters" after last appointment. He is concerned may have been a fib. He has had no known episodes of a fib since his post op a fib in 2010. He admits that he was anxious at the time and his bp was high due to anxiety. He went to see his DIL, who is a nurse who calmed him and took his HR and BP which was improved. He has had no other episodes.    Ericks[employer] monitor  "

## 2025-02-06 NOTE — ASSESSMENT & PLAN NOTE
No chest pain or SOB  Continue ASA/Lipitor      Lexiscan 12/23/2024   Impression:     1. Scintigraphically negative for ischemia, findings favor old infarct or artifact but do not definitively rule out balanced ischemia.  Correlate clinically.  Consider invasive ischemic evaluation if e.g. symptoms discordant with nuclear stress findings.  2. the global left ventricular systolic function is normal with an LV ejection fraction of 70 % and no evidence of LV dilatation. Wall motion (hypokinetic septal segments) appears to correlate with the aforementioned segmental perfusion defects, however could also be explained by LBBB, RV pace, any cause of electromechanical dyssychrony        Electronically signed by: Tj Stevenson  Date:                                            12/23/2024  Time:                                           14:35

## 2025-02-06 NOTE — ASSESSMENT & PLAN NOTE
Echo 12/23/2024 bioprosthetic valve in the aortic position functioning normally with mean gradient of 16 mmHg and no significant AI

## 2025-02-06 NOTE — PROGRESS NOTES
"PCP: Miguel Angel Gregory III, DO    Referring Provider:   Chief Complaint   Patient presents with    Atrial Fibrillation    Palpitations     Flutters after last appt.    Edema     Ankles when driving       Subjective:   Shreyas Man is a 69 y.o. male with hx of AS s/p AVR with a bioprosthetic valve, h/o post op a fib in 2010 without recurrence on amiodarone to maintain RSR, CAD s/p CABG, HTN, and HLD,  who present for follow up to discuss the results of his Lexiscan/echo and reassess symptoms.  Patient had "flutters" after last appointment. He is concerned may have been a fib. He has had no known episodes of a fib since his post op a fib in 2010. He admits that he was anxious at the time and his bp was high due to anxiety. He went to see his DIL, who is a nurse who calmed him and took his HR and BP which was improved. He has had no other episodes.    12/9/2024 presents for routine follow up. Patient reports 3 episodes of chest discomfort in the last 6 months that he describes a "deep soreness" of the left side of his chest. There is no triggering or relieving factors identified and it lasts for no more than one minute. He has had no chest discomfort in the last 2-3 weeks. He states he had a similar discomfort 2 years ago prior to his LHC which demonstrated patent but atrophied PERLA to the OM with no significant disease of the native OM (approx 50% distal Lcx lesion); patent LIMA to the LAD and patent Left radial artery graft the PDA. He also reports occasional palpitations with exertion described as heart racing.  Patient ran out of his thyroid hormone replacement therapy approx one month ago'      His EKG today demonstrated sinus tachycardia with ST changes in the inferior and anterolateral leads. The patient is not having active chest pain. He does not want [facility]. I reviewed the EKG and case with Dr. Corea.     6/5/2024 presents for routine follow up. He is doing well and denies complaints.     12/5/2023  presents " for routine follow up. He remains physically active and asymptomatic.     2023 presents for routine follow up. He remains physically active with out issues.   3/1/2023 presents for routine follow up. He states he is doing will but does have indigestion at times. He has no exertional symptoms.         Fhx:  Family History   Problem Relation Name Age of Onset    Heart attack Mother      Heart attack Father      Heart disease Father       Shx:   Social History     Socioeconomic History    Marital status:    Tobacco Use    Smoking status: Former     Current packs/day: 0.00     Types: Cigarettes     Quit date: 2010     Years since quittin.6    Smokeless tobacco: Never    Tobacco comments:     quit 10+ years ago   Substance and Sexual Activity    Alcohol use: Never    Drug use: Never     Social Drivers of Health     Financial Resource Strain: Low Risk  (2025)    Overall Financial Resource Strain (CARDIA)     Difficulty of Paying Living Expenses: Not hard at all   Food Insecurity: No Food Insecurity (2025)    Hunger Vital Sign     Worried About Running Out of Food in the Last Year: Never true     Ran Out of Food in the Last Year: Never true   Physical Activity: Unknown (2025)    Exercise Vital Sign     Days of Exercise per Week: 3 days   Stress: No Stress Concern Present (2025)    [employer] - Occupational Stress Questionnaire     Feeling of Stress : Only a little   Housing Stability: Unknown (2025)    Housing Stability Vital Sign     Unable to Pay for Housing in the Last Year: No       EKG   2025 RSR with HR 83 bpm; NS ST&TWA; compared with EKG 2024 inferior and anterolateral ST changes have resolved.  2024 sinus tachycardia;  bpm; LVH with ST changes in the inferior and anterolateral leads (reviewed with Dr. Corea)  2024 RSR with sinus arrhythmia; HR 76 bpm; NS ST&TWA; when compared with EKG 2023 no significant change  was found.  Results for orders placed or performed in visit on 02/05/25   EKG 12-lead    Collection Time: 02/05/25 10:35 AM   Result Value Ref Range    QRS Duration 80 ms    [facility] QTC Calculation 430 ms    Narrative    Test Reason : I48.91,    Vent. Rate :  83 BPM     Atrial Rate :  83 BPM     P-R Int : 174 ms          QRS Dur :  80 ms      QT Int : 366 ms       P-R-T Axes :  72  71  75 degrees    QTcB Int : 430 ms    Normal sinus rhythm  Nonspecific ST and T wave abnormality  Abnormal ECG  When compared with ECG of 09-Dec-2024 13:22,  T wave inversion no longer evident in Inferior leads  T wave inversion no longer evident in Lateral leads  Confirmed by Tristan Pham ([id number]) on 2/6/2025 8:50:29 AM    Referred By:            Confirmed By: Tristan Yu 12/23/2024   Impression:     1. Scintigraphically negative for ischemia, findings favor old infarct or artifact but do not definitively rule out balanced ischemia.  Correlate clinically.  Consider invasive ischemic evaluation if e.g. symptoms discordant with nuclear stress findings.  2. the global left ventricular systolic function is normal with an LV ejection fraction of 70 % and no evidence of LV dilatation. Wall motion (hypokinetic septal segments) appears to correlate with the aforementioned segmental perfusion defects, however could also be explained by LBBB, RV pace, any cause of electromechanical dyssychrony        Electronically signed by: Tj Stevenson  Date:                                            12/23/2024  Time:                                           14:35      Results for orders placed during the hospital encounter of 12/23/24    Echo    Interpretation Summary    Left Ventricle: The left ventricle is normal in size. Normal wall thickness. There is normal systolic function with a visually estimated ejection fraction of 60 - 65%. There is normal diastolic function.    Right Ventricle: Normal right ventricular cavity size.  Systolic function is normal.    Aortic Valve: There is a bioprosthetic valve in the aortic position.    Mitral Valve: There is mild regurgitation.    Tricuspid Valve: There is mild regurgitation.    Pulmonic Valve: There is mild regurgitation.         Results for orders placed during the hospital encounter of 12/26/23    Echo    Interpretation Summary    Left Ventricle: The left ventricle is normal in size. Increased wall thickness. Septal thickening. Normal wall motion. There is hyperdynamic systolic function with a visually estimated ejection fraction of 70 - 75%. There is normal diastolic function.    Right Ventricle: Normal right ventricular cavity size. Systolic function is borderline low.    Aortic Valve: There is a porcine prosthetic valve in the aortic position.    Mitral Valve: There is no stenosis.    Tricuspid Valve: There is moderate regurgitation.    Pulmonic Valve: There is mild regurgitation.    IVC/SVC: Normal venous pressure at 3 mmHg.    ECHO Results for orders placed during the hospital encounter of 02/20/23    Echo Saline Bubble? No    Interpretation Summary  · The left ventricle is normal in size with mild concentric hypertrophy and normal systolic function.  · The estimated ejection fraction is 65%.  · Left ventricular diastolic dysfunction.  · Normal right ventricular size.  · There is a bioprosthetic aortic valve present.  · The aortic valve mean gradient is 21 mmHg with a dimensionless index of 0.39.  · Mild mitral regurgitation.  · Mild tricuspid regurgitation.  · Mild pulmonic regurgitation.  · Mild left atrial enlargement.    [facility] Results for orders placed during the hospital encounter of 08/01/22    Cardiac catheterization    Conclusion  · The left ventricular systolic function was normal.  · The left ventricular end diastolic pressure was normal.  · The pre-procedure left ventricular end diastolic pressure was 20.  · The ejection fraction was calculated to be 60%.  · The left ventricular  ejection fraction was grossly normal.  · There was no mitral valve regurgitation.  · The Dist Cx lesion was 50% stenosed.  · The Prox LAD lesion was 80% stenosed.  · The estimated blood loss was none.  · There was three vessel coronary artery disease.  · Zenia to OM is atrophiied with no sig disease in the native om.    The procedure log was documented by Documenter: RT Lena and verified by Tristan Pham MD.    Date: 8/1/2022  Time: 12:24 PM        Lab Results   Component Value Date     12/09/2024    K 4.2 12/09/2024     12/09/2024    CO2 27 12/09/2024    BUN 15 12/09/2024    CREATININE 0.88 12/09/2024    CALCIUM 10.5 (H) 12/09/2024    ANIONGAP 8 12/09/2024    EGFRNONAA 100 07/28/2022       Lab Results   Component Value Date    CHOL 144 12/09/2024    CHOL 133 06/06/2024    CHOL 178 10/02/2023     Lab Results   Component Value Date    HDL 44 12/09/2024    HDL 54 06/06/2024    HDL 57 10/02/2023     Lab Results   Component Value Date    LDLCALC 76 12/09/2024    LDLCALC 64 06/06/2024    LDLCALC 95 10/02/2023     Lab Results   Component Value Date    TRIG 122 12/09/2024    TRIG 77 06/06/2024    TRIG 130 10/02/2023     Lab Results   Component Value Date    CHOLHDL 3.3 12/09/2024    CHOLHDL 2.5 06/06/2024    CHOLHDL 3.1 10/02/2023       Lab Results   Component Value Date    WBC 8.80 12/09/2024    HGB 17.0 12/09/2024    HCT 51.8 12/09/2024    MCV 94.9 12/09/2024     12/09/2024           Current Outpatient Medications:     ALPRAZolam (XANAX) 0.25 MG tablet, Take 1 tablet (0.25 mg total) by mouth daily as needed for Anxiety., Disp: 90 tablet, Rfl: 3    amiodarone (PACERONE) 200 MG Tab, Take 0.5 tablets (100 mg total) by mouth once daily., Disp: , Rfl:     amLODIPine (NORVASC) 10 MG tablet, Take 1 tablet (10 mg total) by mouth once daily., Disp: 90 tablet, Rfl: 3    aspirin (ECOTRIN) 81 MG EC tablet, Take 1 tablet (81 mg total) by mouth once daily., Disp: 90 tablet, Rfl: 3    atorvastatin  (LIPITOR) 80 MG tablet, Take 1 tablet (80 mg total) by mouth once daily., Disp: 90 tablet, Rfl: 3    azithromycin (Z-UMM) 250 MG tablet, Take 1 tablet (250 mg total) by mouth once daily., Disp: 6 tablet, Rfl: 1    HYDROcodone-acetaminophen (NORCO)  mg per tablet, Take 1 tablet by mouth every 6 (six) hours as needed for Pain., Disp: 120 tablet, Rfl: 0    imiquimod (ALDARA) 5 % cream, Apply topically every evening., Disp: , Rfl:     levothyroxine (SYNTHROID) 75 MCG tablet, Take 1 tablet (75 mcg total) by mouth before breakfast., Disp: 90 tablet, Rfl: 3    lisinopriL 10 MG tablet, Take 1 tablet (10 mg total) by mouth 2 (two) times daily., Disp: 180 tablet, Rfl: 3    methocarbamoL (ROBAXIN) 750 MG Tab, Take two tablets 4 times a day as needed. (Patient not taking: Reported on 6/5/2024), Disp: 240 tablet, Rfl: 1    pramoxine-benzyl alcohol (ITCH-X) 1-10 % Spry, Apply 1 spray topically 4 (four) times daily as needed., Disp: 59.1 mL, Rfl: 5    tadalafiL (CIALIS) 20 MG Tab, Take 1 tablet (20 mg total) by mouth every other day., Disp: 10 tablet, Rfl: 11    zolpidem (AMBIEN) 10 mg Tab, Take 1 tablet (10 mg total) by mouth nightly as needed., Disp: 90 tablet, Rfl: 1  Meds reviewed but not reconciled. He does not bring his meds in for reconciliation.      Review of Systems   Respiratory:  Negative for shortness of breath.    Cardiovascular:  Positive for palpitations and leg swelling. Negative for chest pain, orthopnea, claudication and PND.        BLE edema when driving   Gastrointestinal:  Negative for heartburn.   Neurological:  Negative for dizziness, loss of consciousness and weakness.           Objective:   /78 (BP Location: Left arm, Patient Position: Sitting)   Pulse 81   Ht 6' (1.829 m)   Wt 104.4 kg (230 lb 3.2 oz)   SpO2 97%   BMI 31.22 kg/m²     Physical Exam  Vitals and nursing note reviewed.   Constitutional:       Appearance: Normal appearance. He is obese.   HENT:      Head: Atraumatic.   Neck:       Vascular: No carotid bruit.   Cardiovascular:      Rate and Rhythm: Normal rate and regular rhythm.      Pulses: Normal pulses.      Heart sounds: Normal heart sounds.   Pulmonary:      Effort: Pulmonary effort is normal.      Breath sounds: Normal breath sounds.   Abdominal:      Palpations: Abdomen is soft.   Musculoskeletal:      Cervical back: Neck supple.      Right lower leg: No edema.      Left lower leg: No edema.   Skin:     General: Skin is warm and dry.      Capillary Refill: Capillary refill takes less than 2 seconds.   Neurological:      Mental Status: He is alert.           Assessment:     1. Palpitations  Cardiac Monitor - 3-15 Day Adult (Cupid Only)      2. Atrial fibrillation, unspecified type  EKG 12-lead    EKG 12-lead      3. Atherosclerotic heart disease of native coronary artery with other forms of angina pectoris        4. Hyperlipidemia, unspecified hyperlipidemia type        5. Hypertension, unspecified type        6. Nonrheumatic aortic valve stenosis        7. Hx of aortic valve replacement              Plan:   Atherosclerotic heart disease of native coronary artery with other forms of angina pectoris  No chest pain or SOB  Continue ASA/Lipitor      Lexiscan 12/23/2024   Impression:     1. Scintigraphically negative for ischemia, findings favor old infarct or artifact but do not definitively rule out balanced ischemia.  Correlate clinically.  Consider invasive ischemic evaluation if e.g. symptoms discordant with nuclear stress findings.  2. the global left ventricular systolic function is normal with an LV ejection fraction of 70 % and no evidence of LV dilatation. Wall motion (hypokinetic septal segments) appears to correlate with the aforementioned segmental perfusion defects, however could also be explained by LBBB, RV pace, any cause of electromechanical dyssychrony        Electronically signed by: Tj Stevenson  Date:                                            12/23/2024  Time:    "                                        14:35    Atrial fibrillation  Patient had "flutters after last appointment. He is concerned may have been a fib. He has had no known episodes of a fib since his post op a fib in 2010. He admits that he was anxious at the time and his bp was high due to anxiety. He went to see his DIL, who is a nurse who calmed him and took his HR and BP which was improved. He has had no other episodes.    Zio monitor    Hyperlipidemia  Lab Results   Component Value Date    CHOL 144 12/09/2024    CHOL 133 06/06/2024    CHOL 178 10/02/2023     Lab Results   Component Value Date    HDL 44 12/09/2024    HDL 54 06/06/2024    HDL 57 10/02/2023     Lab Results   Component Value Date    LDLCALC 76 12/09/2024    LDLCALC 64 06/06/2024    LDLCALC 95 10/02/2023     Lab Results   Component Value Date    TRIG 122 12/09/2024    TRIG 77 06/06/2024    TRIG 130 10/02/2023       Lab Results   Component Value Date    CHOLHDL 3.3 12/09/2024    CHOLHDL 2.5 06/06/2024    CHOLHDL 3.1 10/02/2023     Lipitor 80 mg po daily  Lipids followed by PCP    Hypertension  126/78 mmHg    Nonrheumatic aortic valve stenosis  Echo 12/23/2024 bioprosthetic valve in the aortic position functioning normally with mean gradient of 16 mmHg and no significant AI    Hx of aortic valve replacement  8/5/2010 Dr. Krystal Sorto Epic 25 mm   Serial number [id number]           RTC: 6 months          "

## 2025-02-06 NOTE — ASSESSMENT & PLAN NOTE
Lab Results   Component Value Date    CHOL 144 12/09/2024    CHOL 133 06/06/2024    CHOL 178 10/02/2023     Lab Results   Component Value Date    HDL 44 12/09/2024    HDL 54 06/06/2024    HDL 57 10/02/2023     Lab Results   Component Value Date    LDLCALC 76 12/09/2024    LDLCALC 64 06/06/2024    LDLCALC 95 10/02/2023     Lab Results   Component Value Date    TRIG 122 12/09/2024    TRIG 77 06/06/2024    TRIG 130 10/02/2023       Lab Results   Component Value Date    CHOLHDL 3.3 12/09/2024    CHOLHDL 2.5 06/06/2024    CHOLHDL 3.1 10/02/2023     Lipitor 80 mg po daily  Lipids followed by PCP

## 2025-02-27 PROCEDURE — 88305 TISSUE EXAM BY PATHOLOGIST: CPT | Mod: TC,SUR | Performed by: DERMATOLOGY

## 2025-02-27 PROCEDURE — 88305 TISSUE EXAM BY PATHOLOGIST: CPT | Mod: 26,,, | Performed by: PATHOLOGY

## 2025-02-28 ENCOUNTER — LAB REQUISITION (OUTPATIENT)
Dept: LAB | Facility: HOSPITAL | Age: 70
End: 2025-02-28
Attending: DERMATOLOGY
Payer: MEDICARE

## 2025-02-28 DIAGNOSIS — D49.2 NEOPLASM OF UNSPECIFIED BEHAVIOR OF BONE, SOFT TISSUE, AND SKIN: ICD-10-CM

## 2025-03-03 LAB
ESTROGEN SERPL-MCNC: NORMAL PG/ML
INSULIN SERPL-ACNC: NORMAL U[IU]/ML
LAB AP GROSS DESCRIPTION: NORMAL
LAB AP LABORATORY NOTES: NORMAL
LAB AP SPEC A DDX: NORMAL
LAB AP SPEC A MORPHOLOGY: NORMAL
LAB AP SPEC A PROCEDURE: NORMAL
LAB AP SPEC B DDX: NORMAL
LAB AP SPEC B MORPHOLOGY: NORMAL
LAB AP SPEC B PROCEDURE: NORMAL
OHS CV EVENT MONITOR DAY: 13
OHS CV HOLTER HOOKUP DATE: NORMAL
OHS CV HOLTER HOOKUP TIME: NORMAL
OHS CV HOLTER LENGTH DECIMAL HOURS: 317
OHS CV HOLTER LENGTH HOURS: 5
OHS CV HOLTER LENGTH MINUTES: 0
OHS CV HOLTER SCAN DATE: NORMAL
OHS CV HOLTER SINUS AVERAGE HR: 65 BPM
OHS CV HOLTER SINUS MAX HR: 203 BPM
OHS CV HOLTER SINUS MIN HR: 39 BPM
OHS CV HOLTER STUDY END DATE: NORMAL
OHS CV HOLTER STUDY END TIME: NORMAL
T3RU NFR SERPL: NORMAL %

## 2025-03-05 ENCOUNTER — RESULTS FOLLOW-UP (OUTPATIENT)
Dept: CARDIOLOGY | Facility: CLINIC | Age: 70
End: 2025-03-05

## 2025-03-05 NOTE — PROGRESS NOTES
Pt notified and agrees to go to EP in [place]. Pt was told he should hear from them within the next week or so.

## 2025-03-31 ENCOUNTER — TELEPHONE (OUTPATIENT)
Dept: CARDIOLOGY | Facility: CLINIC | Age: 70
End: 2025-03-31
Payer: MEDICARE

## 2025-03-31 NOTE — TELEPHONE ENCOUNTER
----- Message from Pretty sent at 3/27/2025  1:01 PM CDT -----  Who Called: Shreyas Ricks is requesting assistance/information from provider's office.Pt is needing to speak to a nurse about his ablation procedure for his heart. Preferred Method of Contact: Phone CallPatient's Preferred Phone Number on File: [phone] Best Call Back Number, if different:Additional Information:

## 2025-04-09 ENCOUNTER — OFFICE VISIT (OUTPATIENT)
Dept: CARDIOLOGY | Facility: CLINIC | Age: 70
End: 2025-04-09
Payer: MEDICARE

## 2025-04-09 VITALS
WEIGHT: 230.38 LBS | BODY MASS INDEX: 31.2 KG/M2 | DIASTOLIC BLOOD PRESSURE: 88 MMHG | HEIGHT: 72 IN | SYSTOLIC BLOOD PRESSURE: 122 MMHG | OXYGEN SATURATION: 97 % | HEART RATE: 87 BPM

## 2025-04-09 DIAGNOSIS — I35.0 NONRHEUMATIC AORTIC VALVE STENOSIS: ICD-10-CM

## 2025-04-09 DIAGNOSIS — I47.10 SVT (SUPRAVENTRICULAR TACHYCARDIA): Primary | ICD-10-CM

## 2025-04-09 DIAGNOSIS — I25.118 ATHEROSCLEROTIC HEART DISEASE OF NATIVE CORONARY ARTERY WITH OTHER FORMS OF ANGINA PECTORIS: Primary | ICD-10-CM

## 2025-04-09 DIAGNOSIS — Z95.2 HX OF AORTIC VALVE REPLACEMENT: ICD-10-CM

## 2025-04-09 DIAGNOSIS — I47.10 PAROXYSMAL SVT (SUPRAVENTRICULAR TACHYCARDIA): ICD-10-CM

## 2025-04-09 DIAGNOSIS — E78.5 HYPERLIPIDEMIA, UNSPECIFIED HYPERLIPIDEMIA TYPE: ICD-10-CM

## 2025-04-09 DIAGNOSIS — I10 HYPERTENSION, UNSPECIFIED TYPE: ICD-10-CM

## 2025-04-09 DIAGNOSIS — I48.91 ATRIAL FIBRILLATION, UNSPECIFIED TYPE: ICD-10-CM

## 2025-04-09 PROCEDURE — 99214 OFFICE O/P EST MOD 30 MIN: CPT | Mod: PBBFAC | Performed by: NURSE PRACTITIONER

## 2025-04-09 PROCEDURE — 99215 OFFICE O/P EST HI 40 MIN: CPT | Mod: S$PBB,,, | Performed by: NURSE PRACTITIONER

## 2025-04-09 PROCEDURE — 99999 PR PBB SHADOW E&M-EST. PATIENT-LVL IV: CPT | Mod: PBBFAC,,, | Performed by: NURSE PRACTITIONER

## 2025-04-09 NOTE — ASSESSMENT & PLAN NOTE
"Patient had "flutters" after last appointment. He is concerned may have been a fib. He has had no known episodes of a fib since his post op a fib in 2010. He admits that he was anxious at the time and his bp was high due to anxiety. He went to see his DIL, who is a nurse who calmed him and took his HR and BP which was improved. He has had no other episodes.     Zio monitor demonstrated 70 runs SVT, longest 13.1 seconds (130 beats per minute.).  I recommended that he see an EP and he was to call me back when he decided to go or not. In the meantime he was seen by his PCP, who referred him to Dr. Judd at [facility]. The patient reports that he was not happy with the interaction with Dr. Judd and does not want to see him again. I have reviewed the notes from that OV. Dr. Judd has recommended a Loop implant and the patient would like Dr. Pham to do that here. The patient would like to see Dr. Brito in [place], [place] if needed pending information from the Loop.     Schedule Loop implant with Dr. Pham     "

## 2025-04-09 NOTE — ASSESSMENT & PLAN NOTE
Zio monitor demonstrated 70 runs SVT, longest 13.1 seconds (130 beats per minute.).  I recommended that he see an EP and he was to call me back when he decided to go or not. In the meantime he was seen by his PCP, who referred him to Dr. Judd at [facility] in [place]. The patient reports that he was not happy with the interaction with Dr. Judd and does not want to see him again. I have reviewed the notes from that OV. Dr. Judd has recommended a Loop implant and the patient would like Dr. Pham to do that here. The patient would like to see Dr. Brito in [place], [place] if needed pending information from the Loop.

## 2025-04-09 NOTE — PROGRESS NOTES
"PCP: Dina Sebastian DO    Referring Provider:   Chief Complaint   Patient presents with    Atrial Fibrillation    Edema     Only when traveling       Subjective:   Shreyas Man is a 70 y.o. male with hx of AS s/p AVR with a bioprosthetic valve, h/o post op a fib in 2010 without recurrence on amiodarone to maintain RSR, CAD s/p CABG, HTN, and HLD,  who presents to discuss referral to EP. The Zio monitor demonstrated 70 runs SVT, longest 13.1 seconds (130 beats per minute.). I recommended that he see an EP and he was to call me back when he decided to go or not. In the meantime he was seen by his PCP, who referred him to Dr. Judd at [facility] in [place]. The patient reports that he was not happy with the interaction with Dr. Judd and does not want to see him again. I have reviewed the notes from that OV. Dr. Judd has recommended a Loop implant and the patient would like Dr. Pham to do that here. The patient would like to see Dr. Brito in [place], [place] if needed pending information from the Loop.     2/5/2025 present for follow up to discuss the results of his Lexiscan/echo and reassess symptoms.  Patient had "flutters" after last appointment. He is concerned may have been a fib. He has had no known episodes of a fib since his post op a fib in 2010. He admits that he was anxious at the time and his bp was high due to anxiety. He went to see his DIL, who is a nurse who calmed him and took his HR and BP which was improved. He has had no other episodes.    12/9/2024 presents for routine follow up. Patient reports 3 episodes of chest discomfort in the last 6 months that he describes a "deep soreness" of the left side of his chest. There is no triggering or relieving factors identified and it lasts for no more than one minute. He has had no chest discomfort in the last 2-3 weeks. He states he had a similar discomfort 2 years ago prior to his LHC which demonstrated patent but atrophied PERLA to the OM with no " significant disease of the native OM (approx 50% distal Lcx lesion); patent LIMA to the LAD and patent Left radial artery graft the PDA. He also reports occasional palpitations with exertion described as heart racing.  Patient ran out of his thyroid hormone replacement therapy approx one month ago'      His EKG today demonstrated sinus tachycardia with ST changes in the inferior and anterolateral leads. The patient is not having active chest pain. He does not want LHC. I reviewed the EKG and case with Dr. Corea.     2024 presents for routine follow up. He is doing well and denies complaints.     2023  presents for routine follow up. He remains physically active and asymptomatic.     2023 presents for routine follow up. He remains physically active with out issues.   3/1/2023 presents for routine follow up. He states he is doing will but does have indigestion at times. He has no exertional symptoms.         Fhx:  Family History   Problem Relation Name Age of Onset    Heart attack Mother      Heart attack Father      Heart disease Father       Shx:   Social History     Socioeconomic History    Marital status:    Tobacco Use    Smoking status: Former     Current packs/day: 0.00     Types: Cigarettes     Quit date: 2010     Years since quittin.8    Smokeless tobacco: Never    Tobacco comments:     quit 10+ years ago   Substance and Sexual Activity    Alcohol use: Never    Drug use: Never     Social Drivers of Health     Financial Resource Strain: Low Risk  (2025)    Overall Financial Resource Strain (CARDIA)     Difficulty of Paying Living Expenses: Not hard at all   Food Insecurity: No Food Insecurity (2025)    Hunger Vital Sign     Worried About Running Out of Food in the Last Year: Never true     Ran Out of Food in the Last Year: Never true   Transportation Needs: No Transportation Needs (2025)    PRAPARE - Transportation     Lack of Transportation (Medical): No     Lack of  Transportation (Non-Medical): No   Physical Activity: Sufficiently Active (3/25/2025)    Received from [facility]    Exercise Vital Sign     Days of Exercise per Week: 5 days     Minutes of Exercise per Session: 30 min   Stress: No Stress Concern Present (3/25/2025)    Received from [facility]    [employer] [place] [employer] - Occupational Stress Questionnaire     Feeling of Stress : Only a little   Housing Stability: Low Risk  (4/2/2025)    Housing Stability Vital Sign     Unable to Pay for Housing in the Last Year: No     Homeless in the Last Year: No       EKG   2/5/2025 RSR with HR 83 bpm; NS ST&TWA; compared with EKG 12/9/2024 inferior and anterolateral ST changes have resolved.  12/9/2024 sinus tachycardia;  bpm; LVH with ST changes in the inferior and anterolateral leads (reviewed with Dr. Corea)  6/5/2024 RSR with sinus arrhythmia; HR 76 bpm; NS ST&TWA; when compared with EKG 12/5/2023 no significant change was found.  Results for orders placed or performed in visit on 02/05/25   EKG 12-lead    Collection Time: 02/05/25 10:35 AM   Result Value Ref Range    QRS Duration 80 ms    OHS QTC Calculation 430 ms    Narrative    Test Reason : I48.91,    Vent. Rate :  83 BPM     Atrial Rate :  83 BPM     P-R Int : 174 ms          QRS Dur :  80 ms      QT Int : 366 ms       P-R-T Axes :  72  71  75 degrees    QTcB Int : 430 ms    Normal sinus rhythm  Nonspecific ST and T wave abnormality  Abnormal ECG  When compared with ECG of 09-Dec-2024 13:22,  T wave inversion no longer evident in Inferior leads  T wave inversion no longer evident in Lateral leads  Confirmed by Tristan Pham ([id number]) on 2/6/2025 8:50:29 AM    Referred By:            Confirmed By: Tristan Pham     Zio monitor worn from 2/5/2025-2/18/2025  Interpretation Summary  Show Result Comparison       Basic rhythm is sinus, average heart rate 65 beats per minute.    Occasional  PACs, couplets and triplets.  70 runs SVT, longest 13.1 seconds (130 beats per minute.).    Rare PVCs and couplets.  One 6 beat run VT, max heart rate 203 beats per minute.    Two patient triggered events during NSR (65-96), PACs and PVCs.     Patient had a min HR of 39 bpm, max HR of 203 bpm, and avg HR of 65 bpm. Predominant underlying rhythm was Sinus Rhythm. 1 run of Ventricular Tachycardia occurred lasting 6 beats with a max rate of 203 bpm (avg 196 bpm). 70 Supraventricular Tachycardia runs occurred, the run with the fastest interval lasting 11.4 secs with a max rate of 174 bpm, the longest lasting 13.1 secs with an avg rate of 130 bpm. Isolated SVEs were occasional (1.2%, 11628), SVE Couplets were rare (<1.0%, 1561), and SVE Triplets were rare (<1.0%, 165). Isolated VEs were rare (<1.0%, 349), VE Couplets were rare (<1.0%, 15), and no VE Triplets were present.         [email or web address] 12/23/2024   Impression:     1. Scintigraphically negative for ischemia, findings favor old infarct or artifact but do not definitively rule out balanced ischemia.  Correlate clinically.  Consider invasive ischemic evaluation if e.g. symptoms discordant with nuclear stress findings.  2. the global left ventricular systolic function is normal with an LV ejection fraction of 70 % and no evidence of LV dilatation. Wall motion (hypokinetic septal segments) appears to correlate with the aforementioned segmental perfusion defects, however could also be explained by LBBB, RV pace, any cause of electromechanical dyssychrony        Electronically signed by: Tj Stevenson  Date:                                            12/23/2024  Time:                                           14:35      Results for orders placed during the hospital encounter of 12/23/24    Echo    Interpretation Summary    Left Ventricle: The left ventricle is normal in size. Normal wall thickness. There is normal systolic function with a visually estimated ejection fraction  of 60 - 65%. There is normal diastolic function.    Right Ventricle: Normal right ventricular cavity size. Systolic function is normal.    Aortic Valve: There is a bioprosthetic valve in the aortic position.    Mitral Valve: There is mild regurgitation.    Tricuspid Valve: There is mild regurgitation.    Pulmonic Valve: There is mild regurgitation.         Results for orders placed during the hospital encounter of 12/26/23    Echo    Interpretation Summary    Left Ventricle: The left ventricle is normal in size. Increased wall thickness. Septal thickening. Normal wall motion. There is hyperdynamic systolic function with a visually estimated ejection fraction of 70 - 75%. There is normal diastolic function.    Right Ventricle: Normal right ventricular cavity size. Systolic function is borderline low.    Aortic Valve: There is a porcine prosthetic valve in the aortic position.    Mitral Valve: There is no stenosis.    Tricuspid Valve: There is moderate regurgitation.    Pulmonic Valve: There is mild regurgitation.    IVC/SVC: Normal venous pressure at 3 mmHg.    ECHO Results for orders placed during the hospital encounter of 02/20/23    Echo Saline Bubble? No    Interpretation Summary  · The left ventricle is normal in size with mild concentric hypertrophy and normal systolic function.  · The estimated ejection fraction is 65%.  · Left ventricular diastolic dysfunction.  · Normal right ventricular size.  · There is a bioprosthetic aortic valve present.  · The aortic valve mean gradient is 21 mmHg with a dimensionless index of 0.39.  · Mild mitral regurgitation.  · Mild tricuspid regurgitation.  · Mild pulmonic regurgitation.  · Mild left atrial enlargement.    [facility] Results for orders placed during the hospital encounter of 08/01/22    Cardiac catheterization    Conclusion  · The left ventricular systolic function was normal.  · The left ventricular end diastolic pressure was normal.  · The pre-procedure left  ventricular end diastolic pressure was 20.  · The ejection fraction was calculated to be 60%.  · The left ventricular ejection fraction was grossly normal.  · There was no mitral valve regurgitation.  · The Dist Cx lesion was 50% stenosed.  · The Prox LAD lesion was 80% stenosed.  · The estimated blood loss was none.  · There was three vessel coronary artery disease.  · Zenia to OM is atrophiied with no sig disease in the native om.    The procedure log was documented by Documenter: RT Lena and verified by Tristan Pham MD.    Date: 8/1/2022  Time: 12:24 PM        Lab Results   Component Value Date     12/09/2024    K 4.2 12/09/2024     12/09/2024    CO2 27 12/09/2024    BUN 15 12/09/2024    CREATININE 0.88 12/09/2024    CALCIUM 10.5 (H) 12/09/2024    ANIONGAP 8 12/09/2024    EGFRNONAA 100 07/28/2022       Lab Results   Component Value Date    CHOL 144 12/09/2024    CHOL 133 06/06/2024    CHOL 178 10/02/2023     Lab Results   Component Value Date    HDL 44 12/09/2024    HDL 54 06/06/2024    HDL 57 10/02/2023     Lab Results   Component Value Date    LDLCALC 76 12/09/2024    LDLCALC 64 06/06/2024    LDLCALC 95 10/02/2023     Lab Results   Component Value Date    TRIG 122 12/09/2024    TRIG 77 06/06/2024    TRIG 130 10/02/2023     Lab Results   Component Value Date    CHOLHDL 3.3 12/09/2024    CHOLHDL 2.5 06/06/2024    CHOLHDL 3.1 10/02/2023       Lab Results   Component Value Date    WBC 8.80 12/09/2024    HGB 17.0 12/09/2024    HCT 51.8 12/09/2024    MCV 94.9 12/09/2024     12/09/2024           Current Outpatient Medications:     ALPRAZolam (XANAX) 0.25 MG tablet, Take 1 tablet (0.25 mg total) by mouth daily as needed for Anxiety., Disp: 90 tablet, Rfl: 3    amiodarone (PACERONE) 200 MG Tab, Take 0.5 tablets (100 mg total) by mouth once daily., Disp: , Rfl:     amLODIPine (NORVASC) 10 MG tablet, Take 1 tablet (10 mg total) by mouth once daily., Disp: 90 tablet, Rfl: 3    aspirin  (ECOTRIN) 81 MG EC tablet, Take 1 tablet (81 mg total) by mouth once daily., Disp: 90 tablet, Rfl: 3    atorvastatin (LIPITOR) 80 MG tablet, Take 1 tablet (80 mg total) by mouth once daily., Disp: 90 tablet, Rfl: 3    azithromycin (Z-UMM) 250 MG tablet, Take 1 tablet (250 mg total) by mouth once daily., Disp: 6 tablet, Rfl: 1    HYDROcodone-acetaminophen (NORCO)  mg per tablet, Take 1 tablet by mouth every 6 (six) hours as needed for Pain., Disp: 120 tablet, Rfl: 0    imiquimod (ALDARA) 5 % cream, Apply topically every evening., Disp: , Rfl:     levothyroxine (SYNTHROID) 75 MCG tablet, Take 1 tablet (75 mcg total) by mouth before breakfast., Disp: 90 tablet, Rfl: 3    lisinopriL 10 MG tablet, Take 1 tablet (10 mg total) by mouth 2 (two) times daily., Disp: 180 tablet, Rfl: 3    methocarbamoL (ROBAXIN) 750 MG Tab, Take two tablets 4 times a day as needed. (Patient not taking: Reported on 6/5/2024), Disp: 240 tablet, Rfl: 1    pramoxine-benzyl alcohol (ITCH-X) 1-10 % Spry, Apply 1 spray topically 4 (four) times daily as needed., Disp: 59.1 mL, Rfl: 5    tadalafiL (CIALIS) 20 MG Tab, Take 1 tablet (20 mg total) by mouth every other day., Disp: 10 tablet, Rfl: 11    zolpidem (AMBIEN) 10 mg Tab, Take 1 tablet (10 mg total) by mouth nightly as needed., Disp: 90 tablet, Rfl: 1  Meds reviewed but not reconciled. He does not bring his meds in for reconciliation.      Review of Systems   Respiratory:  Negative for shortness of breath.    Cardiovascular:  Positive for palpitations and leg swelling. Negative for chest pain, orthopnea, claudication and PND.        BLE edema when driving   Gastrointestinal:  Negative for heartburn.   Neurological:  Negative for dizziness, loss of consciousness and weakness.           Objective:   /88   Pulse 87   Ht 6' (1.829 m)   Wt 104.5 kg (230 lb 6.4 oz)   SpO2 97%   BMI 31.25 kg/m²     Physical Exam  Vitals and nursing note reviewed.   Constitutional:       Appearance:  "Normal appearance. He is obese.   HENT:      Head: Atraumatic.   Neck:      Vascular: No carotid bruit.   Cardiovascular:      Rate and Rhythm: Normal rate and regular rhythm.      Pulses: Normal pulses.      Heart sounds: Normal heart sounds.   Pulmonary:      Effort: Pulmonary effort is normal.      Breath sounds: Normal breath sounds.   Abdominal:      Palpations: Abdomen is soft.   Musculoskeletal:      Cervical back: Neck supple.      Right lower leg: No edema.      Left lower leg: No edema.   Skin:     General: Skin is warm and dry.      Capillary Refill: Capillary refill takes less than 2 seconds.   Neurological:      Mental Status: He is alert.           Assessment:     1. Atherosclerotic heart disease of native coronary artery with other forms of angina pectoris        2. Atrial fibrillation, unspecified type        3. Paroxysmal SVT (supraventricular tachycardia)        4. Hyperlipidemia, unspecified hyperlipidemia type        5. Hypertension, unspecified type        6. Nonrheumatic aortic valve stenosis        7. Hx of aortic valve replacement              Plan:   Atherosclerotic heart disease of native coronary artery with other forms of angina pectoris  Asymptomatic  Continue ASA/Lipitor    Atrial fibrillation  Patient had "flutters" after last appointment. He is concerned may have been a fib. He has had no known episodes of a fib since his post op a fib in 2010. He admits that he was anxious at the time and his bp was high due to anxiety. He went to see his DIL, who is a nurse who calmed him and took his HR and BP which was improved. He has had no other episodes.     Zio monitor demonstrated 70 runs SVT, longest 13.1 seconds (130 beats per minute.).  I recommended that he see an EP and he was to call me back when he decided to go or not. In the meantime he was seen by his PCP, who referred him to Dr. Judd at [facility]. The patient reports that he was not happy with the interaction with  " Binta and does not want to see him again. I have reviewed the notes from that OV. Dr. Judd has recommended a Loop implant and the patient would like Dr. Pham to do that here. The patient would like to see Dr. Brito in [place] if needed pending information from the Loop.     Schedule Loop implant with Dr. Pham       Paroxysmal SVT (supraventricular tachycardia)  Zio monitor demonstrated 70 runs SVT, longest 13.1 seconds (130 beats per minute.).  I recommended that he see an EP and he was to call me back when he decided to go or not. In the meantime he was seen by his PCP, who referred him to Dr. Judd at [facility] in [place]. The patient reports that he was not happy with the interaction with Dr. Judd and does not want to see him again. I have reviewed the notes from that OV. Dr. Judd has recommended a Loop implant and the patient would like Dr. Pham to do that here. The patient would like to see Dr. Alisha Munguia [place] if needed pending information from the Loop.     Hyperlipidemia  Lab Results   Component Value Date    CHOL 144 12/09/2024    CHOL 133 06/06/2024    CHOL 178 10/02/2023     Lab Results   Component Value Date    HDL 44 12/09/2024    HDL 54 06/06/2024    HDL 57 10/02/2023     Lab Results   Component Value Date    LDLCALC 76 12/09/2024    LDLCALC 64 06/06/2024    LDLCALC 95 10/02/2023     Lab Results   Component Value Date    TRIG 122 12/09/2024    TRIG 77 06/06/2024    TRIG 130 10/02/2023       Lab Results   Component Value Date    CHOLHDL 3.3 12/09/2024    CHOLHDL 2.5 06/06/2024    CHOLHDL 3.1 10/02/2023   Lipitor 80 mg po daily  Lipids followed by PCP      Hypertension  122/88 mmHg    Nonrheumatic aortic valve stenosis  Echo 12/23/2024 bioprosthetic valve in the aortic position functioning normally with mean gradient of 16 mmHg and no significant AI       Hx of aortic valve replacement  8/5/2010 Dr. Rice  [facility] Epic 25 mm   Serial number [id number]              RTC: as  scheduled in 8/2025

## 2025-04-30 ENCOUNTER — HOSPITAL ENCOUNTER (INPATIENT)
Facility: HOSPITAL | Age: 70
LOS: 1 days | Discharge: HOME | DRG: 310 | End: 2025-05-01
Attending: FAMILY MEDICINE | Admitting: HOSPITALIST
Payer: MEDICARE

## 2025-04-30 ENCOUNTER — TELEPHONE (OUTPATIENT)
Dept: CARDIOLOGY | Facility: CLINIC | Age: 70
End: 2025-04-30
Payer: MEDICARE

## 2025-04-30 DIAGNOSIS — E80.6 HYPERBILIRUBINEMIA: ICD-10-CM

## 2025-04-30 DIAGNOSIS — R00.2 PALPITATIONS: ICD-10-CM

## 2025-04-30 DIAGNOSIS — R07.9 CHEST PAIN: ICD-10-CM

## 2025-04-30 DIAGNOSIS — I48.91 ATRIAL FIBRILLATION WITH RVR: Primary | ICD-10-CM

## 2025-04-30 PROBLEM — Z95.1 HX OF CABG: Status: ACTIVE | Noted: 2025-04-30

## 2025-04-30 PROBLEM — I47.10 PAROXYSMAL SVT (SUPRAVENTRICULAR TACHYCARDIA): Status: RESOLVED | Noted: 2025-04-09 | Resolved: 2025-04-30

## 2025-04-30 LAB
ALBUMIN SERPL BCP-MCNC: 4.2 G/DL (ref 3.4–4.8)
ALBUMIN/GLOB SERPL: 1.2 {RATIO}
ALP SERPL-CCNC: 91 U/L (ref 40–150)
ALT SERPL W P-5'-P-CCNC: 26 U/L
AMPHET UR QL SCN: NEGATIVE
ANION GAP SERPL CALCULATED.3IONS-SCNC: 15 MMOL/L (ref 7–16)
APTT PPP: 31.5 SECONDS (ref 25.2–37.3)
AST SERPL W P-5'-P-CCNC: 33 U/L (ref 11–45)
BACTERIA #/AREA URNS HPF: ABNORMAL /HPF
BARBITURATES UR QL SCN: NEGATIVE
BASOPHILS # BLD AUTO: 0.03 K/UL (ref 0–0.2)
BASOPHILS NFR BLD AUTO: 0.3 % (ref 0–1)
BENZODIAZ METAB UR QL SCN: NEGATIVE
BILIRUB SERPL-MCNC: 1.8 MG/DL
BILIRUB UR QL STRIP: NEGATIVE
BUN SERPL-MCNC: 15 MG/DL (ref 8–26)
BUN/CREAT SERPL: 16 (ref 6–20)
CALCIUM SERPL-MCNC: 10.7 MG/DL (ref 8.8–10)
CANNABINOIDS UR QL SCN: NEGATIVE
CHLORIDE SERPL-SCNC: 107 MMOL/L (ref 98–107)
CLARITY UR: CLEAR
CO2 SERPL-SCNC: 24 MMOL/L (ref 23–31)
COCAINE UR QL SCN: NEGATIVE
COLOR UR: COLORLESS
CREAT SERPL-MCNC: 0.96 MG/DL (ref 0.72–1.25)
DIFFERENTIAL METHOD BLD: ABNORMAL
EGFR (NO RACE VARIABLE) (RUSH/TITUS): 86 ML/MIN/1.73M2
EOSINOPHIL # BLD AUTO: 0.08 K/UL (ref 0–0.5)
EOSINOPHIL NFR BLD AUTO: 0.8 % (ref 1–4)
ERYTHROCYTE [DISTWIDTH] IN BLOOD BY AUTOMATED COUNT: 12.3 % (ref 11.5–14.5)
GLOBULIN SER-MCNC: 3.4 G/DL (ref 2–4)
GLUCOSE SERPL-MCNC: 127 MG/DL (ref 82–115)
GLUCOSE UR STRIP-MCNC: NORMAL MG/DL
HCT VFR BLD AUTO: 50.3 % (ref 40–54)
HGB BLD-MCNC: 17.2 G/DL (ref 13.5–18)
IMM GRANULOCYTES # BLD AUTO: 0.03 K/UL (ref 0–0.04)
IMM GRANULOCYTES NFR BLD: 0.3 % (ref 0–0.4)
INR BLD: 1.04
KETONES UR STRIP-SCNC: NEGATIVE MG/DL
LEUKOCYTE ESTERASE UR QL STRIP: NEGATIVE
LYMPHOCYTES # BLD AUTO: 2.09 K/UL (ref 1–4.8)
LYMPHOCYTES NFR BLD AUTO: 20.1 % (ref 27–41)
MAGNESIUM SERPL-MCNC: 2.1 MG/DL (ref 1.6–2.6)
MCH RBC QN AUTO: 31.6 PG (ref 27–31)
MCHC RBC AUTO-ENTMCNC: 34.2 G/DL (ref 32–36)
MCV RBC AUTO: 92.3 FL (ref 80–96)
MONOCYTES # BLD AUTO: 0.65 K/UL (ref 0–0.8)
MONOCYTES NFR BLD AUTO: 6.2 % (ref 2–6)
MPC BLD CALC-MCNC: 9.5 FL (ref 9.4–12.4)
MUCOUS, UA: ABNORMAL /LPF
NEUTROPHILS # BLD AUTO: 7.54 K/UL (ref 1.8–7.7)
NEUTROPHILS NFR BLD AUTO: 72.3 % (ref 53–65)
NITRITE UR QL STRIP: NEGATIVE
NRBC # BLD AUTO: 0 X10E3/UL
NRBC, AUTO (.00): 0 %
OPIATES UR QL SCN: NEGATIVE
PCP UR QL SCN: NEGATIVE
PH UR STRIP: 8 PH UNITS
PLATELET # BLD AUTO: 209 K/UL (ref 150–400)
POTASSIUM SERPL-SCNC: 4.2 MMOL/L (ref 3.5–5.1)
PROT SERPL-MCNC: 7.6 G/DL (ref 5.8–7.6)
PROT UR QL STRIP: NEGATIVE
PROTHROMBIN TIME: 13.5 SECONDS (ref 11.7–14.7)
RBC # BLD AUTO: 5.45 M/UL (ref 4.6–6.2)
RBC # UR STRIP: ABNORMAL /UL
RBC #/AREA URNS HPF: 5 /HPF
SARS-COV-2 RDRP RESP QL NAA+PROBE: NEGATIVE
SODIUM SERPL-SCNC: 142 MMOL/L (ref 136–145)
SP GR UR STRIP: 1.01
TSH SERPL DL<=0.005 MIU/L-ACNC: 2.85 UIU/ML (ref 0.35–4.94)
UROBILINOGEN UR STRIP-ACNC: NORMAL MG/DL
WBC # BLD AUTO: 10.42 K/UL (ref 4.5–11)
WBC #/AREA URNS HPF: <1 /HPF

## 2025-04-30 PROCEDURE — 11000001 HC ACUTE MED/SURG PRIVATE ROOM

## 2025-04-30 PROCEDURE — 99223 1ST HOSP IP/OBS HIGH 75: CPT | Mod: AI,,, | Performed by: HOSPITALIST

## 2025-04-30 PROCEDURE — 80307 DRUG TEST PRSMV CHEM ANLYZR: CPT | Performed by: HOSPITALIST

## 2025-04-30 PROCEDURE — 87635 SARS-COV-2 COVID-19 AMP PRB: CPT | Performed by: FAMILY MEDICINE

## 2025-04-30 PROCEDURE — 85025 COMPLETE CBC W/AUTO DIFF WBC: CPT | Performed by: FAMILY MEDICINE

## 2025-04-30 PROCEDURE — 99285 EMERGENCY DEPT VISIT HI MDM: CPT | Mod: 25

## 2025-04-30 PROCEDURE — 96374 THER/PROPH/DIAG INJ IV PUSH: CPT

## 2025-04-30 PROCEDURE — 25000003 PHARM REV CODE 250: Performed by: FAMILY MEDICINE

## 2025-04-30 PROCEDURE — 83735 ASSAY OF MAGNESIUM: CPT | Performed by: FAMILY MEDICINE

## 2025-04-30 PROCEDURE — 93005 ELECTROCARDIOGRAM TRACING: CPT

## 2025-04-30 PROCEDURE — 25000003 PHARM REV CODE 250: Performed by: HOSPITALIST

## 2025-04-30 PROCEDURE — 63600175 PHARM REV CODE 636 W HCPCS: Performed by: FAMILY MEDICINE

## 2025-04-30 PROCEDURE — 99900035 HC TECH TIME PER 15 MIN (STAT)

## 2025-04-30 PROCEDURE — 84443 ASSAY THYROID STIM HORMONE: CPT | Performed by: HOSPITALIST

## 2025-04-30 PROCEDURE — 80053 COMPREHEN METABOLIC PANEL: CPT | Performed by: FAMILY MEDICINE

## 2025-04-30 PROCEDURE — 99406 BEHAV CHNG SMOKING 3-10 MIN: CPT

## 2025-04-30 PROCEDURE — 36415 COLL VENOUS BLD VENIPUNCTURE: CPT | Performed by: FAMILY MEDICINE

## 2025-04-30 PROCEDURE — 96375 TX/PRO/DX INJ NEW DRUG ADDON: CPT

## 2025-04-30 PROCEDURE — 94799 UNLISTED PULMONARY SVC/PX: CPT

## 2025-04-30 PROCEDURE — 63600175 PHARM REV CODE 636 W HCPCS: Performed by: HOSPITALIST

## 2025-04-30 PROCEDURE — 85610 PROTHROMBIN TIME: CPT | Performed by: FAMILY MEDICINE

## 2025-04-30 PROCEDURE — 81003 URINALYSIS AUTO W/O SCOPE: CPT | Performed by: HOSPITALIST

## 2025-04-30 PROCEDURE — 85730 THROMBOPLASTIN TIME PARTIAL: CPT | Performed by: FAMILY MEDICINE

## 2025-04-30 RX ORDER — ZOLPIDEM TARTRATE 5 MG/1
10 TABLET ORAL NIGHTLY PRN
Status: DISCONTINUED | OUTPATIENT
Start: 2025-04-30 | End: 2025-05-01 | Stop reason: HOSPADM

## 2025-04-30 RX ORDER — ACETAMINOPHEN 325 MG/1
650 TABLET ORAL
Status: COMPLETED | OUTPATIENT
Start: 2025-04-30 | End: 2025-04-30

## 2025-04-30 RX ORDER — SODIUM CHLORIDE 0.9 % (FLUSH) 0.9 %
10 SYRINGE (ML) INJECTION
Status: DISCONTINUED | OUTPATIENT
Start: 2025-04-30 | End: 2025-05-01 | Stop reason: HOSPADM

## 2025-04-30 RX ORDER — SODIUM,POTASSIUM PHOSPHATES 280-250MG
2 POWDER IN PACKET (EA) ORAL
Status: DISCONTINUED | OUTPATIENT
Start: 2025-04-30 | End: 2025-05-01 | Stop reason: HOSPADM

## 2025-04-30 RX ORDER — ALPRAZOLAM 0.25 MG/1
0.25 TABLET ORAL DAILY PRN
Status: DISCONTINUED | OUTPATIENT
Start: 2025-04-30 | End: 2025-05-01 | Stop reason: HOSPADM

## 2025-04-30 RX ORDER — ATORVASTATIN CALCIUM 80 MG/1
80 TABLET, FILM COATED ORAL DAILY
Status: DISCONTINUED | OUTPATIENT
Start: 2025-05-01 | End: 2025-05-01 | Stop reason: HOSPADM

## 2025-04-30 RX ORDER — NAPROXEN SODIUM 220 MG/1
81 TABLET, FILM COATED ORAL DAILY
Status: DISCONTINUED | OUTPATIENT
Start: 2025-04-30 | End: 2025-05-01 | Stop reason: HOSPADM

## 2025-04-30 RX ORDER — GLUCAGON 1 MG
1 KIT INJECTION
Status: DISCONTINUED | OUTPATIENT
Start: 2025-04-30 | End: 2025-05-01 | Stop reason: HOSPADM

## 2025-04-30 RX ORDER — ALUMINUM HYDROXIDE, MAGNESIUM HYDROXIDE, AND SIMETHICONE 1200; 120; 1200 MG/30ML; MG/30ML; MG/30ML
30 SUSPENSION ORAL 4 TIMES DAILY PRN
Status: DISCONTINUED | OUTPATIENT
Start: 2025-04-30 | End: 2025-05-01 | Stop reason: HOSPADM

## 2025-04-30 RX ORDER — ONDANSETRON HYDROCHLORIDE 2 MG/ML
4 INJECTION, SOLUTION INTRAVENOUS EVERY 8 HOURS PRN
Status: DISCONTINUED | OUTPATIENT
Start: 2025-04-30 | End: 2025-05-01 | Stop reason: HOSPADM

## 2025-04-30 RX ORDER — LANOLIN ALCOHOL/MO/W.PET/CERES
800 CREAM (GRAM) TOPICAL
Status: DISCONTINUED | OUTPATIENT
Start: 2025-04-30 | End: 2025-05-01 | Stop reason: HOSPADM

## 2025-04-30 RX ORDER — IBUPROFEN 200 MG
24 TABLET ORAL
Status: DISCONTINUED | OUTPATIENT
Start: 2025-04-30 | End: 2025-05-01 | Stop reason: HOSPADM

## 2025-04-30 RX ORDER — ACETAMINOPHEN 325 MG/1
650 TABLET ORAL EVERY 4 HOURS PRN
Status: DISCONTINUED | OUTPATIENT
Start: 2025-04-30 | End: 2025-05-01 | Stop reason: HOSPADM

## 2025-04-30 RX ORDER — ACETAMINOPHEN 325 MG/1
650 TABLET ORAL EVERY 8 HOURS PRN
Status: DISCONTINUED | OUTPATIENT
Start: 2025-04-30 | End: 2025-05-01 | Stop reason: HOSPADM

## 2025-04-30 RX ORDER — LEVOTHYROXINE SODIUM 75 UG/1
75 TABLET ORAL
Status: DISCONTINUED | OUTPATIENT
Start: 2025-05-01 | End: 2025-05-01 | Stop reason: HOSPADM

## 2025-04-30 RX ORDER — IBUPROFEN 200 MG
16 TABLET ORAL
Status: DISCONTINUED | OUTPATIENT
Start: 2025-04-30 | End: 2025-05-01 | Stop reason: HOSPADM

## 2025-04-30 RX ORDER — POLYETHYLENE GLYCOL 3350 17 G/17G
17 POWDER, FOR SOLUTION ORAL DAILY PRN
Status: DISCONTINUED | OUTPATIENT
Start: 2025-04-30 | End: 2025-05-01 | Stop reason: HOSPADM

## 2025-04-30 RX ORDER — HYDROCODONE BITARTRATE AND ACETAMINOPHEN 10; 325 MG/1; MG/1
1 TABLET ORAL EVERY 6 HOURS PRN
Refills: 0 | Status: DISCONTINUED | OUTPATIENT
Start: 2025-04-30 | End: 2025-05-01 | Stop reason: HOSPADM

## 2025-04-30 RX ORDER — ACETAMINOPHEN 500 MG
1000 TABLET ORAL EVERY 8 HOURS PRN
Status: DISCONTINUED | OUTPATIENT
Start: 2025-04-30 | End: 2025-05-01 | Stop reason: HOSPADM

## 2025-04-30 RX ORDER — NALOXONE HCL 0.4 MG/ML
0.02 VIAL (ML) INJECTION
Status: DISCONTINUED | OUTPATIENT
Start: 2025-04-30 | End: 2025-05-01 | Stop reason: HOSPADM

## 2025-04-30 RX ORDER — TALC
6 POWDER (GRAM) TOPICAL NIGHTLY PRN
Status: DISCONTINUED | OUTPATIENT
Start: 2025-04-30 | End: 2025-05-01 | Stop reason: HOSPADM

## 2025-04-30 RX ADMIN — AMIODARONE HYDROCHLORIDE 0.5 MG/MIN: 1.8 INJECTION, SOLUTION INTRAVENOUS at 08:04

## 2025-04-30 RX ADMIN — ALPRAZOLAM 0.25 MG: 0.25 TABLET ORAL at 04:04

## 2025-04-30 RX ADMIN — AMIODARONE HYDROCHLORIDE 1 MG/MIN: 1.8 INJECTION, SOLUTION INTRAVENOUS at 02:04

## 2025-04-30 RX ADMIN — AMIODARONE HYDROCHLORIDE 150 MG: 1.5 INJECTION, SOLUTION INTRAVENOUS at 02:04

## 2025-04-30 RX ADMIN — ACETAMINOPHEN 650 MG: 325 TABLET ORAL at 03:04

## 2025-04-30 RX ADMIN — ASPIRIN 81 MG CHEWABLE TABLET 81 MG: 81 TABLET CHEWABLE at 04:04

## 2025-04-30 NOTE — SUBJECTIVE & OBJECTIVE
Past Medical History:   Diagnosis Date    Anemia     Arthritis     Atrial fibrillation     Cancer     Encounter for blood transfusion     GERD (gastroesophageal reflux disease)     Hyperlipidemia     Hypertension     Sleep apnea     Thyroid disease     Type 2 diabetes mellitus without complication, without long-term current use of insulin 1/9/2024       Past Surgical History:   Procedure Laterality Date    CARDIAC CATHETERIZATION      CARDIAC VALVE SURGERY      CORONARY ARTERY BYPASS GRAFT      LEFT HEART CATHETERIZATION Left 8/1/2022    Procedure: Left heart cath;  Surgeon: Tristan Pham MD;  Location: [facility] CATH LAB;  Service: Cardiology;  Laterality: Left;  NO LV GRAM. HE HAS A BIOPROSTHETIC AO VALVE.    SPINE SURGERY         Review of patient's allergies indicates:   Allergen Reactions    Levaquin [levofloxacin]     Toradol [ketorolac]     Wasp sting [allergen ext-venom-honey bee]     Wasp venom Itching and Swelling    Zyrtec [cetirizine]     Tramadol hcl Itching       No current facility-administered medications on file prior to encounter.     Current Outpatient Medications on File Prior to Encounter   Medication Sig    ALPRAZolam (XANAX) 0.25 MG tablet Take 1 tablet (0.25 mg total) by mouth daily as needed for Anxiety.    amiodarone (PACERONE) 200 MG Tab Take 0.5 tablets (100 mg total) by mouth once daily.    amLODIPine (NORVASC) 10 MG tablet Take 1 tablet (10 mg total) by mouth once daily.    aspirin (ECOTRIN) 81 MG EC tablet Take 1 tablet (81 mg total) by mouth once daily.    atorvastatin (LIPITOR) 80 MG tablet Take 1 tablet (80 mg total) by mouth once daily.    azithromycin (Z-UMM) 250 MG tablet Take 1 tablet (250 mg total) by mouth once daily.    HYDROcodone-acetaminophen (NORCO)  mg per tablet Take 1 tablet by mouth every 6 (six) hours as needed for Pain.    imiquimod (ALDARA) 5 % cream Apply topically every evening.    levothyroxine (SYNTHROID) 75 MCG tablet Take 1 tablet (75 mcg total) by  mouth before breakfast.    lisinopriL 10 MG tablet Take 1 tablet (10 mg total) by mouth 2 (two) times daily.    methocarbamoL (ROBAXIN) 750 MG Tab Take two tablets 4 times a day as needed. (Patient not taking: Reported on 2024)    pramoxine-benzyl alcohol (ITCH-X) 1-10 % Spry Apply 1 spray topically 4 (four) times daily as needed.    tadalafiL (CIALIS) 20 MG Tab Take 1 tablet (20 mg total) by mouth every other day.    zolpidem (AMBIEN) 10 mg Tab Take 1 tablet (10 mg total) by mouth nightly as needed.     Family History       Problem Relation (Age of Onset)    Heart attack Mother, Father    Heart disease Father          Tobacco Use    Smoking status: Former     Current packs/day: 0.00     Types: Cigarettes     Quit date: 2010     Years since quittin.9    Smokeless tobacco: Never    Tobacco comments:     quit 10+ years ago   Substance and Sexual Activity    Alcohol use: Never    Drug use: Never    Sexual activity: Not on file     Review of Systems   Constitutional:  Negative for activity change, chills, fatigue and fever.   HENT:  Negative for congestion and sore throat.    Respiratory:  Negative for chest tightness.    Cardiovascular:  Positive for palpitations.   Gastrointestinal:  Negative for abdominal distention, abdominal pain and diarrhea.   Endocrine: Negative for cold intolerance and heat intolerance.   Genitourinary:  Negative for dysuria.   Musculoskeletal:  Negative for arthralgias and back pain.   Skin:  Negative for color change and rash.   Neurological:  Negative for dizziness, tremors and headaches.   Psychiatric/Behavioral:  Negative for agitation. The patient is not nervous/anxious.      Objective:     Vital Signs (Most Recent):  Pulse: (!) 149 (25 1352)  Resp: 16 (25 1352)  SpO2: 95 % (25 1352) Vital Signs (24h Range):  Pulse:  [149] 149  Resp:  [16] 16  SpO2:  [95 %] 95 %     Weight: 104.3 kg (230 lb)  Body mass index is 31.19 kg/m².     Physical Exam  Vitals and  nursing note reviewed.   Constitutional:       Appearance: He is obese. He is ill-appearing.   HENT:      Head: Normocephalic.      Right Ear: Tympanic membrane normal.      Nose: Nose normal.   Cardiovascular:      Rate and Rhythm: Regular rhythm. Tachycardia present.      Pulses: Normal pulses.      Heart sounds: Normal heart sounds.   Pulmonary:      Effort: Pulmonary effort is normal.      Breath sounds: Normal breath sounds.   Abdominal:      General: Abdomen is flat. Bowel sounds are normal.      Palpations: Abdomen is soft.   Musculoskeletal:         General: No swelling.      Cervical back: Normal range of motion.      Right lower leg: No edema.      Left lower leg: No edema.   Skin:     General: Skin is warm and dry.   Neurological:      General: No focal deficit present.      Mental Status: He is alert and oriented to person, place, and time.   Psychiatric:         Mood and Affect: Mood normal.                Significant Labs: All pertinent labs within the past 24 hours have been reviewed.    Significant Imaging: I have reviewed all pertinent imaging results/findings within the past 24 hours.

## 2025-04-30 NOTE — ED PROVIDER NOTES
Encounter Date: 4/30/2025       History     Chief Complaint   Patient presents with    Tachycardia     PT PRESENTS TO ED COMPLAINING OF TACHYCARDIA NOTICED AT HOME WITH A KNOWN HX OF AFIB. PT STATES THAT HE CALLED CARDIOLOGY OFFICE WHICH ADVICES HIM TO COME TO ED. HR NOTED TO  AT HOME WITH A NORMOTENSIVE BP.          This is a 69 y/o male,who presents to the ED with complaints of elevated BP. He states he has been off of his medications for the past week. He notes a headache but denies any CP, nausea, vomiting, or shortness of breath but he notes he is able to feel his heart racing. There are no other complaints/pain in the ED at this time. He has a known hx of Afib and is followed by Dr. Ankit MD. He has a known hx of HTN and Hyperlipidemia. He has had multiple cardiac caths in the past. He is a former smoker.      The history is provided by the patient. No  was used.       The history is provided by the patient. No  was used.     Review of patient's allergies indicates:   Allergen Reactions    Levaquin [levofloxacin]     Toradol [ketorolac]     Wasp sting [allergen ext-venom-honey bee]     Wasp venom Itching and Swelling    Zyrtec [cetirizine]     Tramadol hcl Itching     Past Medical History:   Diagnosis Date    Anemia     Arthritis     Atrial fibrillation     Cancer     Encounter for blood transfusion     GERD (gastroesophageal reflux disease)     Hyperlipidemia     Hypertension     Sleep apnea     Thyroid disease     Type 2 diabetes mellitus without complication, without long-term current use of insulin 1/9/2024     Past Surgical History:   Procedure Laterality Date    CARDIAC CATHETERIZATION      CARDIAC VALVE SURGERY      CORONARY ARTERY BYPASS GRAFT      LEFT HEART CATHETERIZATION Left 8/1/2022    Procedure: Left heart cath;  Surgeon: Tristan Pham MD;  Location: [facility] CATH LAB;  Service: Cardiology;  Laterality: Left;  NO LV GRAM. HE HAS A  BIOPROSTHETIC AO VALVE.    SPINE SURGERY       Family History   Problem Relation Name Age of Onset    Heart attack Mother      Heart attack Father      Heart disease Father       Social History[1]  Review of Systems   Respiratory:  Negative for cough, chest tightness, shortness of breath and wheezing.    Cardiovascular:  Positive for palpitations. Negative for chest pain.   All other systems reviewed and are negative.      Physical Exam     Initial Vitals [04/30/25 1352]   BP Pulse Resp Temp SpO2   -- (!) 149 16 -- 95 %      MAP       --         Physical Exam    Nursing note and vitals reviewed.  Constitutional: He appears well-developed and well-nourished.   HENT:   Head: Normocephalic and atraumatic.   Right Ear: External ear normal.   Left Ear: External ear normal.   Eyes: Conjunctivae and EOM are normal. Pupils are equal, round, and reactive to light. No scleral icterus.   Neck: Neck supple. No thyromegaly present.   Normal range of motion.  Cardiovascular:  Normal heart sounds and intact distal pulses.           No murmur heard.  Tachycardia, irregular rate c/w atrial fib RVR   Pulmonary/Chest: Breath sounds normal. No respiratory distress.   Abdominal: Abdomen is soft. Bowel sounds are normal. There is no abdominal tenderness.   Musculoskeletal:         General: No tenderness or edema. Normal range of motion.      Cervical back: Normal range of motion and neck supple.     Lymphadenopathy:     He has no cervical adenopathy.   Neurological: He is alert and oriented to person, place, and time. He has normal strength. No sensory deficit. GCS score is 15. GCS eye subscore is 4. GCS verbal subscore is 5. GCS motor subscore is 6.   Skin: Skin is warm and dry. Capillary refill takes less than 2 seconds.   Psychiatric: He has a normal mood and affect. Thought content normal.         Medical Screening Exam   See Full Note    ED Course   Procedures  Labs Reviewed   COMPREHENSIVE METABOLIC PANEL - Abnormal       Result  Value    Sodium 142      Potassium 4.2      Chloride 107      CO2 24      Anion Gap 15      Glucose 127 (*)     BUN 15      Creatinine 0.96      BUN/Creatinine Ratio 16      Calcium 10.7 (*)     Total Protein 7.6      Albumin 4.2      Globulin 3.4      A/G Ratio 1.2      Bilirubin, Total 1.8 (*)     Alk Phos 91      ALT 26      AST 33      eGFR 86     CBC WITH DIFFERENTIAL - Abnormal    WBC 10.42      RBC 5.45      Hemoglobin 17.2      Hematocrit 50.3      MCV 92.3      MCH 31.6 (*)     MCHC 34.2      RDW 12.3      Platelet Count 209      MPV 9.5      Neutrophils % 72.3 (*)     Lymphocytes % 20.1 (*)     Monocytes % 6.2 (*)     Eosinophils % 0.8 (*)     Basophils % 0.3      Immature Granulocytes % 0.3      nRBC, Auto 0.0      Neutrophils, Abs 7.54      Lymphocytes, Absolute 2.09      Monocytes, Absolute 0.65      Eosinophils, Absolute 0.08      Basophils, Absolute 0.03      Immature Granulocytes, Absolute 0.03      nRBC, Absolute 0.00      Diff Type Auto     MAGNESIUM - Normal    Magnesium 2.1     PROTIME-INR - Normal    PT 13.5      INR 1.04     APTT - Normal    PTT 31.5     CBC W/ AUTO DIFFERENTIAL    Narrative:     The following orders were created for panel order CBC auto differential.  Procedure                               Abnormality         Status                     ---------                               -----------         ------                     CBC with Differential[[id number]]       Abnormal            Final result                 Please view results for these tests on the individual orders.   SARS-COV-2 RNA AMPLIFICATION, QUAL          Imaging Results    None          Medications   amiodarone 360 mg/200 mL (1.8 mg/mL) infusion (1 mg/min Intravenous New Bag 4/30/25 1428)   amiodarone 360 mg/200 mL (1.8 mg/mL) infusion (has no administration in time range)   acetaminophen tablet 650 mg (has no administration in time range)   amiodarone in dextrose 150 mg/100 mL (1.5 mg/mL) loading dose 150 mg (0 mg  Intravenous Stopped 4/30/25 1442)     Medical Decision Making  See ED course    Amount and/or Complexity of Data Reviewed  Independent Historian:      Details: patient  Labs: ordered. Decision-making details documented in ED Course.  ECG/medicine tests: ordered and independent interpretation performed. Decision-making details documented in ED Course.    Risk  Prescription drug management.  Drug therapy requiring intensive monitoring for toxicity.  Decision regarding hospitalization.    Critical Care  Total time providing critical care: 35 minutes               ED Course as of 04/30/25 1528   Wed Apr 30, 2025   1407 EKG completed and read by physician at 151 p.m. noted for AFib with RVR rate is 151.  LVH widespread ST/T wave abnormality may be due to hypertrophy versus ischemia.  Ventricular rate is 151 no MD interval QRS 84 QT//435 [KD]   1508 Telemetry reviewed noted for heart rate 122-138, /69.  Patient continues on amiodarone infusion.  Labs have returned we will contact hospitalist for admission at this time. [KD]   1509 WBC: 10.42 [KD]   1509 Hemoglobin: 17.2 [KD]   1509 Hematocrit: 50.3 [KD]   1509 Platelet Count: 209 [KD]   1509 Neutrophils Relative(!): 72.3 [KD]   1509 Neutrophils, Abs: 7.54 [KD]   1509 Magnesium : 2.1 [KD]   1509 PTT: 31.5 [KD]   1509 PT: 13.5 [KD]   1509 INR: 1.04 [KD]   1509 Glucose(!): 127 [KD]   1509 Calcium(!): 10.7 [KD]   1509 BILIRUBIN TOTAL(!): 1.8  Labs are reviewed CBC is noted for normal WBC at 10.4 although this is the upper end of normal he has 72.3% neutrophils/normal absolute neutrophils of 7.54.  Normal H/H at 17.2/50.3 and platelets of 209.  Normal Mag 2.1 coags are normal.  CMP is unremarkable other than mild elevation of glucose 127 calcium 10.7 and T bili 1.8 is elevated.  Given that patient is asymptomatic with no abdominal pain this can be evaluated as an outpatient through [facility] clinic [KD]   1513 Secure message sent to Dr. Mojica, hospitalist service for  admission at this time.  Awaiting response [KD]   1524 In room to update patient regarding today's ER findings and recommendations and further care to include admission due to need for amiodarone infusion at this time.  Patient verbalized understanding agree with plan of care [KD]      ED Course User Index  [KD] Dreyer, Kristi L, DO                           Clinical Impression:   Final diagnoses:  [R07.9] Chest pain  [I48.91] Atrial fibrillation with RVR (Primary)  [R00.2] Palpitations  [E80.6] Hyperbilirubinemia        ED Disposition Condition    Admit                     [1]   Social History  Tobacco Use    Smoking status: Former     Current packs/day: 0.00     Types: Cigarettes     Quit date: 2010     Years since quittin.9    Smokeless tobacco: Never    Tobacco comments:     quit 10+ years ago   Substance Use Topics    Alcohol use: Never    Drug use: Never        Dreyer, Kristi L, DO  25 1521

## 2025-04-30 NOTE — ED PROVIDER NOTES
Encounter Date: 4/30/2025    SCRIBE #1 NOTE: I, Kathryn Beckett, am scribing for, and in the presence of,  Kristi L. Dreyer, DO. I have scribed the entire note.       History     Chief Complaint   Patient presents with    Tachycardia     PT PRESENTS TO ED COMPLAINING OF TACHYCARDIA NOTICED AT HOME WITH A KNOWN HX OF AFIB. PT STATES THAT HE CALLED CARDIOLOGY OFFICE WHICH ADVICES HIM TO COME TO ED. HR NOTED TO  AT HOME WITH A NORMOTENSIVE BP.      This is a 71 y/o male,who presents to the ED with complaints of elevated BP. He states he has been off of his medications for the past week. He notes a headache but denies any CP, nausea, vomiting, or shortness of breath but he notes he is able to feel his heart racing. There are no other complaints/pain in the ED at this time. He has a known hx of Afib and is followed by Dr. Ankit MD. He has a known hx of HTN and Hyperlipidemia. He has had multiple cardiac caths in the past. He is a former smoker.     The history is provided by the patient. No  was used.     Review of patient's allergies indicates:   Allergen Reactions    Levaquin [levofloxacin]     Toradol [ketorolac]     Wasp sting [allergen ext-venom-honey bee]     Wasp venom Itching and Swelling    Zyrtec [cetirizine]     Tramadol hcl Itching     Past Medical History:   Diagnosis Date    Anemia     Arthritis     Atrial fibrillation     Cancer     Encounter for blood transfusion     GERD (gastroesophageal reflux disease)     Hyperlipidemia     Hypertension     Sleep apnea     Thyroid disease     Type 2 diabetes mellitus without complication, without long-term current use of insulin 1/9/2024     Past Surgical History:   Procedure Laterality Date    CARDIAC CATHETERIZATION      CARDIAC VALVE SURGERY      CORONARY ARTERY BYPASS GRAFT      LEFT HEART CATHETERIZATION Left 8/1/2022    Procedure: Left heart cath;  Surgeon: Tristan Pham MD;  Location: [facility] CATH LAB;  Service:  Cardiology;  Laterality: Left;  NO LV GRAM. HE HAS A BIOPROSTHETIC AO VALVE.    SPINE SURGERY       Family History   Problem Relation Name Age of Onset    Heart attack Mother      Heart attack Father      Heart disease Father       Social History[1]  Review of Systems   Constitutional:  Negative for chills and fever.   Respiratory:  Negative for shortness of breath.    Cardiovascular:  Positive for palpitations. Negative for chest pain.        Elevated BP.      Gastrointestinal:  Negative for nausea and vomiting.   Neurological:  Positive for headaches.   All other systems reviewed and are negative.      Physical Exam     Initial Vitals [04/30/25 1352]   BP Pulse Resp Temp SpO2   -- (!) 149 16 -- 95 %      MAP       --         Physical Exam    Nursing note and vitals reviewed.  Constitutional: He appears well-developed and well-nourished.   HENT:   Head: Normocephalic and atraumatic.   Eyes: Conjunctivae and EOM are normal. Pupils are equal, round, and reactive to light.   Neck: Neck supple.   Normal range of motion.  Cardiovascular:  Normal rate, regular rhythm, normal heart sounds and intact distal pulses.           Pulmonary/Chest: Breath sounds normal.   Abdominal: Abdomen is soft. Bowel sounds are normal.   Musculoskeletal:         General: Normal range of motion.      Cervical back: Normal range of motion and neck supple.     Neurological: He is alert and oriented to person, place, and time. He has normal strength.   Skin: Skin is warm and dry. Capillary refill takes less than 2 seconds.   Psychiatric: He has a normal mood and affect. Thought content normal.         ED Course   Procedures  Labs Reviewed - No data to display       Imaging Results    None          Medications - No data to display  Medical Decision Making  Amount and/or Complexity of Data Reviewed  Labs: ordered. Decision-making details documented in ED Course.    Risk  Prescription drug management.  Decision regarding hospitalization.           "    Attending Attestation:           Physician Attestation for Scribe:  Physician Attestation Statement for Scribe #1: I, Kristi L. Dreyer, , reviewed documentation, as scribed by Kathryn Beckett in my presence, and it is both accurate and complete.             ED Course as of 04/30/25 1515   Wed Apr 30, 2025   1407 EKG completed and read by physician at 151 p.m. noted for AFib with RVR rate is 151.  LVH widespread ST/T wave abnormality may be due to hypertrophy versus ischemia.  Ventricular rate is 151 no GA interval QRS 84 QT//435 [KD]   1508 Telemetry reviewed noted for heart rate 122-138, /69.  Patient continues on amiodarone infusion.  Labs have returned we will contact hospitalist for admission at this time. [KD]   1509 WBC: 10.42 [KD]   1509 Hemoglobin: 17.2 [KD]   1509 Hematocrit: 50.3 [KD]   1509 Platelet Count: 209 [KD]   1509 Neutrophils Relative(!): 72.3 [KD]   1509 Neutrophils, Abs: 7.54 [KD]   1509 Magnesium : 2.1 [KD]   1509 PTT: 31.5 [KD]   1509 PT: 13.5 [KD]   1509 INR: 1.04 [KD]   1509 Glucose(!): 127 [KD]   1509 Calcium(!): 10.7 [KD]   1509 BILIRUBIN TOTAL(!): 1.8  Labs are reviewed CBC is noted for normal WBC at 10.4 although this is the upper end of normal he has 72.3% neutrophils/normal absolute neutrophils of 7.54.  Normal H/H at 17.2/50.3 and platelets of 209.  Normal Mag 2.1 coags are normal.  CMP is unremarkable other than mild elevation of glucose 127 calcium 10.7 and T bili 1.8 is elevated.  Given that patient is asymptomatic with no abdominal pain this can be evaluated as an outpatient through [facility] clinic [KD]   1513 Secure message sent to Dr. Mojica, hospitalist service for admission at this time.  Awaiting response [KD]      ED Course User Index  [KD] Dreyer, Kristi L, DO                           Clinical Impression:   ***Please document a Clinical Impression and click the "Refresh" button to refresh your note and automatically pull in before signing.***              "     [1]   Social History  Tobacco Use    Smoking status: Former     Current packs/day: 0.00     Types: Cigarettes     Quit date: 2010     Years since quittin.9    Smokeless tobacco: Never    Tobacco comments:     quit 10+ years ago   Substance Use Topics    Alcohol use: Never    Drug use: Never

## 2025-04-30 NOTE — HPI
"Mr. Man is a 71yo man history of post-op A fib on chronic amiodarone without anticoagulation, AS s/p AVR with a bioprosthetic valve, CAD s/p CABG, HTN, and HLD, obesity who presents from home due to palpitations.  He has been followed closely by Cardiology electrophysiology plans for him to have a loop implant by Dr. Pham.  Emergency department he was found to have AFib with RVR with rates greater than 150.  Is admitted to hospital medicine for further care.  He has not been on anticoagulation but he has been on aspirin.  Per report he has been in sinus rhythm or rate controlled.     From Last cardiology visit:     "70 y.o. male with hx of AS s/p AVR with a bioprosthetic valve, h/o post op a fib in 2010 without recurrence on amiodarone to maintain RSR, CAD s/p CABG, HTN, and HLD,  who presents to discuss referral to EP. The Zio monitor demonstrated 70 runs SVT, longest 13.1 seconds (130 beats per minute.). I recommended that he see an EP and he was to call me back when he decided to go or not. In the meantime he was seen by his PCP, who referred him to Dr. Judd at [facility]. The patient reports that he was not happy with the interaction with Dr. Judd and does not want to see him again. I have reviewed the notes from that OV. Dr. Judd has recommended a Loop implant and the patient would like Dr. Pham to do that here. The patient would like to see Dr. Brito in [place], [place] if needed pending information from the Loop.      2/5/2025 present for follow up to discuss the results of his Lexiscan/echo and reassess symptoms.  Patient had "flutters" after last appointment. He is concerned may have been a fib. He has had no known episodes of a fib since his post op a fib in 2010. He admits that he was anxious at the time and his bp was high due to anxiety. He went to see his DIL, who is a nurse who calmed him and took his HR and BP which was improved. He has had no other episodes.     12/9/2024 " "presents for routine follow up. Patient reports 3 episodes of chest discomfort in the last 6 months that he describes a "deep soreness" of the left side of his chest. There is no triggering or relieving factors identified and it lasts for no more than one minute. He has had no chest discomfort in the last 2-3 weeks. He states he had a similar discomfort 2 years ago prior to his LHC which demonstrated patent but atrophied PERLA to the OM with no significant disease of the native OM (approx 50% distal Lcx lesion); patent LIMA to the LAD and patent Left radial artery graft the PDA. He also reports occasional palpitations with exertion described as heart racing.  Patient ran out of his thyroid hormone replacement therapy approx one month ago'        His EKG today demonstrated sinus tachycardia with ST changes in the inferior and anterolateral leads. The patient is not having active chest pain. He does not want LHC. I reviewed the EKG and case with Dr. Corea.      6/5/2024 presents for routine follow up. He is doing well and denies complaints.      12/5/2023  presents for routine follow up. He remains physically active and asymptomatic.      6/5/2023 presents for routine follow up. He remains physically active with out issues.   3/1/2023 presents for routine follow up. He states he is doing will but does have indigestion at times. He has no exertional symptoms."      "

## 2025-04-30 NOTE — CARE UPDATE
Patient feels as though he is stable enough for discharge given his NL HR (70). Discussed the importance of monitoring and the possible complications of Afib if he were to leave. Pt communicated understanding. Pt agrees to stay overnight for additional monitoring and treatment.

## 2025-04-30 NOTE — PHARMACY MED REC
"Admission Medication History     The home medication history was taken by Elva Kimball.    You may go to "Admission" then "Reconcile Home Medications" tabs to review and/or act upon these items.     The home medication list has been updated by the Pharmacy department.   Please read ALL comments highlighted in yellow.   Please address this information as you see fit.    Feel free to contact us if you have any questions or require assistance.      Patient reports no longer taking the following medication(s). The medication(s) listed below were removed from the home medication list. Please reorder if appropriate:  Azithromycin 250 mg  Imiquimod 5% cream  Methocarbamol 750 mg  Pramoxine-benzyl alcohol 1-10% spray  Zolpidem 10 mg    Medications listed below were obtained from: Patient/family and Analytic software- Remote Assistant  (Not in a hospital admission)        Current Outpatient Medications on File Prior to Encounter   Medication Sig Dispense Refill Last Dose/Taking    ALPRAZolam (XANAX) 0.25 MG tablet Take 1 tablet (0.25 mg total) by mouth daily as needed for Anxiety. 90 tablet 3 4/30/2025 at  1:05 PM    amiodarone (PACERONE) 200 MG Tab Take 0.5 tablets (100 mg total) by mouth once daily.   4/23/2025    amLODIPine (NORVASC) 10 MG tablet Take 1 tablet (10 mg total) by mouth once daily. 90 tablet 3 4/23/2025    atorvastatin (LIPITOR) 80 MG tablet Take 1 tablet (80 mg total) by mouth once daily. 90 tablet 3 4/23/2025    HYDROcodone-acetaminophen (NORCO)  mg per tablet Take 1 tablet by mouth every 6 (six) hours as needed for Pain. 120 tablet 0 4/23/2025    aspirin (ECOTRIN) 81 MG EC tablet Take 1 tablet (81 mg total) by mouth once daily. 90 tablet 3     levothyroxine (SYNTHROID) 75 MCG tablet Take 1 tablet (75 mcg total) by mouth before breakfast. 90 tablet 3 4/23/2025    lisinopriL 10 MG tablet Take 1 tablet (10 mg total) by mouth 2 (two) times daily. 180 tablet 3 4/23/2025    tadalafiL (CIALIS) 20 MG Tab Take 1 " tablet (20 mg total) by mouth every other day. 10 tablet 11 4/23/2025    [DISCONTINUED] azithromycin (Z-UMM) 250 MG tablet Take 1 tablet (250 mg total) by mouth once daily. 6 tablet 1     [DISCONTINUED] imiquimod (ALDARA) 5 % cream Apply topically every evening.       [DISCONTINUED] methocarbamoL (ROBAXIN) 750 MG Tab Take two tablets 4 times a day as needed. (Patient not taking: Reported on 6/5/2024) 240 tablet 1     [DISCONTINUED] pramoxine-benzyl alcohol (ITCH-X) 1-10 % Spry Apply 1 spray topically 4 (four) times daily as needed. 59.1 mL 5     [DISCONTINUED] zolpidem (AMBIEN) 10 mg Tab Take 1 tablet (10 mg total) by mouth nightly as needed. 90 tablet 1          Potential issues to be addressed PRIOR TO DISCHARGE  No issues identified. Patient mentioned it's been a week since he last had his medications, but it's because he traveled out of state and then had a family emergency in another state that lengthened his travel period, causing him to miss his medications.    Elva Kimball  Medication Access Specialist  EXT. [phone]    .

## 2025-04-30 NOTE — ED NOTES
Pt called on call bell to ask for a doctor to speak with, states his heart rate was down and he wanted to go home. Informed him of the importance of staying in the hospital and being monitored, pt states he would still like to speak with a doctor. Informed I would message the admitting doctor and get them to come see him.

## 2025-04-30 NOTE — ASSESSMENT & PLAN NOTE
Patient has long standing persistent (>12 months) atrial fibrillation. Patient is currently in atrial fibrillation. [id number] Score: 2. The patients heart rate in the last 24 hours is as follows:  Pulse  Min: 149  Max: 149     Antiarrhythmics  amiodarone 360 mg/200 mL (1.8 mg/mL) infusion, Continuous, Intravenous  amiodarone 360 mg/200 mL (1.8 mg/mL) infusion, Continuous, Intravenous    Anticoagulants       Plan  - Replete lytes with a goal of K>4, Mg >2  - Patient is not anticoagulated due to unclear reasons.  I will add eliquis until further evaluation by cardiology.  - Patient's afib is currently uncontrolled. Will adjust treatment as follows: continue amiodarone gtt.

## 2025-04-30 NOTE — ASSESSMENT & PLAN NOTE
Echocardiogram with evidence of aortic stenosis that is valve replaced . The patient's most recent echocardiogram result is listed below. We will manage the valvular abnormality by continue current med    Echo  Result Date: 12/23/2024    Left Ventricle: The left ventricle is normal in size. Normal wall   thickness. There is normal systolic function with a visually estimated   ejection fraction of 60 - 65%. There is normal diastolic function.    Right Ventricle: Normal right ventricular cavity size. Systolic   function is normal.    Aortic Valve: There is a bioprosthetic valve in the aortic position.    Mitral Valve: There is mild regurgitation.    Tricuspid Valve: There is mild regurgitation.    Pulmonic Valve: There is mild regurgitation.

## 2025-04-30 NOTE — H&P
"  [facility] - Emergency Department  Hospital Medicine  History & Physical    Patient Name: Shreyas Man  MRN: [id number]  Patient Class: IP- Inpatient  Admission Date: 4/30/2025  Attending Physician: Dreyer, Kristi L, DO   Primary Care Provider: Dina Sebastian DO         Patient information was obtained from patient, past medical records, and ER records.     Subjective:     Principal Problem:Atrial fibrillation with RVR    Chief Complaint:   Chief Complaint   Patient presents with    Tachycardia     PT PRESENTS TO ED COMPLAINING OF TACHYCARDIA NOTICED AT HOME WITH A KNOWN HX OF AFIB. PT STATES THAT HE CALLED CARDIOLOGY OFFICE WHICH ADVICES HIM TO COME TO ED. HR NOTED TO  AT HOME WITH A NORMOTENSIVE BP.         HPI: Mr. Man is a 69yo man history of post-op A fib on chronic amiodarone without anticoagulation, AS s/p AVR with a bioprosthetic valve, CAD s/p CABG, HTN, and HLD, obesity who presents from home due to palpitations.  He has been followed closely by Cardiology electrophysiology plans for him to have a loop implant by Dr. Pham.  Emergency department he was found to have AFib with RVR with rates greater than 150.  Is admitted to hospital medicine for further care.  He has not been on anticoagulation but he has been on aspirin.  Per report he has been in sinus rhythm or rate controlled.     From Last cardiology visit:     "70 y.o. male with hx of AS s/p AVR with a bioprosthetic valve, h/o post op a fib in 2010 without recurrence on amiodarone to maintain RSR, CAD s/p CABG, HTN, and HLD,  who presents to discuss referral to EP. The Zio monitor demonstrated 70 runs SVT, longest 13.1 seconds (130 beats per minute.). I recommended that he see an EP and he was to call me back when he decided to go or not. In the meantime he was seen by his PCP, who referred him to Dr. Judd at [facility]. The patient reports that he was not happy with the interaction with Dr. Judd and " "does not want to see him again. I have reviewed the notes from that OV. Dr. Judd has recommended a Loop implant and the patient would like Dr. Pham to do that here. The patient would like to see Dr. Brito in [place], [place] if needed pending information from the Loop.      2/5/2025 present for follow up to discuss the results of his Lexiscan/echo and reassess symptoms.  Patient had "flutters" after last appointment. He is concerned may have been a fib. He has had no known episodes of a fib since his post op a fib in 2010. He admits that he was anxious at the time and his bp was high due to anxiety. He went to see his DIL, who is a nurse who calmed him and took his HR and BP which was improved. He has had no other episodes.     12/9/2024 presents for routine follow up. Patient reports 3 episodes of chest discomfort in the last 6 months that he describes a "deep soreness" of the left side of his chest. There is no triggering or relieving factors identified and it lasts for no more than one minute. He has had no chest discomfort in the last 2-3 weeks. He states he had a similar discomfort 2 years ago prior to his LHC which demonstrated patent but atrophied PERLA to the OM with no significant disease of the native OM (approx 50% distal Lcx lesion); patent LIMA to the LAD and patent Left radial artery graft the PDA. He also reports occasional palpitations with exertion described as heart racing.  Patient ran out of his thyroid hormone replacement therapy approx one month ago'        His EKG today demonstrated sinus tachycardia with ST changes in the inferior and anterolateral leads. The patient is not having active chest pain. He does not want LHC. I reviewed the EKG and case with Dr. Corea.      6/5/2024 presents for routine follow up. He is doing well and denies complaints.      12/5/2023  presents for routine follow up. He remains physically active and asymptomatic.      6/5/2023 presents for routine follow up. He " "remains physically active with out issues.   3/1/2023 presents for routine follow up. He states he is doing will but does have indigestion at times. He has no exertional symptoms."        Past Medical History:   Diagnosis Date    Anemia     Arthritis     Atrial fibrillation     Cancer     Encounter for blood transfusion     GERD (gastroesophageal reflux disease)     Hyperlipidemia     Hypertension     Sleep apnea     Thyroid disease     Type 2 diabetes mellitus without complication, without long-term current use of insulin 1/9/2024       Past Surgical History:   Procedure Laterality Date    CARDIAC CATHETERIZATION      CARDIAC VALVE SURGERY      CORONARY ARTERY BYPASS GRAFT      LEFT HEART CATHETERIZATION Left 8/1/2022    Procedure: Left heart cath;  Surgeon: Tristan Pham MD;  Location: [facility] CATH LAB;  Service: Cardiology;  Laterality: Left;  NO LV GRAM. HE HAS A BIOPROSTHETIC AO VALVE.    SPINE SURGERY         Review of patient's allergies indicates:   Allergen Reactions    Levaquin [levofloxacin]     Toradol [ketorolac]     Wasp sting [allergen ext-venom-honey bee]     Wasp venom Itching and Swelling    Zyrtec [cetirizine]     Tramadol hcl Itching       No current facility-administered medications on file prior to encounter.     Current Outpatient Medications on File Prior to Encounter   Medication Sig    ALPRAZolam (XANAX) 0.25 MG tablet Take 1 tablet (0.25 mg total) by mouth daily as needed for Anxiety.    amiodarone (PACERONE) 200 MG Tab Take 0.5 tablets (100 mg total) by mouth once daily.    amLODIPine (NORVASC) 10 MG tablet Take 1 tablet (10 mg total) by mouth once daily.    aspirin (ECOTRIN) 81 MG EC tablet Take 1 tablet (81 mg total) by mouth once daily.    atorvastatin (LIPITOR) 80 MG tablet Take 1 tablet (80 mg total) by mouth once daily.    azithromycin (Z-UMM) 250 MG tablet Take 1 tablet (250 mg total) by mouth once daily.    HYDROcodone-acetaminophen (NORCO)  mg per tablet Take 1 " tablet by mouth every 6 (six) hours as needed for Pain.    imiquimod (ALDARA) 5 % cream Apply topically every evening.    levothyroxine (SYNTHROID) 75 MCG tablet Take 1 tablet (75 mcg total) by mouth before breakfast.    lisinopriL 10 MG tablet Take 1 tablet (10 mg total) by mouth 2 (two) times daily.    methocarbamoL (ROBAXIN) 750 MG Tab Take two tablets 4 times a day as needed. (Patient not taking: Reported on 2024)    pramoxine-benzyl alcohol (ITCH-X) 1-10 % Spry Apply 1 spray topically 4 (four) times daily as needed.    tadalafiL (CIALIS) 20 MG Tab Take 1 tablet (20 mg total) by mouth every other day.    zolpidem (AMBIEN) 10 mg Tab Take 1 tablet (10 mg total) by mouth nightly as needed.     Family History       Problem Relation (Age of Onset)    Heart attack Mother, Father    Heart disease Father          Tobacco Use    Smoking status: Former     Current packs/day: 0.00     Types: Cigarettes     Quit date: 2010     Years since quittin.9    Smokeless tobacco: Never    Tobacco comments:     quit 10+ years ago   Substance and Sexual Activity    Alcohol use: Never    Drug use: Never    Sexual activity: Not on file     Review of Systems   Constitutional:  Negative for activity change, chills, fatigue and fever.   HENT:  Negative for congestion and sore throat.    Respiratory:  Negative for chest tightness.    Cardiovascular:  Positive for palpitations.   Gastrointestinal:  Negative for abdominal distention, abdominal pain and diarrhea.   Endocrine: Negative for cold intolerance and heat intolerance.   Genitourinary:  Negative for dysuria.   Musculoskeletal:  Negative for arthralgias and back pain.   Skin:  Negative for color change and rash.   Neurological:  Negative for dizziness, tremors and headaches.   Psychiatric/Behavioral:  Negative for agitation. The patient is not nervous/anxious.      Objective:     Vital Signs (Most Recent):  Pulse: (!) 149 (25 1352)  Resp: 16 (25 1352)  SpO2: 95  % (04/30/25 1352) Vital Signs (24h Range):  Pulse:  [149] 149  Resp:  [16] 16  SpO2:  [95 %] 95 %     Weight: 104.3 kg (230 lb)  Body mass index is 31.19 kg/m².     Physical Exam  Vitals and nursing note reviewed.   Constitutional:       Appearance: He is obese. He is ill-appearing.   HENT:      Head: Normocephalic.      Right Ear: Tympanic membrane normal.      Nose: Nose normal.   Cardiovascular:      Rate and Rhythm: Regular rhythm. Tachycardia present.      Pulses: Normal pulses.      Heart sounds: Normal heart sounds.   Pulmonary:      Effort: Pulmonary effort is normal.      Breath sounds: Normal breath sounds.   Abdominal:      General: Abdomen is flat. Bowel sounds are normal.      Palpations: Abdomen is soft.   Musculoskeletal:         General: No swelling.      Cervical back: Normal range of motion.      Right lower leg: No edema.      Left lower leg: No edema.   Skin:     General: Skin is warm and dry.   Neurological:      General: No focal deficit present.      Mental Status: He is alert and oriented to person, place, and time.   Psychiatric:         Mood and Affect: Mood normal.                Significant Labs: All pertinent labs within the past 24 hours have been reviewed.    Significant Imaging: I have reviewed all pertinent imaging results/findings within the past 24 hours.  Assessment/Plan:     Assessment & Plan  Atrial fibrillation with RVR  Patient has long standing persistent (>12 months) atrial fibrillation. Patient is currently in atrial fibrillation. [id number] Score: 2. The patients heart rate in the last 24 hours is as follows:  Pulse  Min: 149  Max: 149     Antiarrhythmics  amiodarone 360 mg/200 mL (1.8 mg/mL) infusion, Continuous, Intravenous  amiodarone 360 mg/200 mL (1.8 mg/mL) infusion, Continuous, Intravenous    Anticoagulants       Plan  - Replete lytes with a goal of K>4, Mg >2  - Patient is not anticoagulated due to unclear reasons.  I will add eliquis until further evaluation by  cardiology.  - Patient's afib is currently uncontrolled. Will adjust treatment as follows: continue amiodarone gtt.      Make sure to refresh the medications so your note is up to date!    :[id number]}  Nonrheumatic aortic valve stenosis  Echocardiogram with evidence of aortic stenosis that is  valve replaced  . The patient's most recent echocardiogram result is listed below. We will manage the valvular abnormality by continue current med    Echo  Result Date: 12/23/2024    Left Ventricle: The left ventricle is normal in size. Normal wall   thickness. There is normal systolic function with a visually estimated   ejection fraction of 60 - 65%. There is normal diastolic function.    Right Ventricle: Normal right ventricular cavity size. Systolic   function is normal.    Aortic Valve: There is a bioprosthetic valve in the aortic position.    Mitral Valve: There is mild regurgitation.    Tricuspid Valve: There is mild regurgitation.    Pulmonic Valve: There is mild regurgitation.         Hx of aortic valve replacement      Hypertension  Patient's blood pressure range in the last 24 hours was: No data recorded.The patient's inpatient anti-hypertensive regimen is listed below:  Current Antihypertensives       Plan  - BP is controlled, no changes needed to their regimen    Hyperlipidemia  Continue home staff    Type 2 diabetes mellitus without complication, without long-term current use of insulin  Insulin sliding scale    Malignant melanoma, unspecified site  Continue outpatient care    Palpitations  Palpitation setting of AFib with RVR.    Hx of CABG  Continue outpatient management hospitalized and defer to Cardiology    VTE Risk Mitigation (From admission, onward)           Ordered     IP VTE HIGH RISK PATIENT  Once         04/30/25 1558     Place sequential compression device  Until discontinued         04/30/25 1558     Reason for No Pharmacological VTE Prophylaxis  Once        Question:  Reasons:  Answer:  Already  adequately anticoagulated on oral Anticoagulants    04/30/25 1558                                    Anny Crowley MD  Department of Hospital Medicine  [facility] - Emergency Department

## 2025-04-30 NOTE — H&P
"  [facility] - Emergency Department  Hospital Medicine  History & Physical    Patient Name: Shreyas Man  MRN: [id number]  Patient Class: IP- Inpatient  Admission Date: 4/30/2025  Attending Physician: Dreyer, Kristi L, DO   Primary Care Provider: Dina Sebastian DO         Patient information was obtained from patient, past medical records, and ER records.     Subjective:     Principal Problem:Atrial fibrillation with RVR    Chief Complaint:   Chief Complaint   Patient presents with    Tachycardia     PT PRESENTS TO ED COMPLAINING OF TACHYCARDIA NOTICED AT HOME WITH A KNOWN HX OF AFIB. PT STATES THAT HE CALLED CARDIOLOGY OFFICE WHICH ADVICES HIM TO COME TO ED. HR NOTED TO  AT HOME WITH A NORMOTENSIVE BP.         HPI: Mr. Man is a 71yo man history of post-op A fib on chronic amiodarone without anticoagulation, AS s/p AVR with a bioprosthetic valve, CAD s/p CABG, HTN, and HLD, obesity who presents from home due to palpitations.  He has been followed closely by Cardiology electrophysiology plans for him to have a loop implant by Dr. Pham.  Emergency department he was found to have AFib with RVR with rates greater than 150.  Is admitted to hospital medicine for further care.  He has not been on anticoagulation but he has been on aspirin.  Per report he has been in sinus rhythm or rate controlled.     From Last cardiology visit:     "70 y.o. male with hx of AS s/p AVR with a bioprosthetic valve, h/o post op a fib in 2010 without recurrence on amiodarone to maintain RSR, CAD s/p CABG, HTN, and HLD,  who presents to discuss referral to EP. The Zio monitor demonstrated 70 runs SVT, longest 13.1 seconds (130 beats per minute.). I recommended that he see an EP and he was to call me back when he decided to go or not. In the meantime he was seen by his PCP, who referred him to Dr. Judd at [facility]. The patient reports that he was not happy with the interaction with Dr. Judd and " "does not want to see him again. I have reviewed the notes from that OV. Dr. Judd has recommended a Loop implant and the patient would like Dr. Pham to do that here. The patient would like to see Dr. Brito in [place], [place] if needed pending information from the Loop.      2/5/2025 present for follow up to discuss the results of his Lexiscan/echo and reassess symptoms.  Patient had "flutters" after last appointment. He is concerned may have been a fib. He has had no known episodes of a fib since his post op a fib in 2010. He admits that he was anxious at the time and his bp was high due to anxiety. He went to see his DIL, who is a nurse who calmed him and took his HR and BP which was improved. He has had no other episodes.     12/9/2024 presents for routine follow up. Patient reports 3 episodes of chest discomfort in the last 6 months that he describes a "deep soreness" of the left side of his chest. There is no triggering or relieving factors identified and it lasts for no more than one minute. He has had no chest discomfort in the last 2-3 weeks. He states he had a similar discomfort 2 years ago prior to his LHC which demonstrated patent but atrophied PERLA to the OM with no significant disease of the native OM (approx 50% distal Lcx lesion); patent LIMA to the LAD and patent Left radial artery graft the PDA. He also reports occasional palpitations with exertion described as heart racing.  Patient ran out of his thyroid hormone replacement therapy approx one month ago'        His EKG today demonstrated sinus tachycardia with ST changes in the inferior and anterolateral leads. The patient is not having active chest pain. He does not want LHC. I reviewed the EKG and case with Dr. Corea.      6/5/2024 presents for routine follow up. He is doing well and denies complaints.      12/5/2023  presents for routine follow up. He remains physically active and asymptomatic.      6/5/2023 presents for routine follow up. He " "remains physically active with out issues.   3/1/2023 presents for routine follow up. He states he is doing will but does have indigestion at times. He has no exertional symptoms."        No new subjective & objective note has been filed under this hospital service since the last note was generated.    Assessment/Plan:     Assessment & Plan  Atrial fibrillation with RVR  Patient has long standing persistent (>12 months) atrial fibrillation. Patient is currently in atrial fibrillation. [id number] Score: 2. The patients heart rate in the last 24 hours is as follows:  Pulse  Min: 149  Max: 149     Antiarrhythmics  amiodarone 360 mg/200 mL (1.8 mg/mL) infusion, Continuous, Intravenous  amiodarone 360 mg/200 mL (1.8 mg/mL) infusion, Continuous, Intravenous    Anticoagulants       Plan  - Replete lytes with a goal of K>4, Mg >2  - Patient is not anticoagulated due to unclear reasons.  I will add eliquis until further evaluation by cardiology.  - Patient's afib is currently uncontrolled. Will adjust treatment as follows: continue amiodarone gtt.      Nonrheumatic aortic valve stenosis  Echocardiogram with evidence of aortic stenosis that is  valve replaced  . The patient's most recent echocardiogram result is listed below. We will manage the valvular abnormality by continue current med    Echo  Result Date: 12/23/2024    Left Ventricle: The left ventricle is normal in size. Normal wall   thickness. There is normal systolic function with a visually estimated   ejection fraction of 60 - 65%. There is normal diastolic function.    Right Ventricle: Normal right ventricular cavity size. Systolic   function is normal.    Aortic Valve: There is a bioprosthetic valve in the aortic position.    Mitral Valve: There is mild regurgitation.    Tricuspid Valve: There is mild regurgitation.    Pulmonic Valve: There is mild regurgitation.         Hx of aortic valve replacement      Hypertension  Patient's blood pressure range in the last " 24 hours was: No data recorded.The patient's inpatient anti-hypertensive regimen is listed below:  Current Antihypertensives       Plan  - BP is controlled, no changes needed to their regimen    Hyperlipidemia  Continue home staff    Type 2 diabetes mellitus without complication, without long-term current use of insulin  Insulin sliding scale    Malignant melanoma, unspecified site  Continue outpatient care    Palpitations  Palpitation setting of AFib with RVR.    Hx of CABG  Continue outpatient management hospitalized and defer to Cardiology    VTE Risk Mitigation (From admission, onward)           Ordered     IP VTE HIGH RISK PATIENT  Once         04/30/25 1558     Place sequential compression device  Until discontinued         04/30/25 1558     Reason for No Pharmacological VTE Prophylaxis  Once        Question:  Reasons:  Answer:  Already adequately anticoagulated on oral Anticoagulants    04/30/25 1558                                    Anny Crowley MD  Department of Hospital Medicine  [facility] - Emergency Department

## 2025-04-30 NOTE — ASSESSMENT & PLAN NOTE
Patient has long standing persistent (>12 months) atrial fibrillation. Patient is currently in atrial fibrillation. [id number] Score: 2. The patients heart rate in the last 24 hours is as follows:  Pulse  Min: 149  Max: 149     Antiarrhythmics  amiodarone 360 mg/200 mL (1.8 mg/mL) infusion, Continuous, Intravenous  amiodarone 360 mg/200 mL (1.8 mg/mL) infusion, Continuous, Intravenous    Anticoagulants       Plan  - Replete lytes with a goal of K>4, Mg >2  - Patient is not anticoagulated due to unclear reasons.  I will add eliquis until further evaluation by cardiology.  - Patient's afib is currently uncontrolled. Will adjust treatment as follows: continue amiodarone gtt.      Make sure to refresh the medications so your note is up to date!    :[id number]}

## 2025-05-01 VITALS
HEART RATE: 57 BPM | DIASTOLIC BLOOD PRESSURE: 91 MMHG | SYSTOLIC BLOOD PRESSURE: 160 MMHG | TEMPERATURE: 98 F | WEIGHT: 230 LBS | RESPIRATION RATE: 16 BRPM | OXYGEN SATURATION: 96 % | HEIGHT: 72 IN | BODY MASS INDEX: 31.15 KG/M2

## 2025-05-01 LAB
ANION GAP SERPL CALCULATED.3IONS-SCNC: 11 MMOL/L (ref 7–16)
BASOPHILS # BLD AUTO: 0.02 K/UL (ref 0–0.2)
BASOPHILS NFR BLD AUTO: 0.2 % (ref 0–1)
BUN SERPL-MCNC: 14 MG/DL (ref 8–26)
BUN/CREAT SERPL: 17 (ref 6–20)
CALCIUM SERPL-MCNC: 10.5 MG/DL (ref 8.8–10)
CHLORIDE SERPL-SCNC: 106 MMOL/L (ref 98–107)
CO2 SERPL-SCNC: 27 MMOL/L (ref 23–31)
CREAT SERPL-MCNC: 0.84 MG/DL (ref 0.72–1.25)
DIFFERENTIAL METHOD BLD: ABNORMAL
EGFR (NO RACE VARIABLE) (RUSH/TITUS): 94 ML/MIN/1.73M2
EOSINOPHIL # BLD AUTO: 0.19 K/UL (ref 0–0.5)
EOSINOPHIL NFR BLD AUTO: 2.1 % (ref 1–4)
ERYTHROCYTE [DISTWIDTH] IN BLOOD BY AUTOMATED COUNT: 12.2 % (ref 11.5–14.5)
GLUCOSE SERPL-MCNC: 106 MG/DL (ref 82–115)
HCT VFR BLD AUTO: 47.4 % (ref 40–54)
HGB BLD-MCNC: 16.2 G/DL (ref 13.5–18)
IMM GRANULOCYTES # BLD AUTO: 0.03 K/UL (ref 0–0.04)
IMM GRANULOCYTES NFR BLD: 0.3 % (ref 0–0.4)
LYMPHOCYTES # BLD AUTO: 1.76 K/UL (ref 1–4.8)
LYMPHOCYTES NFR BLD AUTO: 19.1 % (ref 27–41)
MAGNESIUM SERPL-MCNC: 2.1 MG/DL (ref 1.6–2.6)
MCH RBC QN AUTO: 31.8 PG (ref 27–31)
MCHC RBC AUTO-ENTMCNC: 34.2 G/DL (ref 32–36)
MCV RBC AUTO: 92.9 FL (ref 80–96)
MONOCYTES # BLD AUTO: 0.85 K/UL (ref 0–0.8)
MONOCYTES NFR BLD AUTO: 9.2 % (ref 2–6)
MPC BLD CALC-MCNC: 9.3 FL (ref 9.4–12.4)
NEUTROPHILS # BLD AUTO: 6.37 K/UL (ref 1.8–7.7)
NEUTROPHILS NFR BLD AUTO: 69.1 % (ref 53–65)
NRBC # BLD AUTO: 0 X10E3/UL
NRBC, AUTO (.00): 0 %
PLATELET # BLD AUTO: 185 K/UL (ref 150–400)
POTASSIUM SERPL-SCNC: 4.3 MMOL/L (ref 3.5–5.1)
RBC # BLD AUTO: 5.1 M/UL (ref 4.6–6.2)
SODIUM SERPL-SCNC: 140 MMOL/L (ref 136–145)
WBC # BLD AUTO: 9.22 K/UL (ref 4.5–11)

## 2025-05-01 PROCEDURE — 63600175 PHARM REV CODE 636 W HCPCS: Performed by: HOSPITALIST

## 2025-05-01 PROCEDURE — 80048 BASIC METABOLIC PNL TOTAL CA: CPT | Performed by: HOSPITALIST

## 2025-05-01 PROCEDURE — 85025 COMPLETE CBC W/AUTO DIFF WBC: CPT | Performed by: HOSPITALIST

## 2025-05-01 PROCEDURE — 99900035 HC TECH TIME PER 15 MIN (STAT)

## 2025-05-01 PROCEDURE — 99239 HOSP IP/OBS DSCHRG MGMT >30: CPT | Mod: ,,, | Performed by: HOSPITALIST

## 2025-05-01 PROCEDURE — 94761 N-INVAS EAR/PLS OXIMETRY MLT: CPT

## 2025-05-01 PROCEDURE — 36415 COLL VENOUS BLD VENIPUNCTURE: CPT | Performed by: HOSPITALIST

## 2025-05-01 PROCEDURE — 25000003 PHARM REV CODE 250: Performed by: HOSPITALIST

## 2025-05-01 PROCEDURE — 83735 ASSAY OF MAGNESIUM: CPT | Performed by: HOSPITALIST

## 2025-05-01 PROCEDURE — 25000003 PHARM REV CODE 250: Performed by: STUDENT IN AN ORGANIZED HEALTH CARE EDUCATION/TRAINING PROGRAM

## 2025-05-01 RX ORDER — METOPROLOL SUCCINATE 25 MG/1
25 TABLET, EXTENDED RELEASE ORAL DAILY
Qty: 30 TABLET | Refills: 1 | Status: SHIPPED | OUTPATIENT
Start: 2025-05-02 | End: 2025-05-15 | Stop reason: SDUPTHER

## 2025-05-01 RX ORDER — AMLODIPINE BESYLATE 10 MG/1
10 TABLET ORAL DAILY
Start: 2025-05-01 | End: 2026-05-01

## 2025-05-01 RX ORDER — METOPROLOL SUCCINATE 25 MG/1
25 TABLET, EXTENDED RELEASE ORAL DAILY
Status: DISCONTINUED | OUTPATIENT
Start: 2025-05-01 | End: 2025-05-01 | Stop reason: HOSPADM

## 2025-05-01 RX ADMIN — METOPROLOL SUCCINATE 25 MG: 25 TABLET, EXTENDED RELEASE ORAL at 11:05

## 2025-05-01 RX ADMIN — LEVOTHYROXINE SODIUM 75 MCG: 0.07 TABLET ORAL at 05:05

## 2025-05-01 RX ADMIN — ASPIRIN 81 MG CHEWABLE TABLET 81 MG: 81 TABLET CHEWABLE at 08:05

## 2025-05-01 RX ADMIN — APIXABAN 5 MG: 5 TABLET, FILM COATED ORAL at 11:05

## 2025-05-01 RX ADMIN — ATORVASTATIN CALCIUM 80 MG: 80 TABLET, FILM COATED ORAL at 08:05

## 2025-05-01 RX ADMIN — AMIODARONE HYDROCHLORIDE 0.5 MG/MIN: 1.8 INJECTION, SOLUTION INTRAVENOUS at 05:05

## 2025-05-01 NOTE — HPI
69 y/o male with PMH of AS s/p AVR with a bioprosthetic valve, CAD s/p CABG, h/o post op a fib in  on amiodarone 100mg daily to maintain NSR not on anticoagulation, recently diagnosed SVT (seen by EP specialist who recommended loop recorder for further evaluation, concerned for possible afib), Hypothyroidism, HTN, and HLD who presents from home due to palpitations. In ED he was found to have AFib with RVR with rates greater than 150 and was admitted to hospital medicine for further care. He was started on IV amiodarone protocol.    Cardiology consulted for AFib RVR.  Patient reports he recently went out of town to attend a wedding, forgot to take his medications with him, and on the way home had to make a detour to attend a , which resulted in him being without his medications for a week.  When they returned home on Monday, he took his medications and was doing fine until yesterday when he began to have palpitations.  Reports this happens occasionally but will usually subside on its own however this time it persisted so he called Dr. Pham office and was instructed to go to ED for further evaluation.  He is currently in normal sinus rhythm, heart rate in the 60s.

## 2025-05-01 NOTE — CONSULTS
[facility]  Cardiology  Consult Note    Patient Name: Shreyas Man  MRN: [id number]  Admission Date: 2025  Hospital Length of Stay: 1 days  Code Status: Prior   Attending Provider: No att. providers found   Consulting Provider: RAÚL Jimenez  Primary Care Physician: Dina Sebastian DO  Principal Problem:Atrial fibrillation with RVR    Patient information was obtained from patient, past medical records, ER records, and primary team.     Consults  Subjective:     Chief Complaint:  palpitations     HPI:   69 y/o male with PMH of AS s/p AVR with a bioprosthetic valve, CAD s/p CABG, h/o post op a fib in  on amiodarone 100mg daily to maintain NSR not on anticoagulation, recently diagnosed SVT (seen by EP specialist who recommended loop recorder for further evaluation, concerned for possible afib), Hypothyroidism, HTN, and HLD who presents from home due to palpitations. In ED he was found to have AFib with RVR with rates greater than 150 and was admitted to hospital medicine for further care. He was started on IV amiodarone protocol.    Cardiology consulted for AFib RVR.  Patient reports he recently went out of town to attend a wedding, forgot to take his medications with him, and on the way home had to make a detour to attend a , which resulted in him being without his medications for a week.  When they returned home on Monday, he took his medications and was doing fine until yesterday when he began to have palpitations.  Reports this happens occasionally but will usually subside on its own however this time it persisted so he called Dr. Pham office and was instructed to go to ED for further evaluation.  He is currently in normal sinus rhythm, heart rate in the 60s.    Past Medical History:   Diagnosis Date    Anemia     Arthritis     Atrial fibrillation     Cancer     Encounter for blood transfusion     GERD (gastroesophageal reflux disease)     Hyperlipidemia      Hypertension     Sleep apnea     Thyroid disease     Type 2 diabetes mellitus without complication, without long-term current use of insulin 1/9/2024       Past Surgical History:   Procedure Laterality Date    CARDIAC CATHETERIZATION      CARDIAC VALVE SURGERY      CORONARY ARTERY BYPASS GRAFT      LEFT HEART CATHETERIZATION Left 8/1/2022    Procedure: Left heart cath;  Surgeon: Tristan Pham MD;  Location: [facility] CATH LAB;  Service: Cardiology;  Laterality: Left;  NO LV GRAM. HE HAS A BIOPROSTHETIC AO VALVE.    SPINE SURGERY         Review of patient's allergies indicates:   Allergen Reactions    Levaquin [levofloxacin]     Toradol [ketorolac]     Wasp sting [allergen ext-venom-honey bee]     Wasp venom Itching and Swelling    Zyrtec [cetirizine]     Tramadol hcl Itching       No current facility-administered medications on file prior to encounter.     Current Outpatient Medications on File Prior to Encounter   Medication Sig    ALPRAZolam (XANAX) 0.25 MG tablet Take 1 tablet (0.25 mg total) by mouth daily as needed for Anxiety.    atorvastatin (LIPITOR) 80 MG tablet Take 1 tablet (80 mg total) by mouth once daily.    HYDROcodone-acetaminophen (NORCO)  mg per tablet Take 1 tablet by mouth every 6 (six) hours as needed for Pain.    [DISCONTINUED] amiodarone (PACERONE) 200 MG Tab Take 0.5 tablets (100 mg total) by mouth once daily.    [DISCONTINUED] amLODIPine (NORVASC) 10 MG tablet Take 1 tablet (10 mg total) by mouth once daily.    aspirin (ECOTRIN) 81 MG EC tablet Take 1 tablet (81 mg total) by mouth once daily.    levothyroxine (SYNTHROID) 75 MCG tablet Take 1 tablet (75 mcg total) by mouth before breakfast.    lisinopriL 10 MG tablet Take 1 tablet (10 mg total) by mouth 2 (two) times daily.    tadalafiL (CIALIS) 20 MG Tab Take 1 tablet (20 mg total) by mouth every other day.     Family History       Problem Relation (Age of Onset)    Heart attack Mother, Father    Heart disease Father           Tobacco Use    Smoking status: Former     Current packs/day: 0.00     Types: Cigarettes     Quit date: 2010     Years since quittin.9    Smokeless tobacco: Never    Tobacco comments:     quit 10+ years ago   Substance and Sexual Activity    Alcohol use: Never    Drug use: Never    Sexual activity: Not on file     Review of Systems   Constitutional: Negative for chills and fever.   HENT: Negative.     Eyes: Negative.    Cardiovascular:  Positive for palpitations. Negative for chest pain, dyspnea on exertion and leg swelling.   Respiratory:  Negative for shortness of breath.    Gastrointestinal: Negative.    Genitourinary: Negative.    Neurological: Negative.      Objective:     Vital Signs (Most Recent):  Temp: 97.6 °F (36.4 °C) (25 1152)  Pulse: (!) 57 (25 1152)  Resp: 16 (25 1152)  BP: (!) 160/91 (25 1152)  SpO2: 96 % (25 1259) Vital Signs (24h Range):  Temp:  [97.5 °F (36.4 °C)-98.2 °F (36.8 °C)] 97.6 °F (36.4 °C)  Pulse:  [56-72] 57  Resp:  [9-23] 16  SpO2:  [89 %-98 %] 96 %  BP: (127-160)/(80-94) 160/91     Weight: 104.3 kg (230 lb)  Body mass index is 31.19 kg/m².    SpO2: 96 %       No intake or output data in the 24 hours ending 25 1729    Lines/Drains/Airways       None                    Physical Exam  Vitals reviewed.   Constitutional:       General: He is not in acute distress.  HENT:      Nose: Nose normal.      Mouth/Throat:      Mouth: Mucous membranes are moist.      Pharynx: Oropharynx is clear.   Eyes:      General: No scleral icterus.     Pupils: Pupils are equal, round, and reactive to light.   Cardiovascular:      Rate and Rhythm: Normal rate and regular rhythm.   Pulmonary:      Effort: Pulmonary effort is normal.      Breath sounds: Normal breath sounds.   Abdominal:      General: Bowel sounds are normal.      Palpations: Abdomen is soft.   Musculoskeletal:      Right lower leg: No edema.      Left lower leg: No edema.   Skin:     General: Skin  "is warm and dry.   Neurological:      Mental Status: He is alert and oriented to person, place, and time.          Significant Labs: ABG: No results for input(s): "PH", "PCO2", "HCO3", "POCSATURATED", "BE" in the last 48 hours., Blood Culture: No results for input(s): "LABBLOO" in the last 48 hours., BMP:   Recent Labs   Lab 04/30/25  1414 05/01/25  0433   * 106    140   K 4.2 4.3    106   CO2 24 27   BUN 15 14   CREATININE 0.96 0.84   CALCIUM 10.7* 10.5*   MG 2.1 2.1   , CMP   Recent Labs   Lab 04/30/25  1414 05/01/25  0433    140   K 4.2 4.3    106   CO2 24 27   * 106   BUN 15 14   CREATININE 0.96 0.84   CALCIUM 10.7* 10.5*   PROT 7.6  --    ALBUMIN 4.2  --    BILITOT 1.8*  --    ALKPHOS 91  --    AST 33  --    ALT 26  --    ANIONGAP 15 11   , CBC   Recent Labs   Lab 04/30/25  1414 05/01/25  0433   WBC 10.42 9.22   HGB 17.2 16.2   HCT 50.3 47.4    185   , INR   Recent Labs   Lab 04/30/25  1414   INR 1.04   , Lipid Panel No results for input(s): "CHOL", "HDL", "LDLCALC", "TRIG", "CHOLHDL" in the last 48 hours., and Troponin No results for input(s): "TROPONINIHS" in the last 48 hours.    Significant Imaging: Cardiac Cath: Results for orders placed during the hospital encounter of 08/01/22    Cardiac catheterization    Conclusion  · The left ventricular systolic function was normal.  · The left ventricular end diastolic pressure was normal.  · The pre-procedure left ventricular end diastolic pressure was 20.  · The ejection fraction was calculated to be 60%.  · The left ventricular ejection fraction was grossly normal.  · There was no mitral valve regurgitation.  · The Dist Cx lesion was 50% stenosed.  · The Prox LAD lesion was 80% stenosed.  · The estimated blood loss was none.  · There was three vessel coronary artery disease.  · Zenia to OM is atrophiied with no sig disease in the native om.    The procedure log was documented by Documenter: Sherron Watson RT and " verified by Tristan Pham MD.    Date: 8/1/2022  Time: 12:24 PM  and     Echocardiogram: Transthoracic echo (TTE) complete (Cupid Only):   Results for orders placed or performed during the hospital encounter of 12/23/24   Echo   Result Value Ref Range    LVOT stroke volume 87.6 cm3    LVIDd 4.8 3.5 - 6.0 cm    LV Systolic Volume 17.10 mL    LVIDs 2.3 2.1 - 4.0 cm    LV ESV A2C 60.32 mL    LV Diastolic Volume 106.30 mL    LV ESV A4C 52.63 mL    Left Ventricular End Systolic Volume by Teichholz Method 17.10 mL    Left Ventricular End Diastolic Volume by Teichholz Method 106.30 mL    IVS 1.0 0.6 - 1.1 cm    LVOT diameter 2.0 cm    LVOT area 3.1 cm2    FS 52.1 (A) 28 - 44 %    Left Ventricle Relative Wall Thickness 0.38 cm    PW 0.9 0.6 - 1.1 cm    LV mass 158.8 g    MV Peak E Yariel 1.27 m/s    TDI LATERAL 0.11 m/s    TDI SEPTAL 0.09 m/s    E/E' ratio 12.70 m/s    MV Peak A Yariel 0.95 m/s    TR Max Yariel 2.51 m/s    E/A ratio 1.34     E wave deceleration time 267.05 msec    LV SEPTAL E/E' RATIO 14.11 m/s    LV LATERAL E/E' RATIO 11.55 m/s    LVOT peak yariel 1.2 m/s    Left Ventricular Outflow Tract Mean Velocity 0.81 cm/s    Left Ventricular Outflow Tract Mean Gradient 3.06 mmHg    TAPSE 2.12 cm    LA Vol (MOD) 58.83 mL    RA area length vol 33.16 mL    RA Area 15.3 cm2    RA Vol 33.05 mL    AV mean gradient 16.0 mmHg    AV peak gradient 29.2 mmHg    Ao peak yariel 2.7 m/s    Ao VTI 65.7 cm    LVOT peak VTI 27.9 cm    AV valve area 1.3 cm²    AV Velocity Ratio 0.44     AV index (prosthetic) 0.42     RADHA by Velocity Ratio 1.4 cm²    Mr max yariel 4.30 m/s    MV stenosis pressure 1/2 time 77.45 ms    MV valve area p 1/2 method 2.84 cm2    Triscuspid Valve Regurgitation Peak Gradient 25 mmHg    PV PEAK VELOCITY 1.03 m/s    PV peak gradient 4 mmHg    Ao root annulus 2.19 cm    IVC diameter 1.47 cm    Mean e' 0.10 m/s    LA area A4C 19.18 cm2    LA area A2C 19.25 cm2    AORTIC VALVE CUSP SEPERATION 1.76 cm    RV- keith basal diam 3.9  cm    RV-keith mid d 1.7 cm    RV Basal Diameter 6.66 cm    RV-keith length 6.7 cm    RV mid diameter 1.74 cm    RV/LV Ratio 0.81 cm    BSA 2.3 m2    LV Systolic Volume Index 7.6 mL/m2    LV Diastolic Volume Index 47.04 mL/m2    LV Mass Index 70.3 g/m2    LEV (MOD) 26.0 mL/m2    ZLVIDS -6.17     ZLVIDD -5.47     Narrative      Left Ventricle: The left ventricle is normal in size. Normal wall   thickness. There is normal systolic function with a visually estimated   ejection fraction of 60 - 65%. There is normal diastolic function.    Right Ventricle: Normal right ventricular cavity size. Systolic   function is normal.    Aortic Valve: There is a bioprosthetic valve in the aortic position.    Mitral Valve: There is mild regurgitation.    Tricuspid Valve: There is mild regurgitation.    Pulmonic Valve: There is mild regurgitation.       Assessment and Plan:     * Atrial fibrillation with RVR  - patient seen and evaluated by Dr. Corea  - he is scheduled for loop recorder placement with Dr. Pham on 05/14/2025  - EKG AFib with RVR, discussed findings with patient in detail including increased risk for stroke and benefits of anticoagulation therapy with AFib.  He verbalized understanding and has agreed to start Eliquis.  - Dr. Corea recommends discontinuing long term amiodarone (due to history of hypothyroidism) and starting low dose BB Toprol 25 daily, patient is agreeable  - will have him follow up with PRABHA Donaldson in 1 week to further discuss if loop recorder is still needed at this point given newly documented afib with rvr and to discuss follow up with EP specialist for possible ablation if BB therapy does not control rate    Hx of CABG  - followed outpatient by Dr. Pham/RAMYA Donaldson    Paroxysmal SVT (supraventricular tachycardia)  - [facility] [place] 2/2025    Basic rhythm is sinus, average heart rate 65 beats per minute.    Occasional PACs, couplets and triplets.  70 runs SVT, longest 13.1 seconds (130 beats per minute.).     Rare PVCs and couplets.  One 6 beat run VT, max heart rate 203 beats per minute.    Two patient triggered events during NSR (65-96), PACs and PVCs.    Nonrheumatic aortic valve stenosis  - AVR 8/5/2010 bioprosthetic valve        VTE Risk Mitigation (From admission, onward)           Ordered     IP VTE HIGH RISK PATIENT  Once         04/30/25 3256                    Thank you for your consult. I will sign off. Please contact us if you have any additional questions.    RAÚL Jimenez  Cardiology   [facility]

## 2025-05-01 NOTE — DISCHARGE SUMMARY
"[facility] Hospital Medicine  Discharge Summary      Patient Name: Shreyas Man  MRN: [id number]  HEIDI: [id number]  Patient Class: IP- Inpatient  Admission Date: 4/30/2025  Hospital Length of Stay: 1 days  Discharge Date and Time: 05/01/2025 2:21 PM  Attending Physician: Anny Crowley MD   Discharging Provider: Anny Crowley MD  Primary Care Provider: Dina Sebastian DO    Primary Care Team: Networked reference to record PCT     HPI:   Mr. Man is a 71yo man history of post-op A fib on chronic amiodarone without anticoagulation, AS s/p AVR with a bioprosthetic valve, CAD s/p CABG, HTN, and HLD, obesity who presents from home due to palpitations.  He has been followed closely by Cardiology electrophysiology plans for him to have a loop implant by Dr. Pham.  Emergency department he was found to have AFib with RVR with rates greater than 150.  Is admitted to hospital medicine for further care.  He has not been on anticoagulation but he has been on aspirin.  Per report he has been in sinus rhythm or rate controlled.     From Last cardiology visit:     "70 y.o. male with hx of AS s/p AVR with a bioprosthetic valve, h/o post op a fib in 2010 without recurrence on amiodarone to maintain RSR, CAD s/p CABG, HTN, and HLD,  who presents to discuss referral to EP. The Zio monitor demonstrated 70 runs SVT, longest 13.1 seconds (130 beats per minute.). I recommended that he see an EP and he was to call me back when he decided to go or not. In the meantime he was seen by his PCP, who referred him to Dr. Judd at [facility] in [place]. The patient reports that he was not happy with the interaction with Dr. Judd and does not want to see him again. I have reviewed the notes from that OV. Dr. Judd has recommended a Loop implant and the patient would like Dr. Pham to do that here. The patient would like to see Dr. Brito in [place], [place] if needed pending information from " "the Loop.      2/5/2025 present for follow up to discuss the results of his Lexiscan/echo and reassess symptoms.  Patient had "flutters" after last appointment. He is concerned may have been a fib. He has had no known episodes of a fib since his post op a fib in 2010. He admits that he was anxious at the time and his bp was high due to anxiety. He went to see his DIL, who is a nurse who calmed him and took his HR and BP which was improved. He has had no other episodes.     12/9/2024 presents for routine follow up. Patient reports 3 episodes of chest discomfort in the last 6 months that he describes a "deep soreness" of the left side of his chest. There is no triggering or relieving factors identified and it lasts for no more than one minute. He has had no chest discomfort in the last 2-3 weeks. He states he had a similar discomfort 2 years ago prior to his LHC which demonstrated patent but atrophied PERLA to the OM with no significant disease of the native OM (approx 50% distal Lcx lesion); patent LIMA to the LAD and patent Left radial artery graft the PDA. He also reports occasional palpitations with exertion described as heart racing.  Patient ran out of his thyroid hormone replacement therapy approx one month ago'        His EKG today demonstrated sinus tachycardia with ST changes in the inferior and anterolateral leads. The patient is not having active chest pain. He does not want LHC. I reviewed the EKG and case with Dr. Corea.      6/5/2024 presents for routine follow up. He is doing well and denies complaints.      12/5/2023  presents for routine follow up. He remains physically active and asymptomatic.      6/5/2023 presents for routine follow up. He remains physically active with out issues.   3/1/2023 presents for routine follow up. He states he is doing will but does have indigestion at times. He has no exertional symptoms."        * No surgery found *      Hospital Course:   No notes on file     Goals of " Care Treatment Preferences:  Code Status: Full Code      SDOH Screening:  The patient was screened for utility difficulties, food insecurity, transport difficulties, housing insecurity, and interpersonal safety and there were no concerns identified this admission.     Consults:   Consults (From admission, onward)          Status Ordering Provider     Inpatient consult to Cardiology  Once        Provider:  Stone Corea MD    Acknowledged SANTIAGO MCMANUS            Assessment & Plan  Atrial fibrillation with RVR  Patient has long standing persistent (>12 months) atrial fibrillation. Patient is currently in atrial fibrillation. [id number] Score: 3. The patients heart rate in the last 24 hours is as follows:  Pulse  Min: 56  Max: 137     Antiarrhythmics  metoprolol succinate (TOPROL-XL) 24 hr tablet 25 mg, Daily, Oral  metoprolol succinate (TOPROL-XL) 24 hr tablet, Daily, Oral    Anticoagulants  apixaban tablet 5 mg, 2 times daily, Oral  apixaban tablet, 2 times daily, Oral    Plan  - Replete lytes with a goal of K>4, Mg >2  - Patient is not anticoagulated due to unclear reasons.  I will add eliquis until further evaluation by cardiology.  - Patient's afib is currently uncontrolled. Will adjust treatment as follows: continue amiodarone gtt.    5/1: discontinue amiodarone and start eliquis and Toprol 25mg.  Continue amlodipine.        Nonrheumatic aortic valve stenosis  Echocardiogram with evidence of aortic stenosis that is  valve replaced  . The patient's most recent echocardiogram result is listed below. We will manage the valvular abnormality by continue current med    Echo  Result Date: 12/23/2024    Left Ventricle: The left ventricle is normal in size. Normal wall   thickness. There is normal systolic function with a visually estimated   ejection fraction of 60 - 65%. There is normal diastolic function.    Right Ventricle: Normal right ventricular cavity size. Systolic   function is normal.    Aortic Valve:  There is a bioprosthetic valve in the aortic position.    Mitral Valve: There is mild regurgitation.    Tricuspid Valve: There is mild regurgitation.    Pulmonic Valve: There is mild regurgitation.         Hx of aortic valve replacement      Hypertension  Patient's blood pressure range in the last 24 hours was: BP  Min: 113/83  Max: 160/91.The patient's inpatient anti-hypertensive regimen is listed below:  Current Antihypertensives  metoprolol succinate (TOPROL-XL) 24 hr tablet 25 mg, Daily, Oral  metoprolol succinate (TOPROL-XL) 24 hr tablet, Daily, Oral    Plan  - BP is controlled, no changes needed to their regimen    Hyperlipidemia  Continue home staff    Type 2 diabetes mellitus without complication, without long-term current use of insulin  Insulin sliding scale    Malignant melanoma, unspecified site  Continue outpatient care    Palpitations  Palpitation setting of AFib with RVR.    Hx of CABG  Continue outpatient management hospitalized and defer to Cardiology    Final Active Diagnoses:    Diagnosis Date Noted POA    PRINCIPAL PROBLEM:  Atrial fibrillation with RVR [I48.91] 04/30/2025 Yes    Hx of CABG [Z95.1] 04/30/2025 Not Applicable    Palpitations [R00.2] 12/09/2024 Yes    Type 2 diabetes mellitus without complication, without long-term current use of insulin [E11.9] 01/09/2024 Yes    Malignant melanoma, unspecified site [C43.9] 01/09/2024 Yes    Nonrheumatic aortic valve stenosis [I35.0] 03/30/2021 Yes    Hx of aortic valve replacement [Z95.2] 03/30/2021 Not Applicable    Hypertension [I10]  Yes    Hyperlipidemia [E78.5]  Yes      Problems Resolved During this Admission:    Diagnosis Date Noted Date Resolved POA    Paroxysmal SVT (supraventricular tachycardia) [I47.10] 04/09/2025 04/30/2025 Yes       Discharged Condition: fair    Disposition: Home or Self Care    Follow Up:   Follow-up Information       Dina Sebastian, DO Follow up in 1 week(s).    Specialty: Family Medicine  Why: hospital follow-up  for A fib with RVR.  Contact information:  [address]  [address]  [place] [address]  [phone]               Radha Donaldson, EDWINA Follow up on 5/7/2025.    Specialty: Cardiology  Why: Appointment scheduled with Cardiology on 05/07/2025 at 1:00 p.m.  Contact information:  [address] [facility]  [place] [address]  [phone]                           Patient Instructions:   No discharge procedures on file.    Significant Diagnostic Studies: Labs: BMP:   Recent Labs   Lab 04/30/25  1414 05/01/25  0433   * 106    140   K 4.2 4.3    106   CO2 24 27   BUN 15 14   CREATININE 0.96 0.84   CALCIUM 10.7* 10.5*   MG 2.1 2.1    and CBC   Recent Labs   Lab 04/30/25  1414 05/01/25  0433   WBC 10.42 9.22   HGB 17.2 16.2   HCT 50.3 47.4    185       Pending Diagnostic Studies:       None           Medications:  Reconciled Home Medications:      Medication List        START taking these medications      apixaban 5 mg Tab  Commonly known as: ELIQUIS  Take 1 tablet (5 mg total) by mouth 2 (two) times daily.     metoprolol succinate 25 MG 24 hr tablet  Commonly known as: TOPROL-XL  Take 1 tablet (25 mg total) by mouth once daily.  Start taking on: May 2, 2025            CONTINUE taking these medications      ALPRAZolam 0.25 MG tablet  Commonly known as: XANAX  Take 1 tablet (0.25 mg total) by mouth daily as needed for Anxiety.     amLODIPine 10 MG tablet  Commonly known as: NORVASC  Take 1 tablet (10 mg total) by mouth once daily.     aspirin 81 MG EC tablet  Commonly known as: ECOTRIN  Take 1 tablet (81 mg total) by mouth once daily.     atorvastatin 80 MG tablet  Commonly known as: LIPITOR  Take 1 tablet (80 mg total) by mouth once daily.     HYDROcodone-acetaminophen  mg per tablet  Commonly known as: NORCO  Take 1 tablet by mouth every 6 (six) hours as needed for Pain.     levothyroxine 75 MCG tablet  Commonly known as: SYNTHROID  Take 1 tablet (75 mcg  total) by mouth before breakfast.     lisinopriL 10 MG tablet  Take 1 tablet (10 mg total) by mouth 2 (two) times daily.     tadalafiL 20 MG Tab  Commonly known as: CIALIS  Take 1 tablet (20 mg total) by mouth every other day.            STOP taking these medications      amiodarone 200 MG Tab  Commonly known as: PACERONE              Indwelling Lines/Drains at time of discharge:   Lines/Drains/Airways       None                   Time spent on the discharge of patient: 45 minutes         Anny Crowley MD  Department of Hospital Medicine  [facility]

## 2025-05-01 NOTE — ASSESSMENT & PLAN NOTE
- patient seen and evaluated by Dr. Corea  - he is scheduled for loop recorder placement with Dr. Pham on 05/14/2025  - EKG AFib with RVR, discussed findings with patient in detail including increased risk for stroke and benefits of anticoagulation therapy with AFib.  He verbalized understanding and has agreed to start Eliquis.  - Dr. Corea recommends discontinuing long term amiodarone (due to history of hypothyroidism) and starting low dose BB Toprol 25 daily, patient is agreeable  - will have him follow up with PRABHA Donaldson in 1 week to further discuss if loop recorder is still needed at this point given newly documented afib with rvr and to discuss follow up with EP specialist for possible ablation if BB therapy does not control rate

## 2025-05-01 NOTE — SUBJECTIVE & OBJECTIVE
Past Medical History:   Diagnosis Date    Anemia     Arthritis     Atrial fibrillation     Cancer     Encounter for blood transfusion     GERD (gastroesophageal reflux disease)     Hyperlipidemia     Hypertension     Sleep apnea     Thyroid disease     Type 2 diabetes mellitus without complication, without long-term current use of insulin 1/9/2024       Past Surgical History:   Procedure Laterality Date    CARDIAC CATHETERIZATION      CARDIAC VALVE SURGERY      CORONARY ARTERY BYPASS GRAFT      LEFT HEART CATHETERIZATION Left 8/1/2022    Procedure: Left heart cath;  Surgeon: Tristan Pham MD;  Location: [facility] CATH LAB;  Service: Cardiology;  Laterality: Left;  NO LV GRAM. HE HAS A BIOPROSTHETIC AO VALVE.    SPINE SURGERY         Review of patient's allergies indicates:   Allergen Reactions    Levaquin [levofloxacin]     Toradol [ketorolac]     Wasp sting [allergen ext-venom-honey bee]     Wasp venom Itching and Swelling    Zyrtec [cetirizine]     Tramadol hcl Itching       No current facility-administered medications on file prior to encounter.     Current Outpatient Medications on File Prior to Encounter   Medication Sig    ALPRAZolam (XANAX) 0.25 MG tablet Take 1 tablet (0.25 mg total) by mouth daily as needed for Anxiety.    atorvastatin (LIPITOR) 80 MG tablet Take 1 tablet (80 mg total) by mouth once daily.    HYDROcodone-acetaminophen (NORCO)  mg per tablet Take 1 tablet by mouth every 6 (six) hours as needed for Pain.    [DISCONTINUED] amiodarone (PACERONE) 200 MG Tab Take 0.5 tablets (100 mg total) by mouth once daily.    [DISCONTINUED] amLODIPine (NORVASC) 10 MG tablet Take 1 tablet (10 mg total) by mouth once daily.    aspirin (ECOTRIN) 81 MG EC tablet Take 1 tablet (81 mg total) by mouth once daily.    levothyroxine (SYNTHROID) 75 MCG tablet Take 1 tablet (75 mcg total) by mouth before breakfast.    lisinopriL 10 MG tablet Take 1 tablet (10 mg total) by mouth 2 (two) times daily.     tadalafiL (CIALIS) 20 MG Tab Take 1 tablet (20 mg total) by mouth every other day.     Family History       Problem Relation (Age of Onset)    Heart attack Mother, Father    Heart disease Father          Tobacco Use    Smoking status: Former     Current packs/day: 0.00     Types: Cigarettes     Quit date: 2010     Years since quittin.9    Smokeless tobacco: Never    Tobacco comments:     quit 10+ years ago   Substance and Sexual Activity    Alcohol use: Never    Drug use: Never    Sexual activity: Not on file     Review of Systems   Constitutional: Negative for chills and fever.   HENT: Negative.     Eyes: Negative.    Cardiovascular:  Positive for palpitations. Negative for chest pain, dyspnea on exertion and leg swelling.   Respiratory:  Negative for shortness of breath.    Gastrointestinal: Negative.    Genitourinary: Negative.    Neurological: Negative.      Objective:     Vital Signs (Most Recent):  Temp: 97.6 °F (36.4 °C) (25 1152)  Pulse: (!) 57 (25 1152)  Resp: 16 (25 1152)  BP: (!) 160/91 (25 1152)  SpO2: 96 % (25 1259) Vital Signs (24h Range):  Temp:  [97.5 °F (36.4 °C)-98.2 °F (36.8 °C)] 97.6 °F (36.4 °C)  Pulse:  [56-72] 57  Resp:  [9-23] 16  SpO2:  [89 %-98 %] 96 %  BP: (127-160)/(80-94) 160/91     Weight: 104.3 kg (230 lb)  Body mass index is 31.19 kg/m².    SpO2: 96 %       No intake or output data in the 24 hours ending 25 1729    Lines/Drains/Airways       None                    Physical Exam  Vitals reviewed.   Constitutional:       General: He is not in acute distress.  HENT:      Nose: Nose normal.      Mouth/Throat:      Mouth: Mucous membranes are moist.      Pharynx: Oropharynx is clear.   Eyes:      General: No scleral icterus.     Pupils: Pupils are equal, round, and reactive to light.   Cardiovascular:      Rate and Rhythm: Normal rate and regular rhythm.   Pulmonary:      Effort: Pulmonary effort is normal.      Breath sounds: Normal breath  "sounds.   Abdominal:      General: Bowel sounds are normal.      Palpations: Abdomen is soft.   Musculoskeletal:      Right lower leg: No edema.      Left lower leg: No edema.   Skin:     General: Skin is warm and dry.   Neurological:      Mental Status: He is alert and oriented to person, place, and time.          Significant Labs: ABG: No results for input(s): "PH", "PCO2", "HCO3", "POCSATURATED", "BE" in the last 48 hours., Blood Culture: No results for input(s): "LABBLOO" in the last 48 hours., BMP:   Recent Labs   Lab 04/30/25  1414 05/01/25  0433   * 106    140   K 4.2 4.3    106   CO2 24 27   BUN 15 14   CREATININE 0.96 0.84   CALCIUM 10.7* 10.5*   MG 2.1 2.1   , CMP   Recent Labs   Lab 04/30/25  1414 05/01/25  0433    140   K 4.2 4.3    106   CO2 24 27   * 106   BUN 15 14   CREATININE 0.96 0.84   CALCIUM 10.7* 10.5*   PROT 7.6  --    ALBUMIN 4.2  --    BILITOT 1.8*  --    ALKPHOS 91  --    AST 33  --    ALT 26  --    ANIONGAP 15 11   , CBC   Recent Labs   Lab 04/30/25  1414 05/01/25  0433   WBC 10.42 9.22   HGB 17.2 16.2   HCT 50.3 47.4    185   , INR   Recent Labs   Lab 04/30/25  1414   INR 1.04   , Lipid Panel No results for input(s): "CHOL", "HDL", "LDLCALC", "TRIG", "CHOLHDL" in the last 48 hours., and Troponin No results for input(s): "TROPONINIHS" in the last 48 hours.    Significant Imaging: Cardiac Cath: Results for orders placed during the hospital encounter of 08/01/22    Cardiac catheterization    Conclusion  · The left ventricular systolic function was normal.  · The left ventricular end diastolic pressure was normal.  · The pre-procedure left ventricular end diastolic pressure was 20.  · The ejection fraction was calculated to be 60%.  · The left ventricular ejection fraction was grossly normal.  · There was no mitral valve regurgitation.  · The Dist Cx lesion was 50% stenosed.  · The Prox LAD lesion was 80% stenosed.  · The estimated blood loss was " none.  · There was three vessel coronary artery disease.  · Zenia to OM is atrophiied with no sig disease in the native om.    The procedure log was documented by Documenter: RT Lena and verified by Tristan Pham MD.    Date: 8/1/2022  Time: 12:24 PM  and     Echocardiogram: Transthoracic echo (TTE) complete (Cupid Only):   Results for orders placed or performed during the hospital encounter of 12/23/24   Echo   Result Value Ref Range    LVOT stroke volume 87.6 cm3    LVIDd 4.8 3.5 - 6.0 cm    LV Systolic Volume 17.10 mL    LVIDs 2.3 2.1 - 4.0 cm    LV ESV A2C 60.32 mL    LV Diastolic Volume 106.30 mL    LV ESV A4C 52.63 mL    Left Ventricular End Systolic Volume by Teichholz Method 17.10 mL    Left Ventricular End Diastolic Volume by Teichholz Method 106.30 mL    IVS 1.0 0.6 - 1.1 cm    LVOT diameter 2.0 cm    LVOT area 3.1 cm2    FS 52.1 (A) 28 - 44 %    Left Ventricle Relative Wall Thickness 0.38 cm    PW 0.9 0.6 - 1.1 cm    LV mass 158.8 g    MV Peak E Yariel 1.27 m/s    TDI LATERAL 0.11 m/s    TDI SEPTAL 0.09 m/s    E/E' ratio 12.70 m/s    MV Peak A Yariel 0.95 m/s    TR Max Yariel 2.51 m/s    E/A ratio 1.34     E wave deceleration time 267.05 msec    LV SEPTAL E/E' RATIO 14.11 m/s    LV LATERAL E/E' RATIO 11.55 m/s    LVOT peak yariel 1.2 m/s    Left Ventricular Outflow Tract Mean Velocity 0.81 cm/s    Left Ventricular Outflow Tract Mean Gradient 3.06 mmHg    TAPSE 2.12 cm    LA Vol (MOD) 58.83 mL    RA area length vol 33.16 mL    RA Area 15.3 cm2    RA Vol 33.05 mL    AV mean gradient 16.0 mmHg    AV peak gradient 29.2 mmHg    Ao peak yariel 2.7 m/s    Ao VTI 65.7 cm    LVOT peak VTI 27.9 cm    AV valve area 1.3 cm²    AV Velocity Ratio 0.44     AV index (prosthetic) 0.42     RADHA by Velocity Ratio 1.4 cm²    Mr max yariel 4.30 m/s    MV stenosis pressure 1/2 time 77.45 ms    MV valve area p 1/2 method 2.84 cm2    Triscuspid Valve Regurgitation Peak Gradient 25 mmHg    PV PEAK VELOCITY 1.03 m/s    PV peak gradient  4 mmHg    Ao root annulus 2.19 cm    IVC diameter 1.47 cm    Mean e' 0.10 m/s    LA area A4C 19.18 cm2    LA area A2C 19.25 cm2    AORTIC VALVE CUSP SEPERATION 1.76 cm    RV- keith basal diam 3.9 cm    RV-keith mid d 1.7 cm    RV Basal Diameter 6.66 cm    RV-keith length 6.7 cm    RV mid diameter 1.74 cm    RV/LV Ratio 0.81 cm    BSA 2.3 m2    LV Systolic Volume Index 7.6 mL/m2    LV Diastolic Volume Index 47.04 mL/m2    LV Mass Index 70.3 g/m2    LEV (MOD) 26.0 mL/m2    ZLVIDS -6.17     ZLVIDD -5.47     Narrative      Left Ventricle: The left ventricle is normal in size. Normal wall   thickness. There is normal systolic function with a visually estimated   ejection fraction of 60 - 65%. There is normal diastolic function.    Right Ventricle: Normal right ventricular cavity size. Systolic   function is normal.    Aortic Valve: There is a bioprosthetic valve in the aortic position.    Mitral Valve: There is mild regurgitation.    Tricuspid Valve: There is mild regurgitation.    Pulmonic Valve: There is mild regurgitation.

## 2025-05-01 NOTE — PLAN OF CARE
[facility]  Discharge Final Note    Primary Care Provider: Dina Sebastian DO    Expected Discharge Date: 5/8/2025    Final Discharge Note (most recent)       Final Note - 05/01/25 1517          Final Note    Assessment Type Final Discharge Note     Anticipated Discharge Disposition Home or Self Care     What phone number can be called within the next 1-3 days to see how you are doing after discharge? [phone]        Post-Acute Status    Coverage medicare/     Discharge Delays None known at this time                     Important Message from Medicare  Important Message from Medicare regarding Discharge Appeal Rights: Given to patient/caregiver, Explained to patient/caregiver, Signed/date by patient/caregiver     Date IMM was signed: 05/01/25  Time IMM was signed: 0919    Contact Info       Dina Sebastian DO   Specialty: Family Medicine   Relationship: PCP - General    [address] [facility] [address]  [facility] [address]   Phone: [phone]       Next Steps: Follow up in 1 week(s)    Instructions: hospital follow-up for A fib with RVR.  Patient  said he will call  tomorrow to schedule appointment    Radha Donaldson ACNP   Specialty: Cardiology    [address] [place] [address] [facility]  [facility] [address]   Phone: [phone]       Next Steps: Follow up on 5/7/2025    Instructions: Appointment scheduled with Cardiology on 05/07/2025 at 1:00 p.m.            Pt discharged home self care. No DME required. No further needs noted.

## 2025-05-01 NOTE — ASSESSMENT & PLAN NOTE
[facility] 2/2025    Basic rhythm is sinus, average heart rate 65 beats per minute.    Occasional PACs, couplets and triplets.  70 runs SVT, longest 13.1 seconds (130 beats per minute.).    Rare PVCs and couplets.  One 6 beat run VT, max heart rate 203 beats per minute.    Two patient triggered events during NSR (65-96), PACs and PVCs.

## 2025-05-01 NOTE — ASSESSMENT & PLAN NOTE
Patient's blood pressure range in the last 24 hours was: BP  Min: 113/83  Max: 160/91.The patient's inpatient anti-hypertensive regimen is listed below:  Current Antihypertensives  metoprolol succinate (TOPROL-XL) 24 hr tablet 25 mg, Daily, Oral  metoprolol succinate (TOPROL-XL) 24 hr tablet, Daily, Oral    Plan  - BP is controlled, no changes needed to their regimen

## 2025-05-01 NOTE — PLAN OF CARE
[facility]  Initial Discharge Assessment       Primary Care Provider: Dina Sebastian DO    Admission Diagnosis: Palpitations [R00.2]  Hyperbilirubinemia [E80.6]  Chest pain [R07.9]  Atrial fibrillation with RVR [I48.91]    Admission Date: 4/30/2025  Expected Discharge Date: 5/8/2025    Transition of Care Barriers: None    Payor: MEDICARE / Plan: MEDICARE PART A & B / Product Type: Government /     Extended Emergency Contact Information  Primary Emergency Contact: NORA MAN  Mobile Phone: [phone]  Relation: Spouse  Preferred language: English   needed? No    Discharge Plan A: Home  Discharge Plan B: Home with family, Home Health, Long-term acute care facility (LTAC), Rehab, Skilled Nursing Facility      [id number] - [place], [place] - [address] AT [facility] [address]  & [facility]  [address] [place] [address]  Phone: [phone] Fax: [phone]      Initial Assessment (most recent)       Adult Discharge Assessment - 05/01/25 1016          Discharge Assessment    Assessment Type Discharge Planning Assessment     Source of Information patient     People in Home spouse     Do you expect to return to your current living situation? Yes     Do you have help at home or someone to help you manage your care at home? Yes     Who are your caregiver(s) and their phone number(s)? Nora Man, wife, [phone]     Prior to hospitilization cognitive status: Unable to Assess     Current cognitive status: Alert/Oriented     Walking or Climbing Stairs Difficulty no     Dressing/Bathing Difficulty no     Home Accessibility wheelchair accessible     Home Layout Able to live on 1st floor     Equipment Currently Used at Home none     Readmission within 30 days? No     Patient currently being followed by outpatient case management? No     Do you currently have service(s) that help you manage your care at home? No     Do you take  prescription medications? Yes     Do you have prescription coverage? Yes     Coverage medicare/     Do you have any problems affording any of your prescribed medications? No     Is the patient taking medications as prescribed? yes     Who is going to help you get home at discharge? Nora Man, wife, [phone]     How do you get to doctors appointments? car, drives self     Are you on dialysis? No     Discharge Plan A Home     Discharge Plan B Home with family;Home Health;Long-term acute care facility (LTAC);Rehab;Skilled Nursing Facility     DME Needed Upon Discharge  none     Discharge Plan discussed with: Patient     Transition of Care Barriers None        OTHER    Name(s) of People in Home Nora Man, wife, [phone]                      CM spoke with pt at bedside.  Pt lives at home with Nora Man, wife, [phone], and plans on returning when medically ready for discharge. Pt not current with HH. DME not required. IM obtained. SDOH current. CM following.

## 2025-05-01 NOTE — ASSESSMENT & PLAN NOTE
Patient has long standing persistent (>12 months) atrial fibrillation. Patient is currently in atrial fibrillation. [id number] Score: 3. The patients heart rate in the last 24 hours is as follows:  Pulse  Min: 56  Max: 137     Antiarrhythmics  metoprolol succinate (TOPROL-XL) 24 hr tablet 25 mg, Daily, Oral  metoprolol succinate (TOPROL-XL) 24 hr tablet, Daily, Oral    Anticoagulants  apixaban tablet 5 mg, 2 times daily, Oral  apixaban tablet, 2 times daily, Oral    Plan  - Replete lytes with a goal of K>4, Mg >2  - Patient is not anticoagulated due to unclear reasons.  I will add eliquis until further evaluation by cardiology.  - Patient's afib is currently uncontrolled. Will adjust treatment as follows: continue amiodarone gtt.    5/1: discontinue amiodarone and start eliquis and Toprol 25mg.  Continue amlodipine.

## 2025-05-02 ENCOUNTER — TELEPHONE (OUTPATIENT)
Dept: CARDIOLOGY | Facility: CLINIC | Age: 70
End: 2025-05-02
Payer: MEDICARE

## 2025-05-02 NOTE — PROGRESS NOTES
[facility]    Patient Name:  Shreyas Man   MRN:  [id number]  Date: 2025  Diagnosis: Atrial fibrillation with RVR    History:     Past Medical History:   Diagnosis Date    Anemia     Arthritis     Atrial fibrillation     Cancer     Encounter for blood transfusion     GERD (gastroesophageal reflux disease)     Hyperlipidemia     Hypertension     Sleep apnea     Thyroid disease     Type 2 diabetes mellitus without complication, without long-term current use of insulin 2024       Social History[1]    Precautions:     Allergies as of 2025 - Reviewed 2025   Allergen Reaction Noted    Levaquin [levofloxacin]  2021    Toradol [ketorolac]  10/04/2021    Wasp sting [allergen ext-venom-honey bee]  2021    Wasp venom Itching and Swelling 2017    Zyrtec [cetirizine]  2021    Tramadol hcl Itching 2017       [facility] Assessment Details/Treatment     Narrative:  Seen patient for initiation of preventative skin care measures    Patient in bed, Dressed. Nursing staff in room. States has no skin issues.   Jorge score 23    D/c home today        2025         [1]   Social History  Socioeconomic History    Marital status:    Tobacco Use    Smoking status: Former     Current packs/day: 0.00     Types: Cigarettes     Quit date: 2010     Years since quittin.9    Smokeless tobacco: Never    Tobacco comments:     quit 10+ years ago   Substance and Sexual Activity    Alcohol use: Never    Drug use: Never     Social Drivers of Health     Financial Resource Strain: Low Risk  (2025)    Overall Financial Resource Strain (CARDIA)     Difficulty of Paying Living Expenses: Not hard at all   Food Insecurity: No Food Insecurity (2025)    Hunger Vital Sign     Worried About Running Out of Food in the Last Year: Never true     Ran Out of Food in the Last Year: Never true   Transportation Needs: No Transportation Needs (2025)     PRAPARE - Transportation     Lack of Transportation (Medical): No     Lack of Transportation (Non-Medical): No   Physical Activity: Sufficiently Active (3/25/2025)    Received from [facility]    Exercise Vital Sign     Days of Exercise per Week: 5 days     Minutes of Exercise per Session: 30 min   Stress: No Stress Concern Present (4/30/2025)    Citizen of [place] [employer] - Occupational Stress Questionnaire     Feeling of Stress : Not at all   Housing Stability: Low Risk  (4/30/2025)    Housing Stability Vital Sign     Unable to Pay for Housing in the Last Year: No     Homeless in the Last Year: No

## 2025-05-02 NOTE — TELEPHONE ENCOUNTER
----- Message from ELIGIO Siu sent at 5/1/2025  6:33 PM CDT -----  Regarding: FW: Changed Medication    ----- Message -----  From: Chantel Devine  Sent: 5/1/2025   4:13 PM CDT  To: [facility] Staff  Subject: Changed Medication                               Who Called: Shreyas Doshi called to let y'all know that he has been discharged from the hospital and is home now, but they changed his medicine. Patient's Preferred Phone Number on File: [phone]

## 2025-05-07 ENCOUNTER — OFFICE VISIT (OUTPATIENT)
Dept: CARDIOLOGY | Facility: CLINIC | Age: 70
End: 2025-05-07
Payer: MEDICARE

## 2025-05-07 VITALS
SYSTOLIC BLOOD PRESSURE: 134 MMHG | BODY MASS INDEX: 31.04 KG/M2 | OXYGEN SATURATION: 95 % | HEART RATE: 59 BPM | HEIGHT: 72 IN | DIASTOLIC BLOOD PRESSURE: 82 MMHG | WEIGHT: 229.19 LBS

## 2025-05-07 DIAGNOSIS — E78.5 HYPERLIPIDEMIA, UNSPECIFIED HYPERLIPIDEMIA TYPE: ICD-10-CM

## 2025-05-07 DIAGNOSIS — Z95.1 HX OF CABG: ICD-10-CM

## 2025-05-07 DIAGNOSIS — Z95.2 HX OF AORTIC VALVE REPLACEMENT: ICD-10-CM

## 2025-05-07 DIAGNOSIS — I35.0 NONRHEUMATIC AORTIC VALVE STENOSIS: ICD-10-CM

## 2025-05-07 DIAGNOSIS — I47.10 PAROXYSMAL SVT (SUPRAVENTRICULAR TACHYCARDIA): ICD-10-CM

## 2025-05-07 DIAGNOSIS — I48.91 ATRIAL FIBRILLATION, UNSPECIFIED TYPE: Primary | ICD-10-CM

## 2025-05-07 DIAGNOSIS — I10 HYPERTENSION, UNSPECIFIED TYPE: ICD-10-CM

## 2025-05-07 DIAGNOSIS — I25.118 ATHEROSCLEROTIC HEART DISEASE OF NATIVE CORONARY ARTERY WITH OTHER FORMS OF ANGINA PECTORIS: ICD-10-CM

## 2025-05-07 DIAGNOSIS — G47.30 SLEEP APNEA, UNSPECIFIED TYPE: ICD-10-CM

## 2025-05-07 PROCEDURE — 99215 OFFICE O/P EST HI 40 MIN: CPT | Mod: PBBFAC,25 | Performed by: NURSE PRACTITIONER

## 2025-05-07 PROCEDURE — 99999 PR PBB SHADOW E&M-EST. PATIENT-LVL V: CPT | Mod: PBBFAC,,, | Performed by: NURSE PRACTITIONER

## 2025-05-07 PROCEDURE — 93010 ELECTROCARDIOGRAM REPORT: CPT | Mod: S$PBB,,, | Performed by: INTERNAL MEDICINE

## 2025-05-07 PROCEDURE — 99214 OFFICE O/P EST MOD 30 MIN: CPT | Mod: S$PBB,,, | Performed by: NURSE PRACTITIONER

## 2025-05-07 PROCEDURE — 93005 ELECTROCARDIOGRAM TRACING: CPT | Mod: PBBFAC | Performed by: INTERNAL MEDICINE

## 2025-05-07 NOTE — ASSESSMENT & PLAN NOTE
"Lab Results   Component Value Date    CHOL 144 12/09/2024    CHOL 133 06/06/2024    CHOL 178 10/02/2023      Lab Results   Component Value Date    HDL 44 12/09/2024    HDL 54 06/06/2024    HDL 57 10/02/2023     Lab Results   Component Value Date    LDLCALC 76 12/09/2024    LDLCALC 64 06/06/2024    LDLCALC 95 10/02/2023      Lab Results   Component Value Date    TRIG 122 12/09/2024    TRIG 77 06/06/2024    TRIG 130 10/02/2023     No results found for: "TOTALCHOLEST"  Lab Results   Component Value Date    NONHDLCHOL 100 12/09/2024    NONHDLCHOL 79 06/06/2024    NONHDLCHOL 121 10/02/2023     Lab Results   Component Value Date    CHOLHDL 3.3 12/09/2024    CHOLHDL 2.5 06/06/2024    CHOLHDL 3.1 10/02/2023     Lipitor 80 mg po daily  Lipids followed by PCP  "

## 2025-05-07 NOTE — PROGRESS NOTES
PCP: Dina Sebastian DO    Referring Provider:   Chief Complaint   Patient presents with    Atrial Fibrillation    Coronary Artery Disease    Hospital Follow Up     Patient denies any cardiac complaints today.       Subjective:   Shreyas Man is a 70 y.o. male with hx of AS s/p AVR with a bioprosthetic valve, h/o post op a fib in 2010 without recurrence on amiodarone to maintain RSR, CAD s/p CABG, HTN, and HLD,  who presents for HD follow up from admission 4/30/2025-5/1/2025 for sudden onset a fib with RVR with ventricular rates >150 bpm. Patient was discharged home on Eliquis 5 mg po bid and metoprolol succinate 25 mg po daily. Rhythm strip from 5/1/2025 at 06:41 demonstrated conversion to sinus bradycardia with HR 54 bpm.   Today's EKG demonstrates sinus bradycardia with HR 54 bpm. The patient had been on chronic low dose amiodarone since his post op a fib but had no known episodes since. He was scheduled to have a Loop recorder implanted but this will not be necessary now that we have a confirmed diagnosis of  afib. His amiodarone was stopped on hospital admission.     I d/w the patient today referral to EP to be considered for a fib ablation. He does not fall and has had no issues with bleeding so at present does not seem to be a candidate for a Watchman Implant.   Refer to Dr. Brito in [place], [place].    4/9/2025 presents to discuss referral to EP. The Zio monitor demonstrated 70 runs SVT, longest 13.1 seconds (130 beats per minute.). I recommended that he see an EP and he was to call me back when he decided to go or not. In the meantime he was seen by his PCP, who referred him to Dr. Judd at [facility]. The patient reports that he was not happy with the interaction with Dr. Judd and does not want to see him again. I have reviewed the notes from that OV. Dr. Judd has recommended a Loop implant and the patient would like Dr. Pham to do that here. The patient would like to see Dr. Brito in  "[place], [place] if needed pending information from the Loop.     2/5/2025 present for follow up to discuss the results of his Lexiscan/echo and reassess symptoms.  Patient had "flutters" after last appointment. He is concerned may have been a fib. He has had no known episodes of a fib since his post op a fib in 2010. He admits that he was anxious at the time and his bp was high due to anxiety. He went to see his DIL, who is a nurse who calmed him and took his HR and BP which was improved. He has had no other episodes.    12/9/2024 presents for routine follow up. Patient reports 3 episodes of chest discomfort in the last 6 months that he describes a "deep soreness" of the left side of his chest. There is no triggering or relieving factors identified and it lasts for no more than one minute. He has had no chest discomfort in the last 2-3 weeks. He states he had a similar discomfort 2 years ago prior to his LHC which demonstrated patent but atrophied PERLA to the OM with no significant disease of the native OM (approx 50% distal Lcx lesion); patent LIMA to the LAD and patent Left radial artery graft the PDA. He also reports occasional palpitations with exertion described as heart racing.  Patient ran out of his thyroid hormone replacement therapy approx one month ago'      His EKG today demonstrated sinus tachycardia with ST changes in the inferior and anterolateral leads. The patient is not having active chest pain. He does not want LHC. I reviewed the EKG and case with Dr. Corea.     6/5/2024 presents for routine follow up. He is doing well and denies complaints.     12/5/2023  presents for routine follow up. He remains physically active and asymptomatic.     6/5/2023 presents for routine follow up. He remains physically active with out issues.   3/1/2023 presents for routine follow up. He states he is doing will but does have indigestion at times. He has no exertional symptoms.         Fhx:  Family History   Problem " Relation Name Age of Onset    Heart attack Mother      Heart attack Father      Heart disease Father       Shx:   Social History     Socioeconomic History    Marital status:    Tobacco Use    Smoking status: Former     Current packs/day: 0.00     Types: Cigarettes     Quit date: 2010     Years since quittin.9    Smokeless tobacco: Never    Tobacco comments:     quit 10+ years ago   Substance and Sexual Activity    Alcohol use: Never    Drug use: Never     Social Drivers of Health     Financial Resource Strain: Low Risk  (2025)    Overall Financial Resource Strain (CARDIA)     Difficulty of Paying Living Expenses: Not hard at all   Food Insecurity: No Food Insecurity (2025)    Hunger Vital Sign     Worried About Running Out of Food in the Last Year: Never true     Ran Out of Food in the Last Year: Never true   Transportation Needs: No Transportation Needs (2025)    PRAPARE - Transportation     Lack of Transportation (Medical): No     Lack of Transportation (Non-Medical): No   Physical Activity: Sufficiently Active (3/25/2025)    Received from [facility]    Exercise Vital Sign     Days of Exercise per Week: 5 days     Minutes of Exercise per Session: 30 min   Stress: No Stress Concern Present (2025)    [employer] - Occupational Stress Questionnaire     Feeling of Stress : Not at all   Housing Stability: Low Risk  (2025)    Housing Stability Vital Sign     Unable to Pay for Housing in the Last Year: No     Homeless in the Last Year: No       EKG  2025 sinus bradycardia; HR 54 bpm; NSTWA  2025 a fib with RVR;; ventricular rate of 151 bpm; possible LVH; widespread ST-TWA  2025 RSR with HR 83 bpm; NS ST&TWA; compared with EKG 2024 inferior and anterolateral ST changes have resolved.  2024 sinus tachycardia;  bpm; LVH with ST changes in the inferior and anterolateral leads (reviewed with   Anmol)  6/5/2024 RSR with sinus arrhythmia; HR 76 bpm; NS ST&TWA; when compared with EKG 12/5/2023 no significant change was found.  Results for orders placed or performed in visit on 02/05/25   EKG 12-lead    Collection Time: 02/05/25 10:35 AM   Result Value Ref Range    QRS Duration 80 ms    OHS QTC Calculation 430 ms    Narrative    Test Reason : I48.91,    Vent. Rate :  83 BPM     Atrial Rate :  83 BPM     P-R Int : 174 ms          QRS Dur :  80 ms      QT Int : 366 ms       P-R-T Axes :  72  71  75 degrees    QTcB Int : 430 ms    Normal sinus rhythm  Nonspecific ST and T wave abnormality  Abnormal ECG  When compared with ECG of 09-Dec-2024 13:22,  T wave inversion no longer evident in Inferior leads  T wave inversion no longer evident in Lateral leads  Confirmed by Tristan Pham ([id number]) on 2/6/2025 8:50:29 AM    Referred By:            Confirmed By: Tristan Pham     Zio monitor worn from 2/5/2025-2/18/2025  Interpretation Summary  Show Result Comparison       Basic rhythm is sinus, average heart rate 65 beats per minute.    Occasional PACs, couplets and triplets.  70 runs SVT, longest 13.1 seconds (130 beats per minute.).    Rare PVCs and couplets.  One 6 beat run VT, max heart rate 203 beats per minute.    Two patient triggered events during NSR (65-96), PACs and PVCs.     Patient had a min HR of 39 bpm, max HR of 203 bpm, and avg HR of 65 bpm. Predominant underlying rhythm was Sinus Rhythm. 1 run of Ventricular Tachycardia occurred lasting 6 beats with a max rate of 203 bpm (avg 196 bpm). 70 Supraventricular Tachycardia runs occurred, the run with the fastest interval lasting 11.4 secs with a max rate of 174 bpm, the longest lasting 13.1 secs with an avg rate of 130 bpm. Isolated SVEs were occasional (1.2%, [id number]), SVE Couplets were rare (<1.0%, 1561), and SVE Triplets were rare (<1.0%, 165). Isolated VEs were rare (<1.0%, 349), VE Couplets were rare (<1.0%, 15), and no VE Triplets were present.          Lexiscan 12/23/2024   Impression:     1. Scintigraphically negative for ischemia, findings favor old infarct or artifact but do not definitively rule out balanced ischemia.  Correlate clinically.  Consider invasive ischemic evaluation if e.g. symptoms discordant with nuclear stress findings.  2. the global left ventricular systolic function is normal with an LV ejection fraction of 70 % and no evidence of LV dilatation. Wall motion (hypokinetic septal segments) appears to correlate with the aforementioned segmental perfusion defects, however could also be explained by LBBB, RV pace, any cause of electromechanical dyssychrony        Electronically signed by: Tj Stevenson  Date:                                            12/23/2024  Time:                                           14:35      Results for orders placed during the hospital encounter of 12/23/24    Echo    Interpretation Summary    Left Ventricle: The left ventricle is normal in size. Normal wall thickness. There is normal systolic function with a visually estimated ejection fraction of 60 - 65%. There is normal diastolic function.    Right Ventricle: Normal right ventricular cavity size. Systolic function is normal.    Aortic Valve: There is a bioprosthetic valve in the aortic position.    Mitral Valve: There is mild regurgitation.    Tricuspid Valve: There is mild regurgitation.    Pulmonic Valve: There is mild regurgitation.         Results for orders placed during the hospital encounter of 12/26/23    Echo    Interpretation Summary    Left Ventricle: The left ventricle is normal in size. Increased wall thickness. Septal thickening. Normal wall motion. There is hyperdynamic systolic function with a visually estimated ejection fraction of 70 - 75%. There is normal diastolic function.    Right Ventricle: Normal right ventricular cavity size. Systolic function is borderline low.    Aortic Valve: There is a porcine prosthetic valve in the aortic  position.    Mitral Valve: There is no stenosis.    Tricuspid Valve: There is moderate regurgitation.    Pulmonic Valve: There is mild regurgitation.    IVC/SVC: Normal venous pressure at 3 mmHg.    ECHO Results for orders placed during the hospital encounter of 02/20/23    Echo Saline Bubble? No    Interpretation Summary  · The left ventricle is normal in size with mild concentric hypertrophy and normal systolic function.  · The estimated ejection fraction is 65%.  · Left ventricular diastolic dysfunction.  · Normal right ventricular size.  · There is a bioprosthetic aortic valve present.  · The aortic valve mean gradient is 21 mmHg with a dimensionless index of 0.39.  · Mild mitral regurgitation.  · Mild tricuspid regurgitation.  · Mild pulmonic regurgitation.  · Mild left atrial enlargement.    [facility] Results for orders placed during the hospital encounter of 08/01/22    Cardiac catheterization    Conclusion  · The left ventricular systolic function was normal.  · The left ventricular end diastolic pressure was normal.  · The pre-procedure left ventricular end diastolic pressure was 20.  · The ejection fraction was calculated to be 60%.  · The left ventricular ejection fraction was grossly normal.  · There was no mitral valve regurgitation.  · The Dist Cx lesion was 50% stenosed.  · The Prox LAD lesion was 80% stenosed.  · The estimated blood loss was none.  · There was three vessel coronary artery disease.  · Zenia to OM is atrophiied with no sig disease in the native om.    The procedure log was documented by Documenter: RT Lena and verified by Tristan Pham MD.    Date: 8/1/2022  Time: 12:24 PM        Lab Results   Component Value Date     05/01/2025    K 4.3 05/01/2025     05/01/2025    CO2 27 05/01/2025    BUN 14 05/01/2025    CREATININE 0.84 05/01/2025    CALCIUM 10.5 (H) 05/01/2025    ANIONGAP 11 05/01/2025    EGFRNONAA 100 07/28/2022       Lab Results   Component Value Date    CHOL  144 12/09/2024    CHOL 133 06/06/2024    CHOL 178 10/02/2023     Lab Results   Component Value Date    HDL 44 12/09/2024    HDL 54 06/06/2024    HDL 57 10/02/2023     Lab Results   Component Value Date    LDLCALC 76 12/09/2024    LDLCALC 64 06/06/2024    LDLCALC 95 10/02/2023     Lab Results   Component Value Date    TRIG 122 12/09/2024    TRIG 77 06/06/2024    TRIG 130 10/02/2023     Lab Results   Component Value Date    CHOLHDL 3.3 12/09/2024    CHOLHDL 2.5 06/06/2024    CHOLHDL 3.1 10/02/2023       Lab Results   Component Value Date    WBC 9.22 05/01/2025    HGB 16.2 05/01/2025    HCT 47.4 05/01/2025    MCV 92.9 05/01/2025     05/01/2025           Current Outpatient Medications:     ALPRAZolam (XANAX) 0.25 MG tablet, Take 1 tablet (0.25 mg total) by mouth daily as needed for Anxiety., Disp: 90 tablet, Rfl: 3    amLODIPine (NORVASC) 10 MG tablet, Take 1 tablet (10 mg total) by mouth once daily., Disp: , Rfl:     apixaban (ELIQUIS) 5 mg Tab, Take 1 tablet (5 mg total) by mouth 2 (two) times daily., Disp: 60 tablet, Rfl: 0    aspirin (ECOTRIN) 81 MG EC tablet, Take 1 tablet (81 mg total) by mouth once daily., Disp: 90 tablet, Rfl: 3    atorvastatin (LIPITOR) 80 MG tablet, Take 1 tablet (80 mg total) by mouth once daily., Disp: 90 tablet, Rfl: 3    HYDROcodone-acetaminophen (NORCO)  mg per tablet, Take 1 tablet by mouth every 6 (six) hours as needed for Pain., Disp: 120 tablet, Rfl: 0    levothyroxine (SYNTHROID) 75 MCG tablet, Take 1 tablet (75 mcg total) by mouth before breakfast., Disp: 90 tablet, Rfl: 3    lisinopriL 10 MG tablet, Take 1 tablet (10 mg total) by mouth 2 (two) times daily., Disp: 180 tablet, Rfl: 3    metoprolol succinate (TOPROL-XL) 25 MG 24 hr tablet, Take 1 tablet (25 mg total) by mouth once daily., Disp: 30 tablet, Rfl: 1    tadalafiL (CIALIS) 20 MG Tab, Take 1 tablet (20 mg total) by mouth every other day., Disp: 10 tablet, Rfl: 11  Meds reviewed but not reconciled. He does not  bring his meds in for reconciliation.      Review of Systems   Respiratory:  Negative for shortness of breath.    Cardiovascular:  Negative for chest pain, palpitations, orthopnea, claudication, leg swelling and PND.   Gastrointestinal:  Negative for heartburn.   Neurological:  Negative for dizziness, loss of consciousness and weakness.           Objective:   /82 (BP Location: Left arm, Patient Position: Sitting)   Pulse (!) 59   Ht 6' (1.829 m)   Wt 104 kg (229 lb 3.2 oz)   SpO2 95%   BMI 31.09 kg/m²     Physical Exam  Vitals and nursing note reviewed.   Constitutional:       Appearance: Normal appearance. He is obese.   HENT:      Head: Atraumatic.   Neck:      Vascular: No carotid bruit.   Cardiovascular:      Rate and Rhythm: Normal rate and regular rhythm.      Pulses: Normal pulses.      Heart sounds: Normal heart sounds.   Pulmonary:      Effort: Pulmonary effort is normal.      Breath sounds: Normal breath sounds.   Abdominal:      Palpations: Abdomen is soft.   Musculoskeletal:      Cervical back: Neck supple.      Right lower leg: No edema.      Left lower leg: No edema.   Skin:     General: Skin is warm and dry.      Capillary Refill: Capillary refill takes less than 2 seconds.   Neurological:      Mental Status: He is alert.           Assessment:     1. Atrial fibrillation, unspecified type  EKG 12-lead    Ambulatory referral/consult to Electrophysiology    EKG 12-lead      2. Atherosclerotic heart disease of native coronary artery with other forms of angina pectoris        3. Paroxysmal SVT (supraventricular tachycardia)        4. Nonrheumatic aortic valve stenosis        5. Hypertension, unspecified type        6. Hyperlipidemia, unspecified hyperlipidemia type        7. Sleep apnea, unspecified type        8. Hx of CABG        9. Hx of aortic valve replacement              Plan:   Atrial fibrillation  Patient presents for HD follow up from admission 4/30/2025-5/1/2025 for sudden onset a fib  with RVR with ventricular rates >150 bpm. Patient was discharged home on Eliquis 5 mg po bid and metoprolol succinate 25 mg po daily. Today's EKG demonstrates sinus bradycardia with HR 54 bpm. The patient had been on chronic low dose amiodarone since his post op a fib but had no known episodes since. He was scheduled to have a Loop recorder implanted but this will not be necessary now that we have a confirmed diagnosis of  afib. His amiodarone was stopped on hospital admission.     I d/w the patient today referral to EP to be considered for a fib ablation. He does not fall and has had no issues with bleeding so at present does not seem to be a candidate for a Watchman Implant.   Refer to Dr. Brito in [place], [place].    Atherosclerotic heart disease of native coronary artery with other forms of angina pectoris  S/p CABG 8/5/2010- Dr. Krystal AGARWAL to LAD  And AGARWAL to PERLA Y graft with distal anastomosis to the diag  Left radial artery to the PDA    Lexiscan 12/23/2024   Impression:     1. Scintigraphically negative for ischemia, findings favor old infarct or artifact but do not definitively rule out balanced ischemia.  Correlate clinically.  Consider invasive ischemic evaluation if e.g. symptoms discordant with nuclear stress findings.  2. the global left ventricular systolic function is normal with an LV ejection fraction of 70 % and no evidence of LV dilatation. Wall motion (hypokinetic septal segments) appears to correlate with the aforementioned segmental perfusion defects, however could also be explained by LBBB, RV pace, any cause of electromechanical dyssychrony        Electronically signed by: Tj Stevenson  Date:                                            12/23/2024  Time:                                           14:35    No chest pain or SOB  Continue ASA/Lipitor    Paroxysmal SVT (supraventricular tachycardia)  Zio monitor worn from 2/5/2025-2/18/2025  Interpretation Summary  Show Result Comparison       Basic  "rhythm is sinus, average heart rate 65 beats per minute.    Occasional PACs, couplets and triplets.  70 runs SVT, longest 13.1 seconds (130 beats per minute.).    Rare PVCs and couplets.  One 6 beat run VT, max heart rate 203 beats per minute.    Two patient triggered events during NSR (65-96), PACs and PVCs.     Patient had a min HR of 39 bpm, max HR of 203 bpm, and avg HR of 65 bpm. Predominant underlying rhythm was Sinus Rhythm. 1 run of Ventricular Tachycardia occurred lasting 6 beats with a max rate of 203 bpm (avg 196 bpm). 70 Supraventricular Tachycardia runs occurred, the run with the fastest interval lasting 11.4 secs with a max rate of 174 bpm, the longest lasting 13.1 secs with an avg rate of 130 bpm. Isolated SVEs were occasional (1.2%, 40028), SVE Couplets were rare (<1.0%, 1561), and SVE Triplets were rare (<1.0%, 165). Isolated VEs were rare (<1.0%, 349), VE Couplets were rare (<1.0%, 15), and no VE Triplets were present.     Nonrheumatic aortic valve stenosis  8/5/2010 Dr. Krystal Sorto Epic 25 mm in the aortic position (bioprosthetic)  Serial number [id number]          Hypertension  134/82 mmHg    Hyperlipidemia  Lab Results   Component Value Date    CHOL 144 12/09/2024    CHOL 133 06/06/2024    CHOL 178 10/02/2023      Lab Results   Component Value Date    HDL 44 12/09/2024    HDL 54 06/06/2024    HDL 57 10/02/2023     Lab Results   Component Value Date    LDLCALC 76 12/09/2024    LDLCALC 64 06/06/2024    LDLCALC 95 10/02/2023      Lab Results   Component Value Date    TRIG 122 12/09/2024    TRIG 77 06/06/2024    TRIG 130 10/02/2023     No results found for: "TOTALCHOLEST"  Lab Results   Component Value Date    NONHDLCHOL 100 12/09/2024    NONHDLCHOL 79 06/06/2024    NONHDLCHOL 121 10/02/2023     Lab Results   Component Value Date    CHOLHDL 3.3 12/09/2024    CHOLHDL 2.5 06/06/2024    CHOLHDL 3.1 10/02/2023     Lipitor 80 mg po daily  Lipids followed by PCP    Sleep apnea  Intolerant of CPAP  I have " recommended that he d/w his PCP the Inspire Implant and possible referral to ENT. The untreated DANIA could be a contributor if not the etiology of the paroxysmal a fib and should be addressed.     Hx of CABG  S/p CABG 8/5/2010- Dr. Krystal AGARWAL to LAD  And STEFANO to PERLA Y graft with distal anastomosis to the diag  Left radial artery to the PDA      Hx of aortic valve replacement  8/5/2010 Dr. Rice  [facility] Epic 25 mm (bioprosthetic)  Serial number [id number]              RTC: 3 months

## 2025-05-07 NOTE — ASSESSMENT & PLAN NOTE
Zio monitor worn from 2/5/2025-2/18/2025  Interpretation Summary  Show Result Comparison       Basic rhythm is sinus, average heart rate 65 beats per minute.    Occasional PACs, couplets and triplets.  70 runs SVT, longest 13.1 seconds (130 beats per minute.).    Rare PVCs and couplets.  One 6 beat run VT, max heart rate 203 beats per minute.    Two patient triggered events during NSR (65-96), PACs and PVCs.     Patient had a min HR of 39 bpm, max HR of 203 bpm, and avg HR of 65 bpm. Predominant underlying rhythm was Sinus Rhythm. 1 run of Ventricular Tachycardia occurred lasting 6 beats with a max rate of 203 bpm (avg 196 bpm). 70 Supraventricular Tachycardia runs occurred, the run with the fastest interval lasting 11.4 secs with a max rate of 174 bpm, the longest lasting 13.1 secs with an avg rate of 130 bpm. Isolated SVEs were occasional (1.2%, 97237), SVE Couplets were rare (<1.0%, 1561), and SVE Triplets were rare (<1.0%, 165). Isolated VEs were rare (<1.0%, 349), VE Couplets were rare (<1.0%, 15), and no VE Triplets were present.

## 2025-05-07 NOTE — ASSESSMENT & PLAN NOTE
S/p CABG 8/5/2010- Dr. Krystal AGARWAL to LAD  And STEFANO to PERLA Y graft with distal anastomosis to the diag  Left radial artery to the PDA

## 2025-05-07 NOTE — ASSESSMENT & PLAN NOTE
Intolerant of CPAP  I have recommended that he d/w his PCP the [employer] Implant and possible referral to ENT. The untreated DANIA could be a contributor if not the etiology of the paroxysmal a fib and should be addressed.

## 2025-05-07 NOTE — ASSESSMENT & PLAN NOTE
S/p CABG 8/5/2010- Dr. Krystal AGARWAL to LAD  And AGARWAL to PERLA Y graft with distal anastomosis to the diag  Left radial artery to the PDA    Lexiscan 12/23/2024   Impression:     1. Scintigraphically negative for ischemia, findings favor old infarct or artifact but do not definitively rule out balanced ischemia.  Correlate clinically.  Consider invasive ischemic evaluation if e.g. symptoms discordant with nuclear stress findings.  2. the global left ventricular systolic function is normal with an LV ejection fraction of 70 % and no evidence of LV dilatation. Wall motion (hypokinetic septal segments) appears to correlate with the aforementioned segmental perfusion defects, however could also be explained by LBBB, RV pace, any cause of electromechanical dyssychrony        Electronically signed by: Tj Stevenson  Date:                                            12/23/2024  Time:                                           14:35    No chest pain or SOB  Continue ASA/Lipitor

## 2025-05-07 NOTE — ASSESSMENT & PLAN NOTE
Patient presents for HD follow up from admission 4/30/2025-5/1/2025 for sudden onset a fib with RVR with ventricular rates >150 bpm. Patient was discharged home on Eliquis 5 mg po bid and metoprolol succinate 25 mg po daily. Today's EKG demonstrates sinus bradycardia with HR 54 bpm. The patient had been on chronic low dose amiodarone since his post op a fib but had no known episodes since. He was scheduled to have a Loop recorder implanted but this will not be necessary now that we have a confirmed diagnosis of  afib. His amiodarone was stopped on hospital admission.     I d/w the patient today referral to EP to be considered for a fib ablation. He does not fall and has had no issues with bleeding so at present does not seem to be a candidate for a Watchman Implant.       Refer to Dr. Brito in [place], [place].  Continue Eliquis 5 mg po bid and metoprolol 25 mg po daily

## 2025-05-07 NOTE — ASSESSMENT & PLAN NOTE
8/5/2010 Dr. Krystal Sorto Epic 25 mm in the aortic position (bioprosthetic)  Serial number [id number]

## 2025-05-08 LAB
OHS QRS DURATION: 86 MS
OHS QTC CALCULATION: 419 MS

## 2025-05-15 NOTE — TELEPHONE ENCOUNTER
Copied from CRM #[id number]. Topic: General Inquiry - Patient Advice  >> May 14, 2025 12:26 PM Elyssa wrote:  Who Called: Shreyas Man      Who Left Message for Patient:  Does the patient know what this is regarding?:YES      Preferred Method of Contact: Phone Call  Patient's Preferred Phone Number on File: [phone]   Best Call Back Number, if different:  Additional Information: NEEDS TO SPEAK WITH THE NURSE ABOUT HAVING HIS NEW PRESCRIPTIONS BE SET UP BY MAIL ORDER

## 2025-05-16 RX ORDER — METOPROLOL SUCCINATE 25 MG/1
25 TABLET, EXTENDED RELEASE ORAL DAILY
Qty: 90 TABLET | Refills: 1 | Status: SHIPPED | OUTPATIENT
Start: 2025-05-16 | End: 2025-07-15

## 2025-05-21 PROCEDURE — 88305 TISSUE EXAM BY PATHOLOGIST: CPT | Mod: 26,,, | Performed by: PATHOLOGY

## 2025-05-21 PROCEDURE — 88305 TISSUE EXAM BY PATHOLOGIST: CPT | Mod: TC,SUR | Performed by: DERMATOLOGY

## 2025-05-22 ENCOUNTER — LAB REQUISITION (OUTPATIENT)
Dept: LAB | Facility: HOSPITAL | Age: 70
End: 2025-05-22
Attending: DERMATOLOGY
Payer: MEDICARE

## 2025-05-22 DIAGNOSIS — D49.2 NEOPLASM OF UNSPECIFIED BEHAVIOR OF BONE, SOFT TISSUE, AND SKIN: ICD-10-CM

## 2025-06-12 ENCOUNTER — TELEPHONE (OUTPATIENT)
Dept: CARDIOLOGY | Facility: CLINIC | Age: 70
End: 2025-06-12
Payer: MEDICARE

## 2025-06-12 NOTE — TELEPHONE ENCOUNTER
CRM # [id number]  Owner: None  Status: Unresolved  Open  Priority: Routine Created on: 06/12/2025 04:36 PM By: Mariana Kwok     Primary Information    Source   Shreyas Man (Patient)    Subject   Shreyas Man (Patient)    Topic   General Inquiry - Return Call      Summary   Return Call   Communication   Who Called: Shreyas Man            Does the patient know what this is regarding?:pt had a missed call and he was calling back to talk to nurse                  Preferred Method of Contact: Phone Call      Patient's Preferred Phone Number on File: [phone]

## 2025-06-12 NOTE — TELEPHONE ENCOUNTER
Copied from CRM #[id number]. Topic: General Inquiry - Patient Advice  >> Jack 10, 2025  2:04 PM Corinne wrote:  Who Called: Shreyas Westbrook Magda    Patient states that he has completed ablation at home recovering. Would like a call back about procedure.     Preferred Method of Contact: Phone Call  Patient's Preferred Phone Number on File: [phone]   Best Call Back Number, if different:  Additional Information:

## 2025-07-08 ENCOUNTER — OFFICE VISIT (OUTPATIENT)
Dept: CARDIOLOGY | Facility: CLINIC | Age: 70
End: 2025-07-08
Payer: MEDICARE

## 2025-07-08 ENCOUNTER — TELEPHONE (OUTPATIENT)
Dept: CARDIOLOGY | Facility: CLINIC | Age: 70
End: 2025-07-08
Payer: MEDICARE

## 2025-07-08 VITALS
HEART RATE: 72 BPM | DIASTOLIC BLOOD PRESSURE: 108 MMHG | WEIGHT: 226.81 LBS | OXYGEN SATURATION: 98 % | HEIGHT: 72 IN | BODY MASS INDEX: 30.72 KG/M2 | SYSTOLIC BLOOD PRESSURE: 180 MMHG

## 2025-07-08 DIAGNOSIS — I10 HYPERTENSION, UNSPECIFIED TYPE: ICD-10-CM

## 2025-07-08 DIAGNOSIS — Z95.2 HX OF AORTIC VALVE REPLACEMENT: ICD-10-CM

## 2025-07-08 DIAGNOSIS — I48.4 ATYPICAL ATRIAL FLUTTER: ICD-10-CM

## 2025-07-08 DIAGNOSIS — I25.118 ATHEROSCLEROTIC HEART DISEASE OF NATIVE CORONARY ARTERY WITH OTHER FORMS OF ANGINA PECTORIS: ICD-10-CM

## 2025-07-08 DIAGNOSIS — I48.91 ATRIAL FIBRILLATION, UNSPECIFIED TYPE: Primary | ICD-10-CM

## 2025-07-08 DIAGNOSIS — Z79.899 HIGH RISK MEDICATION USE: ICD-10-CM

## 2025-07-08 DIAGNOSIS — E78.5 HYPERLIPIDEMIA, UNSPECIFIED HYPERLIPIDEMIA TYPE: ICD-10-CM

## 2025-07-08 DIAGNOSIS — Z95.1 HX OF CABG: ICD-10-CM

## 2025-07-08 LAB
OHS QRS DURATION: 84 MS
OHS QTC CALCULATION: 438 MS

## 2025-07-08 PROCEDURE — 99999 PR PBB SHADOW E&M-EST. PATIENT-LVL IV: CPT | Mod: PBBFAC,,, | Performed by: NURSE PRACTITIONER

## 2025-07-08 PROCEDURE — 99214 OFFICE O/P EST MOD 30 MIN: CPT | Mod: S$PBB,,, | Performed by: NURSE PRACTITIONER

## 2025-07-08 PROCEDURE — 93005 ELECTROCARDIOGRAM TRACING: CPT | Mod: PBBFAC | Performed by: INTERNAL MEDICINE

## 2025-07-08 PROCEDURE — 93010 ELECTROCARDIOGRAM REPORT: CPT | Mod: S$PBB,,, | Performed by: INTERNAL MEDICINE

## 2025-07-08 PROCEDURE — 99214 OFFICE O/P EST MOD 30 MIN: CPT | Mod: PBBFAC,25 | Performed by: NURSE PRACTITIONER

## 2025-07-08 RX ORDER — LISINOPRIL 40 MG/1
40 TABLET ORAL DAILY
Qty: 90 TABLET | Refills: 3 | Status: SHIPPED | OUTPATIENT
Start: 2025-07-08 | End: 2026-07-08

## 2025-07-08 NOTE — TELEPHONE ENCOUNTER
Copied from CRM #[id number]. Topic: Medications - Medication Question  >> Jul 8, 2025  1:14 PM Altagraica wrote:  Patient requesting a call back would like to know if he should stop taking metoprolol?

## 2025-07-09 PROBLEM — I48.3 TYPICAL ATRIAL FLUTTER: Status: ACTIVE | Noted: 2025-07-09

## 2025-07-09 PROBLEM — I48.4 ATYPICAL ATRIAL FLUTTER: Status: ACTIVE | Noted: 2025-07-09

## 2025-07-09 NOTE — ASSESSMENT & PLAN NOTE
190/110 mmHg  Rechecked 180/108 mmHg    Patient is anxious reporting the events of his recent ablation. BP 5/21/2025 at Dr. Brito's office 136/72 mmHg    Patient was instructed by Dr. Brito to increase his lisinopril to 40 mg po daily but he has not. He has agreed to increase the dose.    Bp check and bmp in 2 weeks

## 2025-07-09 NOTE — ASSESSMENT & PLAN NOTE
6/9/2025 Dr. 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  Eliquis has been continued.  Started on metoprolol succinate 25 mg po daily- continue    EKG today RSR with HR 72 bpm; normal EKG

## 2025-07-09 NOTE — ASSESSMENT & PLAN NOTE
6/9/2025- Dr. 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

## 2025-07-09 NOTE — PROGRESS NOTES
PCP: Dina Sebastian DO    Referring Provider:   Chief Complaint   Patient presents with    Follow-up     Pt denies any cardiac complaints today.        Subjective:   Shreyas Man is a 70 y.o. male with hx of AS s/p AVR with a bioprosthetic valve, h/o post op a fib in 2010 without recurrence on amiodarone to maintain RSR, CAD s/p CABG, HTN, and HLD,  who presents for HD follow up.  History of Present Illness    HPI:  Patient, a 70-year-old male, underwent a cardiac ablation 3 weeks ago for AF and AFL. A second procedure was necessary due to equipment failure during the initial attempt.    Post-ablation, he reported significant chest pain and right groin area for 2 weeks, which was severe, particularly when standing up or taking deep breaths.    He reports consistently elevated BP since the procedure, associated with headaches. He notes increased difficulty with physical activities, particularly walking uphill.    He expresses concern about his current medications, especially the continued use of Eliquis. He reports taking Xanax PRN for anxiety symptoms. He discloses impotence since his CABG.    He played 18 holes of golf yesterday in hot weather. He abstained from all activities for 2 weeks post-procedure, then gradually resumed activities like golf, starting with 9 holes.    He denies current chest pain, dyspnea, or palpitations. He denies a history of MI.          5/7/2025 presents for HD follow up from admission 4/30/2025-5/1/2025 for sudden onset a fib with RVR with ventricular rates >150 bpm. Patient was discharged home on Eliquis 5 mg po bid and metoprolol succinate 25 mg po daily. Rhythm strip from 5/1/2025 at 06:41 demonstrated conversion to sinus bradycardia with HR 54 bpm.   Today's EKG demonstrates sinus bradycardia with HR 54 bpm. The patient had been on chronic low dose amiodarone since his post op a fib but had no known episodes since. He was scheduled to have a Loop recorder implanted but this  Problem: Mobility Impaired (Adult and Pediatric) Goal: *Acute Goals and Plan of Care (Insert Text) Description: FUNCTIONAL STATUS PRIOR TO ADMISSION: Patient was modified independent using a single point cane PRN for functional mobility. HOME SUPPORT PRIOR TO ADMISSION: The patient lived with her two grandsons (both work) but did not require assist. Also has supportive daughter in law and son. Physical Therapy Goals Initiated 3/11/2020 1. Patient will move from supine to sit and sit to supine , scoot up and down and roll side to side in bed with modified independence within 7 day(s). 2.  Patient will transfer from bed to chair and chair to bed with modified independence using the least restrictive device within 7 day(s). 3.  Patient will perform sit to stand with modified independence within 7 day(s). 4.  Patient will ambulate with modified independence for 150 feet with the least restrictive device within 7 day(s). 5.  Patient will ascend/descend 4 stairs with right handrail(s) with contact guard assist within 7 day(s). 6.  Patient will improve Tinetti score by 4-5 points within 7 days. Outcome: Progressing Towards Goal 
 
PHYSICAL THERAPY TREATMENT Patient: Tito Clay (33 y.o. female) Date: 3/12/2020 Diagnosis: Acute respiratory failure ([facility] [address] [J96.00] Acute respiratory failure ([facility] [address] Precautions: Fall Chart, physical therapy assessment, plan of care and goals were reviewed. ASSESSMENT Patient continues with skilled PT services and is progressing towards goals. Pt was able to increase gait tolerance. Pt required UE support for balance. Pt has A.D. at home for discharge. Pt was able to perform self care with set up. Pt is hopeful to discharge home with family support today. Current Level of Function Impacting Discharge (mobility/balance): CGA Other factors to consider for discharge: decrease activity tolerance and strength.   
    
 
PLAN : 
 "will not be necessary now that we have a confirmed diagnosis of  afib. His amiodarone was stopped on hospital admission.     I d/w the patient today referral to EP to be considered for a fib ablation. He does not fall and has had no issues with bleeding so at present does not seem to be a candidate for a Watchman Implant.   Refer to Dr. Brito in [place].    4/9/2025 presents to discuss referral to EP. The Zio monitor demonstrated 70 runs SVT, longest 13.1 seconds (130 beats per minute.). I recommended that he see an EP and he was to call me back when he decided to go or not. In the meantime he was seen by his PCP, who referred him to Dr. Judd at [facility] in [place]. The patient reports that he was not happy with the interaction with Dr. Judd and does not want to see him again. I have reviewed the notes from that OV. Dr. Judd has recommended a Loop implant and the patient would like Dr. Pham to do that here. The patient would like to see Dr. Brito in [place], [place] if needed pending information from the Loop.     2/5/2025 present for follow up to discuss the results of his Lexiscan/echo and reassess symptoms.  Patient had "flutters" after last appointment. He is concerned may have been a fib. He has had no known episodes of a fib since his post op a fib in 2010. He admits that he was anxious at the time and his bp was high due to anxiety. He went to see his DIL, who is a nurse who calmed him and took his HR and BP which was improved. He has had no other episodes.    12/9/2024 presents for routine follow up. Patient reports 3 episodes of chest discomfort in the last 6 months that he describes a "deep soreness" of the left side of his chest. There is no triggering or relieving factors identified and it lasts for no more than one minute. He has had no chest discomfort in the last 2-3 weeks. He states he had a similar discomfort 2 years ago prior to his LHC which demonstrated patent but atrophied PERLA to " Patient continues to benefit from skilled intervention to address the above impairments. Continue treatment per established plan of care. to address goals. Recommendation for discharge: (in order for the patient to meet his/her long term goals) Physical therapy at least 2 days/week in the home AND ensure assist and/or supervision for safety with functional mobility as needed This discharge recommendation: 
Has not yet been discussed the attending provider and/or case management IF patient discharges home will need the following DME: patient owns DME required for discharge SUBJECTIVE:  
Patient stated I need to use the commode first. OBJECTIVE DATA SUMMARY:  
Critical Behavior: 
Neurologic State: Alert Orientation Level: Oriented X4 Cognition: Appropriate for age attention/concentration Safety/Judgement: Decreased insight into deficits Functional Mobility Training: 
Bed Mobility: 
  
  
  
  
  
  
Transfers: 
Sit to Stand: Stand-by assistance Stand to Sit: Stand-by assistance Bed to Chair: Contact guard assistance chair to [facility] Balance: 
Sitting: Intact Standing: Impaired Standing - Static: Fair Standing - Dynamic : Fair Ambulation/Gait Training: 
Distance (ft): 80 Feet (ft) Assistive Device: Gait belt(IV pole) Ambulation - Level of Assistance: Contact guard assistance Gait Abnormalities: Decreased step clearance Base of Support: Widened Speed/Jacki: Pace decreased (<100 feet/min) Step Length: Left shortened;Right shortened Stairs: Therapeutic Exercises:  
 
Pain Rating: 
Did not report Activity Tolerance:  
Limited Please refer to the flowsheet for vital signs taken during this treatment. After treatment patient left in no apparent distress:  
Sitting in chair and Call bell within reach COMMUNICATION/COLLABORATION:  
The patients plan of care was discussed with: . Kera Pagan PTA 
 Time Calculation: 24 mins the OM with no significant disease of the native OM (approx 50% distal Lcx lesion); patent LIMA to the LAD and patent Left radial artery graft the PDA. He also reports occasional palpitations with exertion described as heart racing.  Patient ran out of his thyroid hormone replacement therapy approx one month ago'      His EKG today demonstrated sinus tachycardia with ST changes in the inferior and anterolateral leads. The patient is not having active chest pain. He does not want LHC. I reviewed the EKG and case with Dr. Corea.     6/5/2024 presents for routine follow up. He is doing well and denies complaints.     12/5/2023  presents for routine follow up. He remains physically active and asymptomatic.     6/5/2023 presents for routine follow up. He remains physically active with out issues.   3/1/2023 presents for routine follow up. He states he is doing will but does have indigestion at times. He has no exertional symptoms.         Fhx:  Family History   Problem Relation Name Age of Onset    Heart attack Mother      Heart attack Father      Heart disease Father       Shx:   Social History     Socioeconomic History    Marital status:    Tobacco Use    Smoking status: Former     Current packs/day: 0.00     Types: Cigarettes     Quit date: 5/31/2010     Years since quitting: 15.1    Smokeless tobacco: Never    Tobacco comments:     quit 10+ years ago   Substance and Sexual Activity    Alcohol use: Never    Drug use: Never     Social Drivers of Health     Financial Resource Strain: Low Risk  (4/30/2025)    Overall Financial Resource Strain (CARDIA)     Difficulty of Paying Living Expenses: Not hard at all   Food Insecurity: No Food Insecurity (4/30/2025)    Hunger Vital Sign     Worried About Running Out of Food in the Last Year: Never true     Ran Out of Food in the Last Year: Never true   Transportation Needs: No Transportation Needs (4/30/2025)    PRAPARE - Transportation     Lack of Transportation (Medical):  No     Lack of Transportation (Non-Medical): No   Physical Activity: Sufficiently Active (3/25/2025)    Received from [facility]    Exercise Vital Sign     Days of Exercise per Week: 5 days     Minutes of Exercise per Session: 30 min   Stress: No Stress Concern Present (4/30/2025)    [employer] [place] [employer] - Occupational Stress Questionnaire     Feeling of Stress : Not at all   Housing Stability: Low Risk  (4/30/2025)    Housing Stability Vital Sign     Unable to Pay for Housing in the Last Year: No     Homeless in the Last Year: No       EKG  7/8/2025 RSR with HR 72 bpm; normal EKG  5/7/2025 sinus bradycardia; HR 54 bpm; NSTWA  4/30/2025 a fib with RVR;; ventricular rate of 151 bpm; possible LVH; widespread ST-TWA  2/5/2025 RSR with HR 83 bpm; NS ST&TWA; compared with EKG 12/9/2024 inferior and anterolateral ST changes have resolved.  12/9/2024 sinus tachycardia;  bpm; LVH with ST changes in the inferior and anterolateral leads (reviewed with Dr. Corea)  6/5/2024 RSR with sinus arrhythmia; HR 76 bpm; NS ST&TWA; when compared with EKG 12/5/2023 no significant change was found.  Results for orders placed or performed in visit on 07/08/25   EKG 12-lead    Collection Time: 07/08/25 10:08 AM   Result Value Ref Range    QRS Duration 84 ms    OHS QTC Calculation 438 ms    Narrative    Test Reason : I48.91,    Vent. Rate :  72 BPM     Atrial Rate :  72 BPM     P-R Int : 144 ms          QRS Dur :  84 ms      QT Int : 400 ms       P-R-T Axes :  73  40  33 degrees    QTcB Int : 438 ms    Normal sinus rhythm  Normal ECG  When compared with ECG of 07-May-2025 13:22,  No significant change was found  Confirmed by Tristan Pham ([id number]) on 7/8/2025 4:54:54 PM    Referred By:            Confirmed By: Tristan Pham     Zio monitor worn from 2/5/2025-2/18/2025  Interpretation Summary  Show Result Comparison       Basic rhythm is sinus, average heart rate 65 beats per  minute.    Occasional PACs, couplets and triplets.  70 runs SVT, longest 13.1 seconds (130 beats per minute.).    Rare PVCs and couplets.  One 6 beat run VT, max heart rate 203 beats per minute.    Two patient triggered events during NSR (65-96), PACs and PVCs.     Patient had a min HR of 39 bpm, max HR of 203 bpm, and avg HR of 65 bpm. Predominant underlying rhythm was Sinus Rhythm. 1 run of Ventricular Tachycardia occurred lasting 6 beats with a max rate of 203 bpm (avg 196 bpm). 70 Supraventricular Tachycardia runs occurred, the run with the fastest interval lasting 11.4 secs with a max rate of 174 bpm, the longest lasting 13.1 secs with an avg rate of 130 bpm. Isolated SVEs were occasional (1.2%, 36353), SVE Couplets were rare (<1.0%, 1561), and SVE Triplets were rare (<1.0%, 165). Isolated VEs were rare (<1.0%, 349), VE Couplets were rare (<1.0%, 15), and no VE Triplets were present.         [employer] 12/23/2024   Impression:     1. Scintigraphically negative for ischemia, findings favor old infarct or artifact but do not definitively rule out balanced ischemia.  Correlate clinically.  Consider invasive ischemic evaluation if e.g. symptoms discordant with nuclear stress findings.  2. the global left ventricular systolic function is normal with an LV ejection fraction of 70 % and no evidence of LV dilatation. Wall motion (hypokinetic septal segments) appears to correlate with the aforementioned segmental perfusion defects, however could also be explained by LBBB, RV pace, any cause of electromechanical dyssychrony        Electronically signed by: Tj Stevenson  Date:                                            12/23/2024  Time:                                           14:35      Results for orders placed during the hospital encounter of 12/23/24    Echo    Interpretation Summary    Left Ventricle: The left ventricle is normal in size. Normal wall thickness. There is normal systolic function with a visually  estimated ejection fraction of 60 - 65%. There is normal diastolic function.    Right Ventricle: Normal right ventricular cavity size. Systolic function is normal.    Aortic Valve: There is a bioprosthetic valve in the aortic position.    Mitral Valve: There is mild regurgitation.    Tricuspid Valve: There is mild regurgitation.    Pulmonic Valve: There is mild regurgitation.         Results for orders placed during the hospital encounter of 12/26/23    Echo    Interpretation Summary    Left Ventricle: The left ventricle is normal in size. Increased wall thickness. Septal thickening. Normal wall motion. There is hyperdynamic systolic function with a visually estimated ejection fraction of 70 - 75%. There is normal diastolic function.    Right Ventricle: Normal right ventricular cavity size. Systolic function is borderline low.    Aortic Valve: There is a porcine prosthetic valve in the aortic position.    Mitral Valve: There is no stenosis.    Tricuspid Valve: There is moderate regurgitation.    Pulmonic Valve: There is mild regurgitation.    IVC/SVC: Normal venous pressure at 3 mmHg.    ECHO Results for orders placed during the hospital encounter of 02/20/23    Echo Saline Bubble? No    Interpretation Summary  · The left ventricle is normal in size with mild concentric hypertrophy and normal systolic function.  · The estimated ejection fraction is 65%.  · Left ventricular diastolic dysfunction.  · Normal right ventricular size.  · There is a bioprosthetic aortic valve present.  · The aortic valve mean gradient is 21 mmHg with a dimensionless index of 0.39.  · Mild mitral regurgitation.  · Mild tricuspid regurgitation.  · Mild pulmonic regurgitation.  · Mild left atrial enlargement.    [facility] Results for orders placed during the hospital encounter of 08/01/22    Cardiac catheterization    Conclusion  · The left ventricular systolic function was normal.  · The left ventricular end diastolic pressure was normal.  · The  pre-procedure left ventricular end diastolic pressure was 20.  · The ejection fraction was calculated to be 60%.  · The left ventricular ejection fraction was grossly normal.  · There was no mitral valve regurgitation.  · The Dist Cx lesion was 50% stenosed.  · The Prox LAD lesion was 80% stenosed.  · The estimated blood loss was none.  · There was three vessel coronary artery disease.  · Zenia to OM is atrophiied with no sig disease in the native om.    The procedure log was documented by Documenter: RT Lena and verified by Tristan Pham MD.    Date: 8/1/2022  Time: 12:24 PM    6/9/2025 Dr. Brito  Ablation of a fib by pulmonary isolation  Ablation of typical right atrial isthmus dependent flutter  Ablation of non-pulmonary venous triggers of atrial fibrillation  Ablation of atypical left atrial flutter    Lab Results   Component Value Date     05/01/2025    K 4.3 05/01/2025     05/01/2025    CO2 27 05/01/2025    BUN 14 05/01/2025    CREATININE 0.84 05/01/2025    CALCIUM 10.5 (H) 05/01/2025    ANIONGAP 11 05/01/2025    EGFRNONAA 100 07/28/2022       Lab Results   Component Value Date    CHOL 144 12/09/2024    CHOL 133 06/06/2024    CHOL 178 10/02/2023     Lab Results   Component Value Date    HDL 44 12/09/2024    HDL 54 06/06/2024    HDL 57 10/02/2023     Lab Results   Component Value Date    LDLCALC 76 12/09/2024    LDLCALC 64 06/06/2024    LDLCALC 95 10/02/2023     Lab Results   Component Value Date    TRIG 122 12/09/2024    TRIG 77 06/06/2024    TRIG 130 10/02/2023     Lab Results   Component Value Date    CHOLHDL 3.3 12/09/2024    CHOLHDL 2.5 06/06/2024    CHOLHDL 3.1 10/02/2023       Lab Results   Component Value Date    WBC 9.22 05/01/2025    HGB 16.2 05/01/2025    HCT 47.4 05/01/2025    MCV 92.9 05/01/2025     05/01/2025           Current Outpatient Medications:     ALPRAZolam (XANAX) 0.25 MG tablet, Take 1 tablet (0.25 mg total) by mouth daily as needed for Anxiety., Disp:  90 tablet, Rfl: 3    amLODIPine (NORVASC) 10 MG tablet, Take 1 tablet (10 mg total) by mouth once daily., Disp: , Rfl:     apixaban (ELIQUIS) 5 mg Tab, Take 1 tablet (5 mg total) by mouth 2 (two) times daily., Disp: 180 tablet, Rfl: 3    aspirin (ECOTRIN) 81 MG EC tablet, Take 1 tablet (81 mg total) by mouth once daily., Disp: 90 tablet, Rfl: 3    atorvastatin (LIPITOR) 80 MG tablet, Take 1 tablet (80 mg total) by mouth once daily., Disp: 90 tablet, Rfl: 3    HYDROcodone-acetaminophen (NORCO)  mg per tablet, Take 1 tablet by mouth every 6 (six) hours as needed for Pain., Disp: 120 tablet, Rfl: 0    levothyroxine (SYNTHROID) 75 MCG tablet, Take 1 tablet (75 mcg total) by mouth before breakfast., Disp: 90 tablet, Rfl: 3    lisinopriL (PRINIVIL,ZESTRIL) 40 MG tablet, Take 1 tablet (40 mg total) by mouth once daily., Disp: 90 tablet, Rfl: 3    tadalafiL (CIALIS) 20 MG Tab, Take 1 tablet (20 mg total) by mouth every other day., Disp: 10 tablet, Rfl: 11  Meds reviewed but not reconciled. He does not bring his meds in for reconciliation.      Review of Systems   Respiratory:  Negative for shortness of breath.    Cardiovascular:  Negative for chest pain, palpitations, orthopnea, claudication, leg swelling and PND.   Gastrointestinal:  Negative for heartburn.   Neurological:  Negative for dizziness, loss of consciousness and weakness.           Objective:   BP (!) 180/108 (BP Location: Left arm, Patient Position: Sitting)   Pulse 72   Ht 6' (1.829 m)   Wt 102.9 kg (226 lb 12.8 oz)   SpO2 98%   BMI 30.76 kg/m²     Physical Exam  Vitals and nursing note reviewed.   Constitutional:       Appearance: Normal appearance. He is obese.   HENT:      Head: Atraumatic.   Neck:      Vascular: No carotid bruit.   Cardiovascular:      Rate and Rhythm: Normal rate and regular rhythm.      Pulses: Normal pulses.      Heart sounds: Normal heart sounds.   Pulmonary:      Effort: Pulmonary effort is normal.      Breath sounds:  Normal breath sounds.   Abdominal:      Palpations: Abdomen is soft.   Musculoskeletal:      Cervical back: Neck supple.      Right lower leg: No edema.      Left lower leg: No edema.   Skin:     General: Skin is warm and dry.      Capillary Refill: Capillary refill takes less than 2 seconds.   Neurological:      Mental Status: He is alert.           Assessment:     1. Atrial fibrillation, unspecified type  EKG 12-lead    EKG 12-lead      2. Hypertension, unspecified type  lisinopriL (PRINIVIL,ZESTRIL) 40 MG tablet      3. High risk medication use  Basic Metabolic Panel      4. Atypical atrial flutter        5. Hyperlipidemia, unspecified hyperlipidemia type        6. Atherosclerotic heart disease of native coronary artery with other forms of angina pectoris        7. Hx of CABG        8. Hx of aortic valve replacement              Plan:   Atrial fibrillation  6/9/2025 Dr. 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  Eliquis has been continued.  Started on metoprolol succinate 25 mg po daily- continue    EKG today RSR with HR 72 bpm; normal EKG    Atypical atrial flutter  6/9/2025- Dr. Brito  Ablation of typical right atrial isthmus dependent flutter  Ablation of atypical left atrial flutter    Hypertension  190/110 mmHg  Rechecked 180/108 mmHg    Patient is anxious reporting the events of his recent ablation. BP 5/21/2025 at Dr. Brito's office 136/72 mmHg    Patient was instructed by Dr. Brito to increase his lisinopril to 40 mg po daily but he has not. He has agreed to increase the dose.    Bp check and bmp in 2 weeks    Hyperlipidemia  Lab Results   Component Value Date    CHOL 144 12/09/2024    CHOL 133 06/06/2024    CHOL 178 10/02/2023      Lab Results   Component Value Date    HDL 44 12/09/2024    HDL 54 06/06/2024    HDL 57 10/02/2023     Lab Results   Component Value Date    LDLCALC 76 12/09/2024    LDLCALC 64  "06/06/2024    LDLCALC 95 10/02/2023      Lab Results   Component Value Date    TRIG 122 12/09/2024    TRIG 77 06/06/2024    TRIG 130 10/02/2023     No results found for: "TOTALCHOLEST"  Lab Results   Component Value Date    NONHDLCHOL 100 12/09/2024    NONHDLCHOL 79 06/06/2024    NONHDLCHOL 121 10/02/2023     Lab Results   Component Value Date    CHOLHDL 3.3 12/09/2024    CHOLHDL 2.5 06/06/2024    CHOLHDL 3.1 10/02/2023     Lipitor 80 mg po daily  Lipids followed by PCP    Atherosclerotic heart disease of native coronary artery with other forms of angina pectoris  S/p CABG 8/5/2010- Dr. Krystal LOERAA to LAD  And AGARWAL to PERLA Y graft with distal anastomosis to the diag  Left radial artery to the PDA     Lexiscan 12/23/2024   Impression:     1. Scintigraphically negative for ischemia, findings favor old infarct or artifact but do not definitively rule out balanced ischemia.  Correlate clinically.  Consider invasive ischemic evaluation if e.g. symptoms discordant with nuclear stress findings.  2. the global left ventricular systolic function is normal with an LV ejection fraction of 70 % and no evidence of LV dilatation. Wall motion (hypokinetic septal segments) appears to correlate with the aforementioned segmental perfusion defects, however could also be explained by LBBB, RV pace, any cause of electromechanical dyssychrony        Electronically signed by: Tj Stevenson  Date:                                            12/23/2024  Time:                                           14:35     No chest pain or SOB  Continue ASA/Lipitor       Hx of CABG  S/p CABG 8/5/2010- Dr. Krystal AGARWAL to LAD  And AGARWAL to PERLA Y graft with distal anastomosis to the diag  Left radial artery to the PDA     Lexiscan 12/23/2024   Impression:     1. Scintigraphically negative for ischemia, findings favor old infarct or artifact but do not definitively rule out balanced ischemia.  Correlate clinically.  Consider invasive ischemic evaluation if " e.g. symptoms discordant with nuclear stress findings.  2. the global left ventricular systolic function is normal with an LV ejection fraction of 70 % and no evidence of LV dilatation. Wall motion (hypokinetic septal segments) appears to correlate with the aforementioned segmental perfusion defects, however could also be explained by LBBB, RV pace, any cause of electromechanical dyssychrony        Electronically signed by: Tj Stevenson  Date:                                            12/23/2024  Time:                                           14:35     No chest pain or SOB  Continue ASA/Lipitor       Hx of aortic valve replacement  8/5/2010 Dr. Rice  [facility] 25 mm   Serial number [id number]                RTC: as scheduled on 8/5/2025  Bp check in 2 weeks with BMP            This note was generated with the assistance of ambient listening technology. Verbal consent was obtained by the patient and accompanying visitor(s) for the recording of patient appointment to facilitate this note. I attest to having reviewed and edited the generated note for accuracy, though some syntax or spelling errors may persist. Please contact the author of this note for any clarification.

## 2025-07-28 ENCOUNTER — CLINICAL SUPPORT (OUTPATIENT)
Dept: CARDIOLOGY | Facility: CLINIC | Age: 70
End: 2025-07-28
Payer: MEDICARE

## 2025-07-28 DIAGNOSIS — I10 HYPERTENSION, UNSPECIFIED TYPE: Primary | ICD-10-CM

## 2025-07-28 PROCEDURE — 99211 OFF/OP EST MAY X REQ PHY/QHP: CPT | Mod: PBBFAC

## 2025-07-28 PROCEDURE — 99999 PR PBB SHADOW E&M-EST. PATIENT-LVL I: CPT | Mod: PBBFAC,,,

## 2025-07-28 NOTE — PROGRESS NOTES
Pt came to BP check. Timer was set for 20 minutes for pt to sit--- as directed by PACO. When timer went off and we went to get patient, patient had gotten on elevator and left.       Pt knows he has to sit for 20 minutes for all BP checks to get an accurate reading.

## 2025-08-06 ENCOUNTER — OFFICE VISIT (OUTPATIENT)
Dept: CARDIOLOGY | Facility: CLINIC | Age: 70
End: 2025-08-06
Payer: MEDICARE

## 2025-08-06 VITALS
DIASTOLIC BLOOD PRESSURE: 110 MMHG | OXYGEN SATURATION: 98 % | SYSTOLIC BLOOD PRESSURE: 140 MMHG | WEIGHT: 227.38 LBS | HEART RATE: 66 BPM | BODY MASS INDEX: 30.8 KG/M2 | HEIGHT: 72 IN

## 2025-08-06 DIAGNOSIS — I48.4 ATYPICAL ATRIAL FLUTTER: ICD-10-CM

## 2025-08-06 DIAGNOSIS — I35.0 NONRHEUMATIC AORTIC VALVE STENOSIS: ICD-10-CM

## 2025-08-06 DIAGNOSIS — I48.91 ATRIAL FIBRILLATION, UNSPECIFIED TYPE: ICD-10-CM

## 2025-08-06 DIAGNOSIS — E78.5 HYPERLIPIDEMIA, UNSPECIFIED HYPERLIPIDEMIA TYPE: ICD-10-CM

## 2025-08-06 DIAGNOSIS — K30 INDIGESTION: ICD-10-CM

## 2025-08-06 DIAGNOSIS — Z95.2 HX OF AORTIC VALVE REPLACEMENT: ICD-10-CM

## 2025-08-06 DIAGNOSIS — I10 HYPERTENSION, UNSPECIFIED TYPE: Primary | ICD-10-CM

## 2025-08-06 DIAGNOSIS — Z95.1 HX OF CABG: ICD-10-CM

## 2025-08-06 PROCEDURE — 99999 PR PBB SHADOW E&M-EST. PATIENT-LVL IV: CPT | Mod: PBBFAC,,, | Performed by: NURSE PRACTITIONER

## 2025-08-06 PROCEDURE — 99214 OFFICE O/P EST MOD 30 MIN: CPT | Mod: PBBFAC | Performed by: NURSE PRACTITIONER

## 2025-08-06 PROCEDURE — 99214 OFFICE O/P EST MOD 30 MIN: CPT | Mod: S$PBB,,, | Performed by: NURSE PRACTITIONER

## 2025-08-06 RX ORDER — METOPROLOL SUCCINATE 25 MG/1
25 TABLET, EXTENDED RELEASE ORAL DAILY
COMMUNITY

## 2025-08-06 RX ORDER — AMLODIPINE BESYLATE 5 MG/1
5 TABLET ORAL DAILY
Qty: 90 TABLET | Refills: 3 | Status: SHIPPED | OUTPATIENT
Start: 2025-08-06

## 2025-08-06 NOTE — PROGRESS NOTES
"PCP: Dina Sebastian DO    Referring Provider:   Chief Complaint   Patient presents with    Hyperlipidemia    Hypertension    Follow-up     No cardiac complaints       Subjective:   Shreyas Man is a 70 y.o. male with hx of AS s/p AVR with a bioprosthetic valve, h/o post op a fib in 2010 without recurrence on amiodarone to maintain RSR, CAD s/p CABG, HTN, and HLD,  who presents for 4 week follow up.  History of Present Illness    HPI:  Patient reports chest tightness, described as mild soreness occurring with every drink, present and possibly related to indigestion. He previously took Prilosec OTC, which was believed to be helpful, but discontinued for unknown reasons. He mentions a history of significant acid reflux requiring morphine treatment on two occasions.    He reports changes in bowel habits over the past couple of years, with alternating constipation (no bowel movement for 2-3 days) and diarrhea, particularly when hot or drinking certain beverages while golfing. Initially associated with Coca-Cola, it now occurs with Powerade and water. He consulted with "Dr. Arroyo", who recommended starting Miralax daily.    His last colonoscopy a few months ago was reported as normal, with a polyp removed. He no longer has palpitations following ablation but notes recent BP elevation. He reports lightheadedness when standing, despite drinking fluids.    He continues to play golf regularly, reporting significant sweating during activity, though decreased compared to previously, particularly on his face. He had surgery 5 weeks ago, specific nature not detailed.    He denies current palpitations or chest pain during physical activity such as golfing or walking, and denies dyspnea while golfing or walking.        7/8/2025 presents for HD follow up.  History of Present Illness    HPI:  Patient, a 70-year-old male, underwent a cardiac ablation 3 weeks ago for AF and AFL. A second procedure was necessary due to equipment " failure during the initial attempt.    Post-ablation, he reported significant chest pain and right groin area for 2 weeks, which was severe, particularly when standing up or taking deep breaths.    He reports consistently elevated BP since the procedure, associated with headaches. He notes increased difficulty with physical activities, particularly walking uphill.    He expresses concern about his current medications, especially the continued use of Eliquis. He reports taking Xanax PRN for anxiety symptoms. He discloses impotence since his CABG.    He played 18 holes of golf yesterday in hot weather. He abstained from all activities for 2 weeks post-procedure, then gradually resumed activities like golf, starting with 9 holes.    He denies current chest pain, dyspnea, or palpitations. He denies a history of MI.          5/7/2025 presents for HD follow up from admission 4/30/2025-5/1/2025 for sudden onset a fib with RVR with ventricular rates >150 bpm. Patient was discharged home on Eliquis 5 mg po bid and metoprolol succinate 25 mg po daily. Rhythm strip from 5/1/2025 at 06:41 demonstrated conversion to sinus bradycardia with HR 54 bpm.   Today's EKG demonstrates sinus bradycardia with HR 54 bpm. The patient had been on chronic low dose amiodarone since his post op a fib but had no known episodes since. He was scheduled to have a Loop recorder implanted but this will not be necessary now that we have a confirmed diagnosis of  afib. His amiodarone was stopped on hospital admission.     I d/w the patient today referral to EP to be considered for a fib ablation. He does not fall and has had no issues with bleeding so at present does not seem to be a candidate for a Watchman Implant.   Refer to Dr. Brito in [place], [place].    4/9/2025 presents to discuss referral to EP. The Zio monitor demonstrated 70 runs SVT, longest 13.1 seconds (130 beats per minute.). I recommended that he see an EP and he was to call me back when he  "decided to go or not. In the meantime he was seen by his PCP, who referred him to Dr. Judd at [facility] in [place]. The patient reports that he was not happy with the interaction with Dr. Judd and does not want to see him again. I have reviewed the notes from that OV. Dr. Judd has recommended a Loop implant and the patient would like Dr. Pham to do that here. The patient would like to see Dr. Brito in [place], [place] if needed pending information from the Loop.     2/5/2025 present for follow up to discuss the results of his Lexiscan/echo and reassess symptoms.  Patient had "flutters" after last appointment. He is concerned may have been a fib. He has had no known episodes of a fib since his post op a fib in 2010. He admits that he was anxious at the time and his bp was high due to anxiety. He went to see his DIL, who is a nurse who calmed him and took his HR and BP which was improved. He has had no other episodes.    12/9/2024 presents for routine follow up. Patient reports 3 episodes of chest discomfort in the last 6 months that he describes a "deep soreness" of the left side of his chest. There is no triggering or relieving factors identified and it lasts for no more than one minute. He has had no chest discomfort in the last 2-3 weeks. He states he had a similar discomfort 2 years ago prior to his LHC which demonstrated patent but atrophied PERLA to the OM with no significant disease of the native OM (approx 50% distal Lcx lesion); patent LIMA to the LAD and patent Left radial artery graft the PDA. He also reports occasional palpitations with exertion described as heart racing.  Patient ran out of his thyroid hormone replacement therapy approx one month ago'      His EKG today demonstrated sinus tachycardia with ST changes in the inferior and anterolateral leads. The patient is not having active chest pain. He does not want LHC. I reviewed the EKG and case with Dr. Corea.     6/5/2024 presents for routine " follow up. He is doing well and denies complaints.     12/5/2023  presents for routine follow up. He remains physically active and asymptomatic.     6/5/2023 presents for routine follow up. He remains physically active with out issues.   3/1/2023 presents for routine follow up. He states he is doing will but does have indigestion at times. He has no exertional symptoms.         Fhx:  Family History   Problem Relation Name Age of Onset    Heart attack Mother      Heart attack Father      Heart disease Father       Shx:   Social History     Socioeconomic History    Marital status:    Tobacco Use    Smoking status: Former     Current packs/day: 0.00     Types: Cigarettes     Quit date: 5/31/2010     Years since quitting: 15.1    Smokeless tobacco: Never    Tobacco comments:     quit 10+ years ago   Substance and Sexual Activity    Alcohol use: Never    Drug use: Never     Social Drivers of Health     Financial Resource Strain: Low Risk  (4/30/2025)    Overall Financial Resource Strain (CARDIA)     Difficulty of Paying Living Expenses: Not hard at all   Food Insecurity: No Food Insecurity (4/30/2025)    Hunger Vital Sign     Worried About Running Out of Food in the Last Year: Never true     Ran Out of Food in the Last Year: Never true   Transportation Needs: No Transportation Needs (4/30/2025)    PRAPARE - Transportation     Lack of Transportation (Medical): No     Lack of Transportation (Non-Medical): No   Physical Activity: Sufficiently Active (3/25/2025)    [facility] from [facility]    Exercise Vital Sign     Days of Exercise per Week: 5 days     Minutes of Exercise per Session: 30 min   Stress: No Stress Concern Present (4/30/2025)    [employer] [place] [employer] - Occupational Stress Questionnaire     Feeling of Stress : Not at all   Housing Stability: Low Risk  (4/30/2025)    Housing Stability Vital Sign     Unable to Pay for Housing in the Last Year: No     Homeless  in the Last Year: No       EKG  7/8/2025 RSR with HR 72 bpm; normal EKG  5/7/2025 sinus bradycardia; HR 54 bpm; NSTWA  4/30/2025 a fib with RVR;; ventricular rate of 151 bpm; possible LVH; widespread ST-TWA  2/5/2025 RSR with HR 83 bpm; NS ST&TWA; compared with EKG 12/9/2024 inferior and anterolateral ST changes have resolved.  12/9/2024 sinus tachycardia;  bpm; LVH with ST changes in the inferior and anterolateral leads (reviewed with Dr. Corea)  6/5/2024 RSR with sinus arrhythmia; HR 76 bpm; NS ST&TWA; when compared with EKG 12/5/2023 no significant change was found.  Results for orders placed or performed in visit on 07/08/25   EKG 12-lead    Collection Time: 07/08/25 10:08 AM   Result Value Ref Range    QRS Duration 84 ms    OHS QTC Calculation 438 ms    Narrative    Test Reason : I48.91,    Vent. Rate :  72 BPM     Atrial Rate :  72 BPM     P-R Int : 144 ms          QRS Dur :  84 ms      QT Int : 400 ms       P-R-T Axes :  73  40  33 degrees    QTcB Int : 438 ms    Normal sinus rhythm  Normal ECG  When compared with ECG of 07-May-2025 13:22,  No significant change was found  Confirmed by Tristan Pham ([id number]) on 7/8/2025 4:54:54 PM    Referred By:            Confirmed By: Tristan Pham     Zio monitor worn from 2/5/2025-2/18/2025  Interpretation Summary  Show Result Comparison       Basic rhythm is sinus, average heart rate 65 beats per minute.    Occasional PACs, couplets and triplets.  70 runs SVT, longest 13.1 seconds (130 beats per minute.).    Rare PVCs and couplets.  One 6 beat run VT, max heart rate 203 beats per minute.    Two patient triggered events during NSR (65-96), PACs and PVCs.     Patient had a min HR of 39 bpm, max HR of 203 bpm, and avg HR of 65 bpm. Predominant underlying rhythm was Sinus Rhythm. 1 run of Ventricular Tachycardia occurred lasting 6 beats with a max rate of 203 bpm (avg 196 bpm). 70 Supraventricular Tachycardia runs occurred, the run with the fastest interval  lasting 11.4 secs with a max rate of 174 bpm, the longest lasting 13.1 secs with an avg rate of 130 bpm. Isolated SVEs were occasional (1.2%, 84561), SVE Couplets were rare (<1.0%, 1561), and SVE Triplets were rare (<1.0%, 165). Isolated VEs were rare (<1.0%, 349), VE Couplets were rare (<1.0%, 15), and no VE Triplets were present.         Twibingoiscan 12/23/2024   Impression:     1. Scintigraphically negative for ischemia, findings favor old infarct or artifact but do not definitively rule out balanced ischemia.  Correlate clinically.  Consider invasive ischemic evaluation if e.g. symptoms discordant with nuclear stress findings.  2. the global left ventricular systolic function is normal with an LV ejection fraction of 70 % and no evidence of LV dilatation. Wall motion (hypokinetic septal segments) appears to correlate with the aforementioned segmental perfusion defects, however could also be explained by LBBB, RV pace, any cause of electromechanical dyssychrony        Electronically signed by: Tj Stevenson  Date:                                            12/23/2024  Time:                                           14:35      Results for orders placed during the hospital encounter of 12/23/24    Echo    Interpretation Summary    Left Ventricle: The left ventricle is normal in size. Normal wall thickness. There is normal systolic function with a visually estimated ejection fraction of 60 - 65%. There is normal diastolic function.    Right Ventricle: Normal right ventricular cavity size. Systolic function is normal.    Aortic Valve: There is a bioprosthetic valve in the aortic position.    Mitral Valve: There is mild regurgitation.    Tricuspid Valve: There is mild regurgitation.    Pulmonic Valve: There is mild regurgitation.         Results for orders placed during the hospital encounter of 12/26/23    Echo    Interpretation Summary    Left Ventricle: The left ventricle is normal in size. Increased wall thickness.  Septal thickening. Normal wall motion. There is hyperdynamic systolic function with a visually estimated ejection fraction of 70 - 75%. There is normal diastolic function.    Right Ventricle: Normal right ventricular cavity size. Systolic function is borderline low.    Aortic Valve: There is a porcine prosthetic valve in the aortic position.    Mitral Valve: There is no stenosis.    Tricuspid Valve: There is moderate regurgitation.    Pulmonic Valve: There is mild regurgitation.    IVC/SVC: Normal venous pressure at 3 mmHg.    ECHO Results for orders placed during the hospital encounter of 02/20/23    Echo Saline Bubble? No    Interpretation Summary  · The left ventricle is normal in size with mild concentric hypertrophy and normal systolic function.  · The estimated ejection fraction is 65%.  · Left ventricular diastolic dysfunction.  · Normal right ventricular size.  · There is a bioprosthetic aortic valve present.  · The aortic valve mean gradient is 21 mmHg with a dimensionless index of 0.39.  · Mild mitral regurgitation.  · Mild tricuspid regurgitation.  · Mild pulmonic regurgitation.  · Mild left atrial enlargement.    [facility] Results for orders placed during the hospital encounter of 08/01/22    Cardiac catheterization    Conclusion  · The left ventricular systolic function was normal.  · The left ventricular end diastolic pressure was normal.  · The pre-procedure left ventricular end diastolic pressure was 20.  · The ejection fraction was calculated to be 60%.  · The left ventricular ejection fraction was grossly normal.  · There was no mitral valve regurgitation.  · The Dist Cx lesion was 50% stenosed.  · The Prox LAD lesion was 80% stenosed.  · The estimated blood loss was none.  · There was three vessel coronary artery disease.  · Zenia to OM is atrophiied with no sig disease in the native om.    The procedure log was documented by Documenter: RT Lena and verified by Tristan Pham MD.    Date:  8/1/2022  Time: 12:24 PM    6/9/2025 Dr. Brito  Ablation of a fib by pulmonary isolation  Ablation of typical right atrial isthmus dependent flutter  Ablation of non-pulmonary venous triggers of atrial fibrillation  Ablation of atypical left atrial flutter    Lab Results   Component Value Date     08/06/2025    K 4.7 08/06/2025     08/06/2025    CO2 27 08/06/2025    BUN 10 08/06/2025    CREATININE 0.73 08/06/2025    CALCIUM 10.8 (H) 08/06/2025    ANIONGAP 12 08/06/2025    EGFRNONAA 100 07/28/2022       Lab Results   Component Value Date    CHOL 144 12/09/2024    CHOL 133 06/06/2024    CHOL 178 10/02/2023     Lab Results   Component Value Date    HDL 44 12/09/2024    HDL 54 06/06/2024    HDL 57 10/02/2023     Lab Results   Component Value Date    LDLCALC 76 12/09/2024    LDLCALC 64 06/06/2024    LDLCALC 95 10/02/2023     Lab Results   Component Value Date    TRIG 122 12/09/2024    TRIG 77 06/06/2024    TRIG 130 10/02/2023     Lab Results   Component Value Date    CHOLHDL 3.3 12/09/2024    CHOLHDL 2.5 06/06/2024    CHOLHDL 3.1 10/02/2023       Lab Results   Component Value Date    WBC 9.22 05/01/2025    HGB 16.2 05/01/2025    HCT 47.4 05/01/2025    MCV 92.9 05/01/2025     05/01/2025           Current Outpatient Medications:     metoprolol succinate (TOPROL-XL) 25 MG 24 hr tablet, Take 25 mg by mouth once daily., Disp: , Rfl:     ALPRAZolam (XANAX) 0.25 MG tablet, Take 1 tablet (0.25 mg total) by mouth daily as needed for Anxiety., Disp: 90 tablet, Rfl: 3    amLODIPine (NORVASC) 5 MG tablet, Take 1 tablet (5 mg total) by mouth once daily., Disp: 90 tablet, Rfl: 3    apixaban (ELIQUIS) 5 mg Tab, Take 1 tablet (5 mg total) by mouth 2 (two) times daily., Disp: 180 tablet, Rfl: 3    aspirin (ECOTRIN) 81 MG EC tablet, Take 1 tablet (81 mg total) by mouth once daily., Disp: 90 tablet, Rfl: 3    atorvastatin (LIPITOR) 80 MG tablet, Take 1 tablet (80 mg total) by mouth once daily., Disp: 90 tablet, Rfl: 3     HYDROcodone-acetaminophen (NORCO)  mg per tablet, Take 1 tablet by mouth every 6 (six) hours as needed for Pain., Disp: 120 tablet, Rfl: 0    levothyroxine (SYNTHROID) 75 MCG tablet, Take 1 tablet (75 mcg total) by mouth before breakfast., Disp: 90 tablet, Rfl: 3    lisinopriL (PRINIVIL,ZESTRIL) 40 MG tablet, Take 1 tablet (40 mg total) by mouth once daily., Disp: 90 tablet, Rfl: 3    tadalafiL (CIALIS) 20 MG Tab, Take 1 tablet (20 mg total) by mouth every other day., Disp: 10 tablet, Rfl: 11  Meds reviewed but not reconciled. He does not bring his meds in for reconciliation.      Review of Systems   Respiratory:  Negative for shortness of breath.    Cardiovascular:  Negative for chest pain, palpitations, orthopnea, claudication, leg swelling and PND.   Gastrointestinal:  Positive for heartburn.   Neurological:  Negative for dizziness, loss of consciousness and weakness.           Objective:   BP (!) 140/110 (BP Location: Left arm, Patient Position: Sitting)   Pulse 66   Ht 6' (1.829 m)   Wt 103.1 kg (227 lb 6.4 oz)   SpO2 98%   BMI 30.84 kg/m²     Physical Exam  Vitals and nursing note reviewed.   Constitutional:       Appearance: Normal appearance. He is obese.   HENT:      Head: Atraumatic.   Neck:      Vascular: No carotid bruit.   Cardiovascular:      Rate and Rhythm: Normal rate and regular rhythm.      Pulses: Normal pulses.      Heart sounds: Normal heart sounds.   Pulmonary:      Effort: Pulmonary effort is normal.      Breath sounds: Normal breath sounds.   Abdominal:      Palpations: Abdomen is soft.   Musculoskeletal:      Cervical back: Neck supple.      Right lower leg: No edema.      Left lower leg: No edema.   Skin:     General: Skin is warm and dry.      Capillary Refill: Capillary refill takes less than 2 seconds.   Neurological:      Mental Status: He is alert.           Assessment:     1. Hypertension, unspecified type  amLODIPine (NORVASC) 5 MG tablet      2. Nonrheumatic aortic  "valve stenosis        3. Hyperlipidemia, unspecified hyperlipidemia type        4. Atypical atrial flutter        5. Atrial fibrillation, unspecified type        6. Hx of CABG        7. Indigestion              Plan:   Nonrheumatic aortic valve stenosis  AVR 8/5/2010 Dr. Rice  [facility] Epic 25 mm   Serial number [id number]          Hypertension  140/110 mmHg  Rechecked 140/102 mmHg    Patient came for the 2 week bp check but left prior to having his bp checked.    Restart amlodipine 5 mg po daily   Recheck bp in 2 weeks    Hyperlipidemia  Lab Results   Component Value Date    CHOL 144 12/09/2024    CHOL 133 06/06/2024    CHOL 178 10/02/2023      Lab Results   Component Value Date    HDL 44 12/09/2024    HDL 54 06/06/2024    HDL 57 10/02/2023     Lab Results   Component Value Date    LDLCALC 76 12/09/2024    LDLCALC 64 06/06/2024    LDLCALC 95 10/02/2023      Lab Results   Component Value Date    TRIG 122 12/09/2024    TRIG 77 06/06/2024    TRIG 130 10/02/2023     No results found for: "TOTALCHOLEST"  Lab Results   Component Value Date    NONHDLCHOL 100 12/09/2024    NONHDLCHOL 79 06/06/2024    NONHDLCHOL 121 10/02/2023     Lab Results   Component Value Date    CHOLHDL 3.3 12/09/2024    CHOLHDL 2.5 06/06/2024    CHOLHDL 3.1 10/02/2023     Lipitor 80 mg po daily  Lipids followed by PCP    Atypical atrial flutter  6/9/2025- Dr. Brito  Ablation of typical right atrial isthmus dependent flutter  Ablation of atypical left atrial flutter       Atrial fibrillation  6/9/2025 Dr. Brito  Ablation of a fib by pulmonary venous isolation  Ablation of non-pulmonary venous triggers of a fib     Amiodarone was discontinued.  Eliquis has been continued.  Started on metoprolol succinate 25 mg po daily- continue            Hx of CABG  S/p CABG 8/5/2010- Dr. Krystal AGARWAL to LAD  And STEFANO to PERLA Y graft with distal anastomosis to the diag  Left radial artery to the PDA     Lexiscan 12/23/2024   Impression:     1. Scintigraphically " negative for ischemia, findings favor old infarct or artifact but do not definitively rule out balanced ischemia.  Correlate clinically.  Consider invasive ischemic evaluation if e.g. symptoms discordant with nuclear stress findings.  2. the global left ventricular systolic function is normal with an LV ejection fraction of 70 % and no evidence of LV dilatation. Wall motion (hypokinetic septal segments) appears to correlate with the aforementioned segmental perfusion defects, however could also be explained by LBBB, RV pace, any cause of electromechanical dyssychrony        Electronically signed by: Tj Stevenson  Date:                                            12/23/2024  Time:                                           14:35     No chest pain or SOB  Continue ASA/Lipitor       Indigestion  Restart Prilosec OTC  D/w PCP referral for EGD    Hx of aortic valve replacement  8/5/2010 Dr. Rice  [facility] Epic 25 mm   Serial number [id number]                  RTC:6 months  Bp check in 2 weeks with BMP            This note was generated with the assistance of ambient listening technology. Verbal consent was obtained by the patient and accompanying visitor(s) for the recording of patient appointment to facilitate this note. I attest to having reviewed and edited the generated note for accuracy, though some syntax or spelling errors may persist. Please contact the author of this note for any clarification.

## 2025-08-06 NOTE — ASSESSMENT & PLAN NOTE
140/110 mmHg  Rechecked 140/102 mmHg    Patient came for the 2 week bp check but left prior to having his bp checked.    Restart amlodipine 5 mg po daily   Recheck bp in 2 weeks

## 2025-08-06 NOTE — ASSESSMENT & PLAN NOTE
6/9/2025 Dr. Brito  Ablation of a fib by pulmonary venous isolation  Ablation of non-pulmonary venous triggers of a fib     Amiodarone was discontinued.  Eliquis has been continued.  Started on metoprolol succinate 25 mg po daily- continue

## 2025-08-18 PROCEDURE — 88305 TISSUE EXAM BY PATHOLOGIST: CPT | Mod: TC,SUR

## 2025-08-18 PROCEDURE — 88305 TISSUE EXAM BY PATHOLOGIST: CPT | Mod: 26,,, | Performed by: PATHOLOGY

## 2025-08-19 ENCOUNTER — LAB REQUISITION (OUTPATIENT)
Dept: LAB | Facility: HOSPITAL | Age: 70
End: 2025-08-19
Payer: MEDICARE

## 2025-08-19 DIAGNOSIS — D49.2 NEOPLASM OF UNSPECIFIED BEHAVIOR OF BONE, SOFT TISSUE, AND SKIN: ICD-10-CM

## 2025-08-20 LAB
ESTROGEN SERPL-MCNC: NORMAL PG/ML
INSULIN SERPL-ACNC: NORMAL U[IU]/ML
LAB AP GROSS DESCRIPTION: NORMAL
LAB AP LABORATORY NOTES: NORMAL
LAB AP SPEC A DDX: NORMAL
LAB AP SPEC A MORPHOLOGY: NORMAL
LAB AP SPEC A PROCEDURE: NORMAL
T3RU NFR SERPL: NORMAL %

## (undated) DEVICE — CONTRAST ISOVUE 370 100ML

## (undated) DEVICE — CATH DIAG IMPULSE 6FR FL5

## (undated) DEVICE — CHLORAPREP 10.5 ML APPLICATOR

## (undated) DEVICE — SYR MEDRAD 50/BX

## (undated) DEVICE — CLIPPER BLADE SURGICAL

## (undated) DEVICE — SET IV PRIMARY

## (undated) DEVICE — INTRODUCER CATH 6F 11CM

## (undated) DEVICE — GLOVE PROTEXIS PI CRM 5.5

## (undated) DEVICE — CATH IMPULSE 6FR MULTI-PAK

## (undated) DEVICE — DRESSING TRANS 4X4 TEGADERM

## (undated) DEVICE — Device

## (undated) DEVICE — GLOVE SURG BIOGEL LATEX SZ 7.5

## (undated) DEVICE — SENSOR OXIMAX NELCOR ADLT 36IN

## (undated) DEVICE — NDL PERCUTANEOUS ENTRYBSDN 18